# Patient Record
Sex: FEMALE | Race: WHITE | NOT HISPANIC OR LATINO | Employment: UNEMPLOYED | URBAN - NONMETROPOLITAN AREA
[De-identification: names, ages, dates, MRNs, and addresses within clinical notes are randomized per-mention and may not be internally consistent; named-entity substitution may affect disease eponyms.]

---

## 2017-05-04 ENCOUNTER — OFFICE VISIT (OUTPATIENT)
Dept: OBGYN | Facility: OTHER | Age: 37
End: 2017-05-04
Attending: NURSE PRACTITIONER
Payer: COMMERCIAL

## 2017-05-04 VITALS
HEIGHT: 64 IN | DIASTOLIC BLOOD PRESSURE: 72 MMHG | SYSTOLIC BLOOD PRESSURE: 112 MMHG | RESPIRATION RATE: 16 BRPM | OXYGEN SATURATION: 97 % | BODY MASS INDEX: 23.22 KG/M2 | WEIGHT: 136 LBS | HEART RATE: 92 BPM

## 2017-05-04 DIAGNOSIS — Z11.3 SCREEN FOR STD (SEXUALLY TRANSMITTED DISEASE): Primary | ICD-10-CM

## 2017-05-04 DIAGNOSIS — Z12.4 SCREENING FOR MALIGNANT NEOPLASM OF CERVIX: ICD-10-CM

## 2017-05-04 DIAGNOSIS — Z98.890 H/O NONCHEMICAL TUBAL OCCLUSION: ICD-10-CM

## 2017-05-04 PROCEDURE — 99212 OFFICE O/P EST SF 10 MIN: CPT

## 2017-05-04 PROCEDURE — G0123 SCREEN CERV/VAG THIN LAYER: HCPCS | Mod: ZL | Performed by: NURSE PRACTITIONER

## 2017-05-04 PROCEDURE — 99213 OFFICE O/P EST LOW 20 MIN: CPT | Performed by: NURSE PRACTITIONER

## 2017-05-04 PROCEDURE — 87491 CHLMYD TRACH DNA AMP PROBE: CPT | Mod: ZL | Performed by: NURSE PRACTITIONER

## 2017-05-04 PROCEDURE — 99000 SPECIMEN HANDLING OFFICE-LAB: CPT | Mod: ZL | Performed by: NURSE PRACTITIONER

## 2017-05-04 PROCEDURE — 88142 CYTOPATH C/V THIN LAYER: CPT | Performed by: NURSE PRACTITIONER

## 2017-05-04 PROCEDURE — 87624 HPV HI-RISK TYP POOLED RSLT: CPT | Mod: ZL | Performed by: NURSE PRACTITIONER

## 2017-05-04 PROCEDURE — G0476 HPV COMBO ASSAY CA SCREEN: HCPCS | Mod: ZL | Performed by: NURSE PRACTITIONER

## 2017-05-04 PROCEDURE — 87591 N.GONORRHOEAE DNA AMP PROB: CPT | Mod: ZL | Performed by: NURSE PRACTITIONER

## 2017-05-04 ASSESSMENT — PAIN SCALES - GENERAL: PAINLEVEL: NO PAIN (0)

## 2017-05-04 NOTE — PROGRESS NOTES
CC:  Annual gyn exam and referral request  HPI:  Brissa Santana is a 36 year old female [id number]'s last menstrual period was 2017 (approximate).  Periods are regular. H/O tubal occlusion 2008.  She is requesting a referral for possible reversal of her tubal occlusion.  No other c/o.      Past GYN history:  No STD history  STI testing offered?  Accepted       Last PAP smear:  Normal  Mammograms up to date: not applicable      Past Medical History:   Diagnosis Date     Backache, unspecified 2007     Chemical dependency (H) 2016     FRANCIA (generalized anxiety disorder)      Heroin use 2013     Lumbago 2003     MDD (major depressive disorder), recurrent, severe, with psychosis (H)      Polypharmacy -h/o tramadol,ambien,narcotic depend. 2013     Tramadol dependency and abuse 2011       Past Surgical History:   Procedure Laterality Date     APPENDECTOMY       BACK SURGERY      lumbar diskectomy with fusion      SECTION      x2     D & C       OTHER SURGICAL HISTORY      Breast Reduction     TONSILLECTOMY       TUBAL LIGATION         Family History   Problem Relation Age of Onset     Breast Cancer Maternal Grandmother      CANCER Paternal Grandmother      Hepatitis Paternal Uncle      Hep C     Hepatitis Father      Hep C       Current Outpatient Prescriptions   Medication Sig Dispense Refill     eszopiclone (LUNESTA) 2 MG tablet Take 1 tablet (2 mg) by mouth nightly as needed for sleep 30 tablet 1     venlafaxine (EFFEXOR-XR) 150 MG 24 hr capsule Take 1 capsule (150 mg) by mouth daily 30 capsule 3     clindamycin (CLEOCIN) 100 MG vaginal suppository Place 1 suppository (100 mg) vaginally At Bedtime 3 suppository 0       Allergies: Latex    ROS:  C: NEGATIVE for fever, chills, change in weight  I: NEGATIVE for worrisome rashes, moles or lesions  : NEGATIVE for frequency, dysuria, hematuria, vaginal discharge, or irregular bleeding  E: NEGATIVE for temperature  "intolerance, skin/hair changes  P: NEGATIVE for changes in mood or affect    EXAM:  Blood pressure 112/72, pulse 92, resp. rate 16, height 5' 3.75\" (1.619 m), weight 136 lb (61.7 kg), last menstrual period 04/01/2017, SpO2 97 %, not currently breastfeeding.   BMI= Body mass index is 23.53 kg/(m^2).  General - pleasant female in no acute distress.  Breast - no nodularity, asymmetry or nipple discharge bilaterally.  Abdomen - soft, nontender, nondistended, no hepatosplenomegaly.  Pelvic - EG: normal adult female, BUS: within normal limits, Vagina: well rugated, no discharge, Cervix: no lesions or CMT, Uterus: firm, normal sized and nontender, Adnexae: no masses or tenderness.  Rectovaginal - deferred.  Musculoskeletal - no gross deformities.  Neurological - normal strength, sensation, and mental status.      ASSESSMENT/PLAN:  (Z11.3) Screen for STD (sexually transmitted disease)  (primary encounter diagnosis)  Comment:   Plan: Chlamydia trachomatis PCR, Neisseria         gonorrhoeae PCR            (Z12.4) Screening for malignant neoplasm of cervix  Comment:   Plan: A pap thin layer screen with  HPV - recommended        age 30 - 65 years (select HPV order below), HPV        High Risk Types DNA Cervical            (N97.1) H/O nonchemical tubal occlusion  Comment:   Plan: OB/GYN REFERRAL        Release of records completed.       Discussed exercise and healthy eating, including calcium intake.  She should return to the clinic in one year, or sooner if problems arise.    "

## 2017-05-04 NOTE — PATIENT INSTRUCTIONS
The Range of Pap Test Results  When your Pap test is sent to the lab, the lab studies your cell samples and reports any abnormal cell changes. Your health care provider can discuss these changes with you. In some cases, an abnormal Pap test is due to an infection. More serious cell changes range from dysplasia to cancer. Talk to your health care provider about your Pap test.    Normal results  Cervical cells, even normal ones, are always changing. As they mature, normal squamous cells move from deeper layers within the cervix. Over time, these cells flatten and cover the surface of the cervix. Within the cervical canal, the cells are different. These glandular cells are taller and not as flat as the cells on the surface of the cervix. When a Pap test sample shows healthy cells of both types, the results are negative. Keep having Pap tests as often as directed.  Abnormal results  A positive Pap test result means some cells in the sample showed abnormal changes. These results are grouped by the type of cell change and the location, or extent, of the changes. Depending on the results, you may need further testing.    Inflammation: Noncancerous changes are present. They may be due to normal cell repair. Or, they may be caused by an infection, such as HPV or yeast. Further testing may be needed. (Also called reactive cellular changes.)    Atypical squamous cells: Test results are unclear. Cells on the surface of the cervix show changes, but their significance is not yet known. Testing for HPV and other sexually transmitted infections (STIs) may be needed. Treatment may be required. (Reported as ASC-US or ASC-H.)    Atypical glandular cells: Cells lining the cervical canal show abnormal changes. Further testing is likely. You may also have treatment to destroy or remove problem cells. (Reported as AGC.)    Mild dysplasia: Cells show distinct changes. More testing or HPV typing may be done. You may also have treatment to  destroy or remove problem cells. (Reported as low-grade BRANDY or DONNY 1.)    Moderate to severe dysplasia: Cells show precancerous changes. Or, noninvasive cancer (carcinoma in situ) may be present. Treatment to destroy or remove problem cells is likely. (Reported as high-grade BRANDY or DONNY 2 or DONNY 3.)    Cancer: Different types of cancer may be detected by your Pap test. More tests to assess the cancer's extent are likely. The type of treatment will depend on the test results and other factors, such as age and health history. (Reported as squamous cell carcinoma, endocervical adenocarcinoma in situ, or adenocarcinoma.)    1415-4818 [employer]. [address] [place] [address], [place] [address]. All rights reserved. This information is not intended as a substitute for professional medical care. Always follow your healthcare professional's instructions.

## 2017-05-04 NOTE — MR AVS SNAPSHOT
After Visit Summary   5/4/2017    Brissa Santana    MRN: [id number]           Patient Information     Date Of Birth          1980        Visit Information        Provider Department      5/4/2017 9:30 AM Starr Ochoa NP [facility]        Today's Diagnoses     Screen for STD (sexually transmitted disease)    -  1    Screening for malignant neoplasm of cervix        H/O nonchemical tubal occlusion          Care Instructions      The Range of Pap Test Results  When your Pap test is sent to the lab, the lab studies your cell samples and reports any abnormal cell changes. Your health care provider can discuss these changes with you. In some cases, an abnormal Pap test is due to an infection. More serious cell changes range from dysplasia to cancer. Talk to your health care provider about your Pap test.    Normal results  Cervical cells, even normal ones, are always changing. As they mature, normal squamous cells move from deeper layers within the cervix. Over time, these cells flatten and cover the surface of the cervix. Within the cervical canal, the cells are different. These glandular cells are taller and not as flat as the cells on the surface of the cervix. When a Pap test sample shows healthy cells of both types, the results are negative. Keep having Pap tests as often as directed.  Abnormal results  A positive Pap test result means some cells in the sample showed abnormal changes. These results are grouped by the type of cell change and the location, or extent, of the changes. Depending on the results, you may need further testing.    Inflammation: Noncancerous changes are present. They may be due to normal cell repair. Or, they may be caused by an infection, such as HPV or yeast. Further testing may be needed. (Also called reactive cellular changes.)    Atypical squamous cells: Test results are unclear. Cells on the surface of the cervix show changes, but their significance is  not yet known. Testing for HPV and other sexually transmitted infections (STIs) may be needed. Treatment may be required. (Reported as ASC-US or ASC-H.)    Atypical glandular cells: Cells lining the cervical canal show abnormal changes. Further testing is likely. You may also have treatment to destroy or remove problem cells. (Reported as AGC.)    Mild dysplasia: Cells show distinct changes. More testing or HPV typing may be done. You may also have treatment to destroy or remove problem cells. (Reported as low-grade BRANDY or DONNY 1.)    Moderate to severe dysplasia: Cells show precancerous changes. Or, noninvasive cancer (carcinoma in situ) may be present. Treatment to destroy or remove problem cells is likely. (Reported as high-grade BRANDY or DONNY 2 or DONNY 3.)    Cancer: Different types of cancer may be detected by your Pap test. More tests to assess the cancer's extent are likely. The type of treatment will depend on the test results and other factors, such as age and health history. (Reported as squamous cell carcinoma, endocervical adenocarcinoma in situ, or adenocarcinoma.)    9149-5589 [employer]. [address] [place] [address]. All rights reserved. This information is not intended as a substitute for professional medical care. Always follow your healthcare professional's instructions.              Follow-ups after your visit        Additional Services     OB/GYN REFERRAL       Your provider has referred you to:  [facility] reproductive medicine     Please be aware that coverage of these services is subject to the terms and limitations of your health insurance plan.  Call member services at your health plan with any benefit or coverage questions.      Please bring the following to your appointment:  >>   Any x-rays, CTs or MRIs which have been performed.  Contact the facility where they were done to arrange for  prior to your scheduled appointment.  Any new CT, MRI or other  "procedures ordered by your specialist must be performed at a [facility] facility or coordinated by your clinic's referral office.    >>   List of current medications   >>   This referral request   >>   Any documents/labs given to you for this referral                  Follow-up notes from your care team     Return in about 1 year (around 5/4/2018).      Who to contact     If you have questions or need follow up information about today's clinic visit or your schedule please contact [facility] directly at [phone].  Normal or non-critical lab and imaging results will be communicated to you by [employer], letter or phone within 4 business days after the clinic has received the results. If you do not hear from us within 7 days, please contact the clinic through [employer] or phone. If you have a critical or abnormal lab result, we will notify you by phone as soon as possible.  Submit refill requests through [employer] or call your pharmacy and they will forward the refill request to us. Please allow 3 business days for your refill to be completed.          Additional Information About Your Visit        [employer] Information     [employer] gives you secure access to your electronic health record. If you see a primary care provider, you can also send messages to your care team and make appointments. If you have questions, please call your primary care clinic.  If you do not have a primary care provider, please call [phone] and they will assist you.        Care EveryWhere ID     This is your Care EveryWhere ID. This could be used by other organizations to access your [facility] medical records  [id number]        Your Vitals Were     Pulse Respirations Height Last Period Pulse Oximetry Breastfeeding?    92 16 5' 3.75\" (1.619 m) 04/01/2017 (Approximate) 97% No    BMI (Body Mass Index)                   23.53 kg/m2            Blood Pressure from Last 3 Encounters:   05/04/17 112/72   12/13/16 118/70   08/09/16 118/70 "    Weight from Last 3 Encounters:   05/04/17 136 lb (61.7 kg)   12/13/16 146 lb (66.2 kg)   08/09/16 146 lb (66.2 kg)              We Performed the Following     A pap thin layer screen with  HPV - recommended age 30 - 65 years (select HPV order below)     Chlamydia trachomatis PCR     HPV High Risk Types DNA Cervical     Neisseria gonorrhoeae PCR     OB/GYN REFERRAL          Today's Medication Changes          These changes are accurate as of: 5/4/17  1:49 PM.  If you have any questions, ask your nurse or doctor.               These medicines have changed or have updated prescriptions.        Dose/Directions    venlafaxine 150 MG 24 hr capsule   Commonly known as:  EFFEXOR-XR   This may have changed:  Another medication with the same name was removed. Continue taking this medication, and follow the directions you see here.   Used for:  MDD (major depressive disorder), recurrent, severe, with psychosis (H), FRANCIA (generalized anxiety disorder)   Changed by:  Abhilash Philip MD        Dose:  150 mg   Take 1 capsule (150 mg) by mouth daily   Quantity:  30 capsule   Refills:  3                Primary Care Provider Office Phone # Fax #    Abhilash Philip [phone]       [address] [address]        Thank you!     Thank you for choosing [facility]  for your care. Our goal is always to provide you with excellent care. Hearing back from our patients is one way we can continue to improve our services. Please take a few minutes to complete the written survey that you may receive in the mail after your visit with us. Thank you!             Your Updated Medication List - Protect others around you: Learn how to safely use, store and throw away your medicines at www.disposemymeds.org.          This list is accurate as of: 5/4/17  1:49 PM.  Always use your most recent med list.                   Brand Name Dispense Instructions for use    clindamycin 100 MG  vaginal suppository    CLEOCIN    3 suppository    Place 1 suppository (100 mg) vaginally At Bedtime       eszopiclone 2 MG tablet    LUNESTA    30 tablet    Take 1 tablet (2 mg) by mouth nightly as needed for sleep       venlafaxine 150 MG 24 hr capsule    EFFEXOR-XR    30 capsule    Take 1 capsule (150 mg) by mouth daily

## 2017-05-04 NOTE — NURSING NOTE
"Chief Complaint   Patient presents with     Referral     Would like a referral for a tubal reversal. Would like to discuss risks to have this done and what type of tubal she had in the past. Patient unsure where she could go for this.        Initial /72  Pulse 92  Resp 16  Ht 5' 3.75\" (1.619 m)  Wt 136 lb (61.7 kg)  LMP 04/01/2017 (Approximate)  SpO2 97%  Breastfeeding? No  BMI 23.53 kg/m2 Estimated body mass index is 23.53 kg/(m^2) as calculated from the following:    Height as of this encounter: 5' 3.75\" (1.619 m).    Weight as of this encounter: 136 lb (61.7 kg).  Medication Reconciliation: complete   Miracle Caruso      "

## 2017-05-05 LAB
C TRACH DNA SPEC QL NAA+PROBE: NORMAL
N GONORRHOEA DNA SPEC QL NAA+PROBE: NORMAL
SPECIMEN SOURCE: NORMAL
SPECIMEN SOURCE: NORMAL

## 2017-05-11 LAB
COPATH REPORT: ABNORMAL
PAP: ABNORMAL

## 2017-05-12 LAB
FINAL DIAGNOSIS: ABNORMAL
HPV HR 12 DNA CVX QL NAA+PROBE: POSITIVE
HPV16 DNA SPEC QL NAA+PROBE: NEGATIVE
HPV18 DNA SPEC QL NAA+PROBE: NEGATIVE
SPECIMEN DESCRIPTION: ABNORMAL

## 2017-06-22 ENCOUNTER — OFFICE VISIT (OUTPATIENT)
Dept: OBGYN | Facility: OTHER | Age: 37
End: 2017-06-22
Attending: OBSTETRICS & GYNECOLOGY
Payer: COMMERCIAL

## 2017-06-22 VITALS
HEART RATE: 72 BPM | BODY MASS INDEX: 23.92 KG/M2 | DIASTOLIC BLOOD PRESSURE: 60 MMHG | HEIGHT: 63 IN | WEIGHT: 135 LBS | SYSTOLIC BLOOD PRESSURE: 112 MMHG

## 2017-06-22 DIAGNOSIS — N76.0 BV (BACTERIAL VAGINOSIS): Primary | ICD-10-CM

## 2017-06-22 DIAGNOSIS — R87.612 PAPANICOLAOU SMEAR OF CERVIX WITH LOW GRADE SQUAMOUS INTRAEPITHELIAL LESION (LGSIL): ICD-10-CM

## 2017-06-22 DIAGNOSIS — B96.89 BV (BACTERIAL VAGINOSIS): Primary | ICD-10-CM

## 2017-06-22 PROCEDURE — 99213 OFFICE O/P EST LOW 20 MIN: CPT | Performed by: OBSTETRICS & GYNECOLOGY

## 2017-06-22 PROCEDURE — 99213 OFFICE O/P EST LOW 20 MIN: CPT

## 2017-06-22 RX ORDER — METRONIDAZOLE 500 MG/1
500 TABLET ORAL 2 TIMES DAILY
Qty: 14 TABLET | Refills: 10 | Status: SHIPPED | OUTPATIENT
Start: 2017-06-22 | End: 2018-01-03

## 2017-06-22 NOTE — NURSING NOTE
"Chief Complaint   Patient presents with     Colposcopy       Initial /60  Pulse 72  Ht 5' 3\" (1.6 m)  Wt 135 lb (61.2 kg)  BMI 23.91 kg/m2 Estimated body mass index is 23.91 kg/(m^2) as calculated from the following:    Height as of this encounter: 5' 3\" (1.6 m).    Weight as of this encounter: 135 lb (61.2 kg).  Medication Reconciliation: complete     TALON COON      "

## 2017-06-22 NOTE — MR AVS SNAPSHOT
After Visit Summary   6/22/2017    Brissa Santana    MRN: [id number]           Patient Information     Date Of Birth          1980        Visit Information        Provider Department      6/22/2017 3:00 PM Antelmo Jeong MD [facility]        Today's Diagnoses     BV (bacterial vaginosis)    -  1    Papanicolaou smear of cervix with low grade squamous intraepithelial lesion (LGSIL)          Care Instructions    F/u appt  scheduled          Follow-ups after your visit        Your next 10 appointments already scheduled     Jul 20, 2017  2:00 PM CDT   (Arrive by 1:45 PM)   Colposcopy with Antelmo Jeong MD   [facility] )    [address]  [facility] [address]   [phone]              Who to contact     If you have questions or need follow up information about today's clinic visit or your schedule please contact [facility] directly at [phone].  Normal or non-critical lab and imaging results will be communicated to you by [employer]hart, letter or phone within 4 business days after the clinic has received the results. If you do not hear from us within 7 days, please contact the clinic through [employer]hart or phone. If you have a critical or abnormal lab result, we will notify you by phone as soon as possible.  Submit refill requests through [employer] or call your pharmacy and they will forward the refill request to us. Please allow 3 business days for your refill to be completed.          Additional Information About Your Visit        MyChart Information     [employer] gives you secure access to your electronic health record. If you see a primary care provider, you can also send messages to your care team and make appointments. If you have questions, please call your primary care clinic.  If you do not have a primary care provider, please call [phone] and they will assist you.        Care EveryWhere ID     This is your Care  "EveryWhere ID. This could be used by other organizations to access your [facility] medical records  [id number]        Your Vitals Were     Pulse Height BMI (Body Mass Index)             72 5' 3\" (1.6 m) 23.91 kg/m2          Blood Pressure from Last 3 Encounters:   06/22/17 112/60   05/04/17 112/72   12/13/16 118/70    Weight from Last 3 Encounters:   06/22/17 135 lb (61.2 kg)   05/04/17 136 lb (61.7 kg)   12/13/16 146 lb (66.2 kg)              Today, you had the following     No orders found for display         Today's Medication Changes          These changes are accurate as of: 6/22/17  5:24 PM.  If you have any questions, ask your nurse or doctor.               Start taking these medicines.        Dose/Directions    metroNIDAZOLE 500 MG tablet   Commonly known as:  FLAGYL   Used for:  BV (bacterial vaginosis)   Started by:  Antelmo Jeong MD        Dose:  500 mg   Take 1 tablet (500 mg) by mouth 2 times daily for 7 days   Quantity:  14 tablet   Refills:  10            Where to get your medicines      [facility] - [place], [place] - [address] [facility] [address]  [address], [facility] [address]     Phone:  [phone]     metroNIDAZOLE 500 MG tablet                Primary Care Provider Office Phone # Fax #    Abhilash STEPHENIE Philip [phone]       [facility] [address]  [place] [address]        Equal Access to Services     [facility] AH: Hadii aad ku hadasho Soomaali, waaxda luqadaha, qaybta kaalmada adeegyada, waxay melaniin hayrdn helga briceño'jailyn castaneda. So [facility] [phone].    ATENCIÓN: Si habla español, tiene a anderson disposición servicios gratuitos de asistencia lingüística. Llame al [phone].    We comply with applicable federal civil rights laws and [place] laws. We do not discriminate on the basis of race, color, national origin, age, disability sex, sexual orientation or gender identity.            Thank you!     " Thank you for choosing [facility]  for your care. Our goal is always to provide you with excellent care. Hearing back from our patients is one way we can continue to improve our services. Please take a few minutes to complete the written survey that you may receive in the mail after your visit with us. Thank you!             Your Updated Medication List - Protect others around you: Learn how to safely use, store and throw away your medicines at www.disposemymeds.org.          This list is accurate as of: 6/22/17  5:24 PM.  Always use your most recent med list.                   Brand Name Dispense Instructions for use Diagnosis    metroNIDAZOLE 500 MG tablet    FLAGYL    14 tablet    Take 1 tablet (500 mg) by mouth 2 times daily for 7 days    BV (bacterial vaginosis)       venlafaxine 150 MG 24 hr capsule    EFFEXOR-XR    30 capsule    Take 1 capsule (150 mg) by mouth daily    MDD (major depressive disorder), recurrent, severe, with psychosis (H), FRANCIA (generalized anxiety disorder)

## 2017-06-22 NOTE — PROGRESS NOTES
S:  Recurrent BV and abnormal pap.  35 yo f with h/o recurrent BV with coitus documented on wet preps.   Happens with ejaculation only.   She currently has recurrent sx.  She also recently had anuual with pap which returned LSIL with + HR HPV(other)          Patient Active Problem List   Diagnosis     Tramadol dependency and abuse     Drug overdose     ACP (advance care planning)     MDD (major depressive disorder), recurrent, severe, with psychosis (H)     FRANCIA (generalized anxiety disorder)     Chemical dependency (H)     Neck pain     History of reduction mammoplasty     History of spinal surgery     Insomnia     Inflammation of sacroiliac joint (H)            Past Medical History:   Diagnosis Date     Backache, unspecified 2007     Chemical dependency (H) 2016     FRANCIA (generalized anxiety disorder)      Heroin use 2013     Lumbago 2003     MDD (major depressive disorder), recurrent, severe, with psychosis (H)      Polypharmacy -h/o tramadol,ambien,narcotic depend. 2013     Tramadol dependency and abuse 2011            Past Surgical History:   Procedure Laterality Date     APPENDECTOMY       BACK SURGERY      lumbar diskectomy with fusion      SECTION      x2     D & C       OTHER SURGICAL HISTORY      Breast Reduction     TONSILLECTOMY       TUBAL LIGATION              Social History   Substance Use Topics     Smoking status: Former Smoker     Packs/day: 1.00     Years: 15.00     Types: Cigarettes     Quit date: 3/11/2017     Smokeless tobacco: Never Used      Comment: Tried to quit (yes); longest period tobacco-free- 9 months     Alcohol use No            Family History   Problem Relation Age of Onset     Breast Cancer Maternal Grandmother      CANCER Paternal Grandmother      Hepatitis Paternal Uncle      Hep C     Hepatitis Father      Hep C               Allergies   Allergen Reactions     Latex      Irritation; see comments            Current Outpatient  "Prescriptions   Medication Sig Dispense Refill     metroNIDAZOLE (FLAGYL) 500 MG tablet Take 1 tablet (500 mg) by mouth 2 times daily for 7 days 14 tablet 10     venlafaxine (EFFEXOR-XR) 150 MG 24 hr capsule Take 1 capsule (150 mg) by mouth daily 30 capsule 3          Review Of Systems  Constitutional:  Denies fever  GI/ negative except as noted per hpi    O:   /60  Pulse 72  Ht 5' 3\" (1.6 m)  Wt 135 lb (61.2 kg)  BMI 23.91 kg/m2  Gen:  NAD, A and O         A: Recurrent BV, LSIL pap with + High risk HPV.    P:  Flagyl 500mg po BID for 7 days for current infection. Will defer colposcopy until after treatment.  For recurrence prevention discussed probiotics, withdrawal, condoms and prophylactic flagyl 1 pill with coitus.  Metronidazole refills authorized and I will see her back for f/u appt for Colpo.   Handouts on Abnormal pap smears, colposcopy reviewed with pt.    20  minutes were spent with the patient with greater than 50% of the visit spent in face-to-face counseling and coordination of care.    "

## 2017-07-02 DIAGNOSIS — F33.3 MDD (MAJOR DEPRESSIVE DISORDER), RECURRENT, SEVERE, WITH PSYCHOSIS (H): ICD-10-CM

## 2017-07-02 DIAGNOSIS — F41.1 GAD (GENERALIZED ANXIETY DISORDER): ICD-10-CM

## 2017-07-03 NOTE — TELEPHONE ENCOUNTER
Effexor    Last Written Prescription Date: 12/13/16  Last Fill Quantity: 30, # refills: 3  Last Office Visit with [facility], [facility] or [facility] prescribing provider: 12/13/16   Next 5 appointments (look out 90 days)     Jul 20, 2017  2:00 PM CDT   (Arrive by 1:45 PM)   Colposcopy with Antelmo Jeong MD   [facility] ([facility] )    [address]  [place] [address]   [phone]                   BP Readings from Last 3 Encounters:   06/22/17 112/60   05/04/17 112/72   12/13/16 118/70     Pulse: (for Fetzima)  Creatinine   Date Value Ref Range Status   02/20/2016 0.69 0.52 - 1.04 mg/dL Final   ]    Last PHQ-9 score on record=   PHQ-9 SCORE 12/13/2016   Total Score -   Total Score 5

## 2017-07-05 RX ORDER — VENLAFAXINE HYDROCHLORIDE 150 MG/1
CAPSULE, EXTENDED RELEASE ORAL
Qty: 30 CAPSULE | Refills: 0 | Status: SHIPPED | OUTPATIENT
Start: 2017-07-05 | End: 2017-08-10

## 2017-07-20 PROBLEM — Z53.9 NO SHOW: Status: ACTIVE | Noted: 2017-07-20

## 2017-08-10 DIAGNOSIS — F33.3 MDD (MAJOR DEPRESSIVE DISORDER), RECURRENT, SEVERE, WITH PSYCHOSIS (H): ICD-10-CM

## 2017-08-10 DIAGNOSIS — F41.1 GAD (GENERALIZED ANXIETY DISORDER): ICD-10-CM

## 2017-08-11 RX ORDER — VENLAFAXINE HYDROCHLORIDE 150 MG/1
CAPSULE, EXTENDED RELEASE ORAL
Qty: 30 CAPSULE | Refills: 3 | Status: SHIPPED | OUTPATIENT
Start: 2017-08-11 | End: 2018-02-27

## 2017-08-11 NOTE — TELEPHONE ENCOUNTER
Last PHQ-9 score on record=   PHQ-9 SCORE 12/13/2016   Total Score -   Total Score 5       Lab Results   Component Value Date    AST 6 02/20/2016     Lab Results   Component Value Date    ALT 11 02/20/2016

## 2017-10-03 ENCOUNTER — OFFICE VISIT (OUTPATIENT)
Dept: OBGYN | Facility: OTHER | Age: 37
End: 2017-10-03
Attending: OBSTETRICS & GYNECOLOGY
Payer: COMMERCIAL

## 2017-10-03 VITALS
WEIGHT: 140 LBS | HEART RATE: 84 BPM | DIASTOLIC BLOOD PRESSURE: 63 MMHG | HEIGHT: 63 IN | OXYGEN SATURATION: 96 % | BODY MASS INDEX: 24.8 KG/M2 | SYSTOLIC BLOOD PRESSURE: 112 MMHG

## 2017-10-03 DIAGNOSIS — R10.2 PELVIC PAIN IN FEMALE: ICD-10-CM

## 2017-10-03 DIAGNOSIS — R87.612 PAPANICOLAOU SMEAR OF CERVIX WITH LOW GRADE SQUAMOUS INTRAEPITHELIAL LESION (LGSIL): Primary | ICD-10-CM

## 2017-10-03 DIAGNOSIS — N92.1 METRORRHAGIA: ICD-10-CM

## 2017-10-03 PROCEDURE — 57454 BX/CURETT OF CERVIX W/SCOPE: CPT | Performed by: OBSTETRICS & GYNECOLOGY

## 2017-10-03 PROCEDURE — 88305 TISSUE EXAM BY PATHOLOGIST: CPT | Mod: TC | Performed by: OBSTETRICS & GYNECOLOGY

## 2017-10-03 PROCEDURE — 99213 OFFICE O/P EST LOW 20 MIN: CPT | Mod: 25 | Performed by: OBSTETRICS & GYNECOLOGY

## 2017-10-03 PROCEDURE — 99213 OFFICE O/P EST LOW 20 MIN: CPT

## 2017-10-03 ASSESSMENT — PAIN SCALES - GENERAL: PAINLEVEL: NO PAIN (0)

## 2017-10-03 NOTE — PROGRESS NOTES
Brissa Santana is a 37 year old female P2(CS)   who presents for initial colposcopy.  Pap smear 4 months ago showed: LGSIL and with high risk HPV present(other). The prior pap showed normal.   She complans of occ IMB with dyspareunia/pelvic pains.  Recent BV.   H/o recurrent BV.  No vaginitis sx or DC currently.  See my prior evals.     No LMP recorded.   UPT today is not done    PMHx/Fam hx/PSHX reviewed and unchanged from EMR.  Former smoker    ROS:  Neg GI/      Type of contraception: tubal ligation  Past GYN history:  No STD history, Bacterial vaginosis and HPV  Prior cervical/vaginal disease: Normal exam without visible pathology.  Prior cervical treatment: no treatment.  Tobacco no      PROCEDURE:  Before the procedure, it was ensured that the patient was educated regarding the nature of her findings to date, the implications, and what was to be done. She has been made aware of the role of HPV, the natural history of infection, ways to minimize her future risk, the effect of HPV on the cervix, and treatment options available should they be indicated.  The details of the colposcopic procedure were reviewed.  All questions were answered before proceeding, and informed consent was therefore obtained.    Speculum placed in vagina and excellent visualization of cervix   acheived, cervix swabbed x 3 with acetic acid and Lugol's solution. Colposcopy was satisfactory and the entire transformation zone seen.    FINDINGS:  Cervix: HPV effect at 6:00    Pap repeated?:  No  SCJ seen?:  yes    ECC done?:  Yes   Satisfactory examination?:  yes      ASSESSMENT: Normal exam without visible pathology and HPV related changes.    PLAN: specimens labelled and sent to Pathology, will base further treatment on Pathology findings, treatment options discussed with patient and call to discuss Pathology results.  If nml ECC repeat Pap and HPV  next year.    Pelvic US ordered to eval for pelvic or uterine pathology given  intermittent pelvicpain/IMB.

## 2017-10-03 NOTE — MR AVS SNAPSHOT
After Visit Summary   10/3/2017    Brissa Santana    MRN: [id number]           Patient Information     Date Of Birth          1980        Visit Information        Provider Department      10/3/2017 11:00 AM Antelmo Jeong MD [facility]        Today's Diagnoses     Papanicolaou smear of cervix with low grade squamous intraepithelial lesion (LGSIL)    -  1    Metrorrhagia        Pelvic pain in female          Care Instructions     Pt has my card and phone number to call as needed if problems in the interim or she does not here her results.           Follow-ups after your visit        Who to contact     If you have questions or need follow up information about today's clinic visit or your schedule please contact [facility] directly at [phone].  Normal or non-critical lab and imaging results will be communicated to you by MyChart, letter or phone within 4 business days after the clinic has received the results. If you do not hear from us within 7 days, please contact the clinic through [employer]am[employer]art or phone. If you have a critical or abnormal lab result, we will notify you by phone as soon as possible.  Submit refill requests through [employer] or call your pharmacy and they will forward the refill request to us. Please allow 3 business days for your refill to be completed.          Additional Information About Your Visit        Famigohart Information     [employer] gives you secure access to your electronic health record. If you see a primary care provider, you can also send messages to your care team and make appointments. If you have questions, please call your primary care clinic.  If you do not have a primary care provider, please call [phone] and they will assist you.        Care EveryWhere ID     This is your Care EveryWhere ID. This could be used by other organizations to access your [facility] medical records  [id number]        Your Vitals Were     Pulse Height Pulse  "Oximetry BMI (Body Mass Index)          84 5' 3\" (1.6 m) 96% 24.8 kg/m2         Blood Pressure from Last 3 Encounters:   10/03/17 112/63   06/22/17 112/60   05/04/17 112/72    Weight from Last 3 Encounters:   10/03/17 140 lb (63.5 kg)   06/22/17 135 lb (61.2 kg)   05/04/17 136 lb (61.7 kg)              We Performed the Following     Colposcopy cervix with cervix biopsy and ECC     Surgical pathology exam        Primary Care Provider Office Phone # Fax #    Abhilash Philip [phone]       [facility] [address]  [place] [address]        Equal Access to Services     LESVIA ESCALERA : Hadii gracie sahni hadasho Soomaali, waaxda luqadaha, qaybta kaalmada adeegyada, waxwilliam poloin wilfrid ramirez . So [facility] [phone].    ATENCIÓN: Si habla español, tiene a anderson disposición servicios gratuitos de asistencia lingüística. Evetteame al [phone].    We comply with applicable federal civil rights laws and [place] laws. We do not discriminate on the basis of race, color, national origin, age, disability, sex, sexual orientation, or gender identity.            Thank you!     Thank you for choosing [facility]  for your care. Our goal is always to provide you with excellent care. Hearing back from our patients is one way we can continue to improve our services. Please take a few minutes to complete the written survey that you may receive in the mail after your visit with us. Thank you!             Your Updated Medication List - Protect others around you: Learn how to safely use, store and throw away your medicines at www.disposemymeds.org.          This list is accurate as of: 10/3/17 12:20 PM.  Always use your most recent med list.                   Brand Name Dispense Instructions for use Diagnosis    venlafaxine 150 MG 24 hr capsule    EFFEXOR-XR    30 capsule    TAKE 1 CAPSULE(150 MG) BY MOUTH DAILY    MDD (major depressive disorder), recurrent, severe, with psychosis (H), " FRANCIA (generalized anxiety disorder)

## 2017-10-03 NOTE — NURSING NOTE
"Chief Complaint   Patient presents with     Colposcopy       Initial /63 (BP Location: Right arm, Cuff Size: Adult Regular)  Pulse 84  Ht 5' 3\" (1.6 m)  Wt 140 lb (63.5 kg)  SpO2 96%  BMI 24.8 kg/m2 Estimated body mass index is 24.8 kg/(m^2) as calculated from the following:    Height as of this encounter: 5' 3\" (1.6 m).    Weight as of this encounter: 140 lb (63.5 kg).  Medication Reconciliation: complete     Loida Echols      "

## 2017-10-05 LAB — COPATH REPORT: NORMAL

## 2018-01-03 DIAGNOSIS — B96.89 BV (BACTERIAL VAGINOSIS): ICD-10-CM

## 2018-01-03 DIAGNOSIS — N76.0 BV (BACTERIAL VAGINOSIS): ICD-10-CM

## 2018-01-04 RX ORDER — METRONIDAZOLE 500 MG/1
TABLET ORAL
Qty: 14 TABLET | Refills: 0 | Status: SHIPPED | OUTPATIENT
Start: 2018-01-04 | End: 2018-01-23

## 2018-02-27 ENCOUNTER — TELEPHONE (OUTPATIENT)
Dept: OBGYN | Facility: OTHER | Age: 38
End: 2018-02-27

## 2018-02-27 DIAGNOSIS — F41.1 GAD (GENERALIZED ANXIETY DISORDER): ICD-10-CM

## 2018-02-27 DIAGNOSIS — F33.3 MDD (MAJOR DEPRESSIVE DISORDER), RECURRENT, SEVERE, WITH PSYCHOSIS (H): ICD-10-CM

## 2018-02-27 NOTE — TELEPHONE ENCOUNTER
"Patient called and needs a pap appt with Dr Jeong. Says she \"needs some things checked out\". If you let me know where I can schedule her, I can call the patient back. If you need to reach the patient her number she gave was [phone].      "

## 2018-02-27 NOTE — LETTER
[facility] [place]  [address] [place] [address]                Brissa Santana  [address]  [facility] [address]      February 27, 2018       Dear Brissa Santana,    MISA REMINDER:       Our record indicates that it is time for you to be seen for an office visit with .  You may call our office at [phone] to schedule an appointment for an annual exam.Please disregard this notice if you have already made an appointment.      Sincerely,    MD Jennifer

## 2018-02-27 NOTE — TELEPHONE ENCOUNTER
Effexor last filled on 8.11.17 #30 with 3 refills.Last office visit and PHQ-9 on 12.13.16. Medication pended.Recall appointment letter sent.Thank you.

## 2018-02-28 RX ORDER — VENLAFAXINE HYDROCHLORIDE 150 MG/1
CAPSULE, EXTENDED RELEASE ORAL
Qty: 30 CAPSULE | Refills: 1 | Status: SHIPPED | OUTPATIENT
Start: 2018-02-28 | End: 2018-05-02

## 2018-03-05 ENCOUNTER — OFFICE VISIT (OUTPATIENT)
Dept: FAMILY MEDICINE | Facility: OTHER | Age: 38
End: 2018-03-05
Attending: FAMILY MEDICINE
Payer: COMMERCIAL

## 2018-03-05 VITALS
SYSTOLIC BLOOD PRESSURE: 122 MMHG | HEART RATE: 64 BPM | DIASTOLIC BLOOD PRESSURE: 68 MMHG | TEMPERATURE: 98.3 F | BODY MASS INDEX: 27.29 KG/M2 | HEIGHT: 63 IN | WEIGHT: 154 LBS

## 2018-03-05 DIAGNOSIS — D17.30 LIPOMA OF SKIN AND SUBCUTANEOUS TISSUE: ICD-10-CM

## 2018-03-05 DIAGNOSIS — F51.04 PSYCHOPHYSIOLOGICAL INSOMNIA: ICD-10-CM

## 2018-03-05 DIAGNOSIS — K64.9 HEMORRHOIDS, UNSPECIFIED HEMORRHOID TYPE: ICD-10-CM

## 2018-03-05 DIAGNOSIS — F33.3 MDD (MAJOR DEPRESSIVE DISORDER), RECURRENT, SEVERE, WITH PSYCHOSIS (H): Primary | ICD-10-CM

## 2018-03-05 DIAGNOSIS — F41.1 GAD (GENERALIZED ANXIETY DISORDER): ICD-10-CM

## 2018-03-05 PROCEDURE — 99214 OFFICE O/P EST MOD 30 MIN: CPT | Performed by: FAMILY MEDICINE

## 2018-03-05 PROCEDURE — G0463 HOSPITAL OUTPT CLINIC VISIT: HCPCS

## 2018-03-05 RX ORDER — GABAPENTIN 300 MG/1
CAPSULE ORAL
Qty: 90 CAPSULE | Refills: 1 | Status: SHIPPED | OUTPATIENT
Start: 2018-03-05 | End: 2018-04-04

## 2018-03-05 ASSESSMENT — ANXIETY QUESTIONNAIRES
3. WORRYING TOO MUCH ABOUT DIFFERENT THINGS: SEVERAL DAYS
1. FEELING NERVOUS, ANXIOUS, OR ON EDGE: SEVERAL DAYS
5. BEING SO RESTLESS THAT IT IS HARD TO SIT STILL: NOT AT ALL
6. BECOMING EASILY ANNOYED OR IRRITABLE: SEVERAL DAYS
7. FEELING AFRAID AS IF SOMETHING AWFUL MIGHT HAPPEN: SEVERAL DAYS
2. NOT BEING ABLE TO STOP OR CONTROL WORRYING: SEVERAL DAYS
GAD7 TOTAL SCORE: 6
4. TROUBLE RELAXING: SEVERAL DAYS
IF YOU CHECKED OFF ANY PROBLEMS ON THIS QUESTIONNAIRE, HOW DIFFICULT HAVE THESE PROBLEMS MADE IT FOR YOU TO DO YOUR WORK, TAKE CARE OF THINGS AT HOME, OR GET ALONG WITH OTHER PEOPLE: VERY DIFFICULT

## 2018-03-05 ASSESSMENT — PAIN SCALES - GENERAL: PAINLEVEL: NO PAIN (0)

## 2018-03-05 NOTE — PATIENT INSTRUCTIONS
Depression Affects Your Mind and Body  Everyone feels sad or  blue  from time to time for a few days or weeks. Depression is when these feelings don't go away and they interfere with daily life.  Depression is a real illness that can develop at any age. It is one of the most common mental health problems in the [place] Depression makes you feel sad, helpless, and hopeless. It gets in the way of your life and relationships. It inhibits your ability to think and act. But, with help, you can feel better again.      When I was depressed, I felt awful. I was so tired all the time I could hardly think, but at night I couldn t fall asleep. My head hurt. My stomach hurt. I didn t know what was wrong with me.    Depression affects your whole body  Brain chemicals affect your body as well as your mood. So depression may do more than just make you feel low. You may also feel bad physically. Depression can:    Cause trouble with mental tasks such as remembering, concentrating, or making decisions    Make you feel nervous and jumpy    Cause trouble sleeping. Or you may sleep too much    Change your appetite    Cause headaches, stomachaches, or other aches and pains    Drain your body of energy  Depression and other illness  It is common for people who have chronic health problems to also have depression. It can often be hard to tell which one caused the other. A person might become depressed after finding out they have a health problem. But some studies suggest being depressed may make certain health problems more likely. And some depressed people stop taking care of themselves. This may make them more likely to get sick.  Date Last Reviewed: 1/1/2017 2000-2017 [employer]. [address] [place] [address], [place] [address]. All rights reserved. This information is not intended as a substitute for professional medical care. Always follow your healthcare professional's instructions.        Understanding Anxiety Disorders   Almost everyone gets nervous now and then. It s normal to have knots in your stomach before a test, or for your heart to race on a first date. But an anxiety disorder is much more than a case of nerves. In fact, its symptoms may be overwhelming. But treatment can relieve many of these symptoms. Talking to your healthcare provider is the first step.    What are anxiety disorders?  An anxiety disorder causes intense feelings of panic and fear. These feelings may arise for no apparent reason. And they tend to recur again and again. They may prevent you from coping with life and cause you great distress. As a result, you may avoid anything that triggers your fear. In extreme cases, you may never leave the house. Anxiety disorders may cause other symptoms, such as:    Obsessive thoughts you can t control    Constant nightmares or painful thoughts of the past    Nausea, sweating, and muscle tension    Trouble sleeping or concentrating  What causes anxiety disorders?  Anxiety disorders tend to run in families. For some people, childhood abuse or neglect may play a role. For others, stressful life events or trauma may trigger anxiety disorders. Anxiety can trigger low self-esteem and poor coping skills.  Common anxiety disorders    Panic disorder. This causes an intense fear of being in danger.    Phobias. These are extreme fears of certain objects, places, or events.    Obsessive-compulsive disorder. This causes you to have unwanted thoughts and urges. You also may perform certain actions over and over.    Posttraumatic stress disorder. This occurs in people who have survived a terrible ordeal. It can cause nightmares and flashbacks about the event.    Generalized anxiety disorder. This causes constant worry that can greatly disrupt your life.   Getting better  You may believe that nothing can help you. Or, you might fear what others may think. But most anxiety symptoms can be eased. Having an anxiety disorder is nothing  to be ashamed of. Most people do best with treatment that combines medicine and therapy. These aren t cures. But they can help you live a healthier life.  Date Last Reviewed: 2/1/2017 2000-2017 [employer]. [address] [place] [address], [place] [address]. All rights reserved. This information is not intended as a substitute for professional medical care. Always follow your healthcare professional's instructions.        Treating Hemorrhoids: Removal  If your symptoms persist, your healthcare provider may recommend removing the hemorrhoid. This can be done in your healthcare provider's office or at a surgical center. In most cases, no special preparation is needed. Keep in mind that your treatment may differ depending on your symptoms and the location of the hemorrhoid.           Internal hemorrhoids  You ll be asked to lie or kneel on a table. Your healthcare provider then inserts an anoscope to view the anal canal. To treat the hemorrhoid, your healthcare provider will use one of the methods listed below. Because internal hemorrhoids do not have nerves that sense pain, you won t have too much discomfort. You can often return to your normal routine the same day. If you have many hemorrhoids, you may need repeated treatments.  Banding  The banding method is done by placing tight elastic bands around the base of the hemorrhoid. This cuts off blood supply to the hemorrhoid, causing it to fall off. This usually takes about a week. The area then heals within a few days.  Infrared coagulation  This procedure is done using a small probe that exposes the hemorrhoid to short bursts of infrared light. This seals off the blood vessel, causing it to shrink. Slight bleeding may happen for a few days. The area usually heals within a week or two.  Sclerotherapy  Sclerotherapy is done by injecting a chemical into the tissue around the hemorrhoid. The chemical causes the hemorrhoid to shrink within a few days. Bleeding  usually stops in about 24 hours.  Thrombosed external hemorrhoids  Thrombosed external hemorrhoids are often very painful. That s because the swollen hemorrhoid stretches the sensitive skin around it. To relieve the pain, your healthcare provider may remove the blood clot. This takes just a few minutes. You may need to rest for a few days before returning to work.    Numbing the hemorrhoid. You ll be asked to lie or kneel on a table. The hemorrhoid is then injected with a local anesthetic. This may cause some discomfort for a moment. But within a short time your healthcare provider will be able to remove the hemorrhoid without causing pain.    Removing the hemorrhoid. A small incision is made to remove the blood clot. The hemorrhoid may also be removed. The skin is then either closed with sutures or left open to heal on its own. The area around the incision will likely be sore for a few days. But your pain should improve soon after the procedure.     Risks and complications  The possible risks and complications include:    Infection    Bleeding    Trouble urinating (Doesn't happen with thrombosed external hemorrhoids)    Narrowing of the anal canal (very rare, doesn't happen with thrombosed external hemorrhoids)  When to call your healthcare provider  After your procedure, call your healthcare provider if you have:    Increasing pain    Fever or chills    Persistent bleeding    Trouble urinating   Date Last Reviewed: 7/1/2016 2000-2017 [employer]. [address] [place], [place] [id number]. All rights reserved. This information is not intended as a substitute for professional medical care. Always follow your healthcare professional's instructions.        Treating Hemorrhoids: Self-Care    Follow your healthcare provider s advice about caring for your hemorrhoids at home. Some treatments help relieve symptoms right away. Others involve making changes in your diet and exercise habits. These can help  ease constipation and prevent hemorrhoid symptoms from coming back.  Relieving symptoms  Your healthcare provider may prescribe anti-inflammatory medicine to help ease your symptoms. The following tips will also help relieve pain and swelling.    Take sitz baths. Taking a sitz bath means sitting in a few inches of warm bath water. Soaking for 10 minutes twice a day can provide welcome relief from painful hemorrhoids. It can also help the area stay clean.    Develop good bowel habits. Use the bathroom when you need to. Don t ignore the urge to move your bowels. This can lead to constipation, hard stools, and straining. Also, don t read while on the toilet. Sit only as long as needed. Wipe gently with soft, unscented toilet tissue or baby wipes.    Use ice packs. Placing an ice pack on a thrombosed external hemorrhoid can help relieve pain right away. It will also help reduce the blood clot. Use the ice for 15 to 20 minutes at a time. Keep a cloth between the ice and your skin to prevent skin damage.    Use other measures. Laxatives and enemas can help ease constipation. But use them only on your healthcare provider s advice. For symptom relief, try using cotton pads soaked in witch hazel. These are available at most drugstores. Over-the-counter hemorrhoid ointments and petroleum jelly can also provide relief.  Add fiber to your diet  Adding fiber to your diet can help relieve constipation by making stools softer and easier to pass. To increase your fiber intake, your healthcare provider may recommend a bulking agent, such as psyllium. This is a high-fiber supplement available at most grocery stores and drugstores. Eating more fiber-rich foods will also help. There are two types of fiber:    Insoluble fiber is the main ingredient in bulking agents. It s also found in foods such as wheat bran, whole-grain breads, fresh fruits, and vegetables.    Soluble fiber is found in foods such as oat bran. Although soluble fiber  is good for you, it may not ease constipation as much as foods high in insoluble fiber.  Drink more water  Along with a high-fiber diet, drinking more water can help ease constipation. This is because insoluble fiber absorbs water, making stools soft and bulky. Be sure to drink plenty of water throughout the day. Drinking fruit juices, such as prune juice or apple juice, can also help prevent constipation.  Get more exercise  Regular exercise aids digestion and helps prevent constipation. It s also great for your health. So talk with your healthcare provider about starting an exercise program. Low-impact activities, such as swimming or walking, are good places to start. Take it easy at first. And remember to drink plenty of water when you exercise.  High-fiber foods  High-fiber foods offer many benefits. By making your stools softer, they help heal and prevent swollen hemorrhoids. They may also help reduce the risk of colon and rectal cancer. Best of all, they re usually low in calories and taste great. Here are some examples of fiber-rich foods.    Whole grains, such as wheat bran, corn bran, and brown rice.    Vegetables, especially carrots, broccoli, cabbage, and peas.    Fruits, such as apples, bananas, raisins, peaches, and pears.    Nuts and legumes, especially peanuts, lentils, and kidney beans.  Easy ways to add fiber  The tips below offer some simple ways to add more high-fiber foods to your meals.    Start your day with a high-fiber breakfast. Eat a wheat bran cereal along with a sliced banana. Or, try peanut butter on whole-wheat toast.    Eat carrot sticks for snacks. They re easy to prepare, taste great, and are low in calories.    Use whole-grain breads instead of white bread for sandwiches.    Eat fruits for treats. Try an apple and some raisins instead of a candy bar.   Date Last Reviewed: 7/1/2016 2000-2017 [employer]. [address] [place] [address], [place] [address]. All rights reserved.  This information is not intended as a substitute for professional medical care. Always follow your healthcare professional's instructions.        Treating Hemorrhoids: Surgery    Your healthcare provider may recommend surgery to remove hemorrhoids that cause severe symptoms. Your healthcare provider can explain the procedure that will be used. You ll also be told how to get ready for surgery, and what to expect while you recover.  Getting ready for surgery  Your surgery will be done at a hospital or outpatient surgical center. Be sure to follow all your healthcare provider s guidelines to prepare for surgery.    Tell your healthcare provider what medicines you take. This includes blood thinners, aspirin, and ibuprofen. Also mention if you take any herbal remedies or supplements. In some cases, you may need to stop taking them a week before surgery.    Stop smoking.    Arrange for an adult family member or friend to give you a ride home after the procedure.    Stop eating and drinking before midnight, the night before your surgery.        Risks and complications  The possible risks and complications of the treatments described on these pages include:    Infection    Bleeding    Trouble urinating    Narrowing of the anal canal (very rare)  When to call your healthcare provider  After surgery, call your healthcare provider immediately if you have any of the following:    Increasing pain    Persistent bleeding    Fever or chills    Inability to move your bowels    Trouble urinating   The day of surgery  Arrive at the hospital or surgery center on time. You will be asked to sign some forms and change into a patient gown. You ll then be given an IV (intravenous line), which supplies fluids and medicine. You may also be given a laxative or enema to clean stool from your rectum. Just before surgery, you ll talk with an anesthesiologist. He or she can explain the type of medicine used to prevent pain during surgery.    Local  anesthesia numbs just the surgical area.    Monitored sedation makes you relaxed and drowsy.    Regional anesthesia numbs certain areas of your body.    General anesthesia lets you sleep during the procedure.  During surgery  Your healthcare provider will insert an anoscope to view the anal canal. Using surgical tools, the swollen hemorrhoids are then removed. In some cases, the incision is closed with sutures. In other cases, you may have a procedure that closes the incision with staples.  Hemorrhoidectomy with sutures  The hemorrhoids are removed using surgical tools, such as a scalpel or cautery (sealing) device. The incision is then closed with sutures. In some cases, the incision may be left partially open. This allows fluid to drain and helps the healing process.  Stapled hemorrhoidopexy  This procedure uses a special device to remove a ring of tissue from the anal canal. Removing the tissue cuts off blood supply to the hemorrhoids, causing them to shrink. The tissue ring is then secured with staples. This helps hold the tissue in place.  After surgery  You ll be taken to a recovery area to rest for a while. You can usually go home the same day. But in some cases you may need to remain in the hospital overnight. For a short time after surgery you may have nausea, minor bleeding, and discharge. You ll also likely have some pain. To help you feel better, your healthcare provider will prescribe pain medicine. You may also be prescribed medicines to help make bowel movements easier.  Date Last Reviewed: 7/1/2016 2000-2017 [employer]. [address] [place] [address], [place] [address]. All rights reserved. This information is not intended as a substitute for professional medical care. Always follow your healthcare professional's instructions.

## 2018-03-05 NOTE — MR AVS SNAPSHOT
After Visit Summary   3/5/2018    Brissa Santana    MRN: [id number]           Patient Information     Date Of Birth          1980        Visit Information        Provider Department      3/5/2018 3:30 PM Abhilash Philip MD [place] [facility]        Today's Diagnoses     MDD (major depressive disorder), recurrent, severe, with psychosis (H)    -  1    FRANCIA (generalized anxiety disorder)        Psychophysiological insomnia        Lipoma of skin and subcutaneous tissue        Hemorrhoids, unspecified hemorrhoid type           Follow-ups after your visit        Additional Services     GENERAL SURG ADULT REFERRAL       Your provider has referred you to: [facility]   Please be aware that coverage of these services is subject to the terms and limitations of your health insurance plan.  Call member services at your health plan with any benefit or coverage questions.      Please bring the following with you to your appointment:    (1) Any X-Rays, CTs or MRIs which have been performed.  Contact the facility where they were done to arrange for  prior to your scheduled appointment.   (2) List of current medications   (3) This referral request   (4) Any documents/labs given to you for this referral                  Your next 10 appointments already scheduled     Mar 09, 2018  1:45 PM CST   (Arrive by 1:30 PM)   SHORT with Antelmo Jeong MD   [facility] [place] ([facility] [place] )    [address]  [place] [address] [phone]              Who to contact     If you have questions or need follow up information about today's clinic visit or your schedule please contact [facility] directly at [phone].  Normal or non-critical lab and imaging results will be communicated to you by MyChart, letter or phone within 4 business days after the clinic has received the results. If you do not hear from us within 7 days, please contact the clinic through [employer] or  "phone. If you have a critical or abnormal lab result, we will notify you by phone as soon as possible.  Submit refill requests through Moviestorm or call your pharmacy and they will forward the refill request to us. Please allow 3 business days for your refill to be completed.          Additional Information About Your Visit        Sefairahart Information     Moviestorm gives you secure access to your electronic health record. If you see a primary care provider, you can also send messages to your care team and make appointments. If you have questions, please call your primary care clinic.  If you do not have a primary care provider, please call [phone] and they will assist you.        Care EveryWhere ID     This is your Care EveryWhere ID. This could be used by other organizations to access your [place] medical records  [id number]        Your Vitals Were     Pulse Temperature Height BMI (Body Mass Index)          64 98.3  F (36.8  C) 5' 3\" (1.6 m) 27.28 kg/m2         Blood Pressure from Last 3 Encounters:   03/05/18 122/68   10/03/17 112/63   06/22/17 112/60    Weight from Last 3 Encounters:   03/05/18 154 lb (69.9 kg)   10/03/17 140 lb (63.5 kg)   06/22/17 135 lb (61.2 kg)              We Performed the Following     GENERAL SURG ADULT REFERRAL          Today's Medication Changes          These changes are accurate as of 3/5/18  4:30 PM.  If you have any questions, ask your nurse or doctor.               Start taking these medicines.        Dose/Directions    gabapentin 300 MG capsule   Commonly known as:  NEURONTIN   Used for:  Psychophysiological insomnia, FRANCIA (generalized anxiety disorder), MDD (major depressive disorder), recurrent, severe, with psychosis (H)   Started by:  Abhilash Philip MD        Take 1 tablet (300 mg) every night for 4-7 days, then 1 tablet twice daily for 4-7 days, then 1 tablet three times daily   Quantity:  90 capsule   Refills:  1         Stop taking these medicines if you haven't " already. Please contact your care team if you have questions.     metroNIDAZOLE 500 MG tablet   Commonly known as:  FLAGYL   Stopped by:  Abhilash Philip MD                Where to get your medicines      These medications were sent to [id number] - [place], [place] - [address] AT [facility] [address], [facility] [address]     Phone:  [phone]     gabapentin 300 MG capsule                Primary Care Provider Office Phone # Fax #    Abhilash Philip -[phone] [address] [facility] [address]        Equal Access to Services     [facility]: Hadii aad ku hadasho Soomaali, waaxda luqadaha, qaybta kaalmada adeegyada, waxay melaniin hayrdn adeiraida ramirez . So [facility] [phone].    ATENCIÓN: Si habla español, tiene a anderson disposición servicios gratuitos de asistencia lingüística. [facility] [phone].    We comply with applicable federal civil rights laws and [place] laws. We do not discriminate on the basis of race, color, national origin, age, disability, sex, sexual orientation, or gender identity.            Thank you!     Thank you for choosing [facility]  for your care. Our goal is always to provide you with excellent care. Hearing back from our patients is one way we can continue to improve our services. Please take a few minutes to complete the written survey that you may receive in the mail after your visit with us. Thank you!             Your Updated Medication List - Protect others around you: Learn how to safely use, store and throw away your medicines at www.disposemymeds.org.          This list is accurate as of 3/5/18  4:30 PM.  Always use your most recent med list.                   Brand Name Dispense Instructions for use Diagnosis    gabapentin 300 MG capsule    NEURONTIN    90 capsule    Take 1 tablet (300 mg) every night for 4-7 days, then 1 tablet twice daily for 4-7 days, then 1 tablet three times daily     Psychophysiological insomnia, FRANCIA (generalized anxiety disorder), MDD (major depressive disorder), recurrent, severe, with psychosis (H)       venlafaxine 150 MG 24 hr capsule    EFFEXOR-XR    30 capsule    TAKE 1 CAPSULE(150 MG) BY MOUTH DAILY    MDD (major depressive disorder), recurrent, severe, with psychosis (H), FRANCIA (generalized anxiety disorder)

## 2018-03-05 NOTE — NURSING NOTE
"Chief Complaint   Patient presents with     Mass       Initial /68  Pulse 64  Temp 98.3  F (36.8  C)  Ht 5' 3\" (1.6 m)  Wt 154 lb (69.9 kg)  BMI 27.28 kg/m2 Estimated body mass index is 27.28 kg/(m^2) as calculated from the following:    Height as of this encounter: 5' 3\" (1.6 m).    Weight as of this encounter: 154 lb (69.9 kg).  Medication Reconciliation: complete     Corbin Valencia      "
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
## 2018-03-06 ASSESSMENT — PATIENT HEALTH QUESTIONNAIRE - PHQ9: SUM OF ALL RESPONSES TO PHQ QUESTIONS 1-9: 7

## 2018-03-06 ASSESSMENT — ANXIETY QUESTIONNAIRES: GAD7 TOTAL SCORE: 6

## 2018-03-15 ENCOUNTER — MEDICAL CORRESPONDENCE (OUTPATIENT)
Dept: HEALTH INFORMATION MANAGEMENT | Facility: CLINIC | Age: 38
End: 2018-03-15

## 2018-03-15 ENCOUNTER — OFFICE VISIT (OUTPATIENT)
Dept: OBGYN | Facility: OTHER | Age: 38
End: 2018-03-15
Attending: OBSTETRICS & GYNECOLOGY
Payer: COMMERCIAL

## 2018-03-15 VITALS
DIASTOLIC BLOOD PRESSURE: 78 MMHG | HEART RATE: 68 BPM | WEIGHT: 154 LBS | HEIGHT: 63 IN | SYSTOLIC BLOOD PRESSURE: 130 MMHG | BODY MASS INDEX: 27.29 KG/M2

## 2018-03-15 DIAGNOSIS — Z20.2 EXPOSURE TO STD: ICD-10-CM

## 2018-03-15 DIAGNOSIS — B37.31 CANDIDIASIS OF VULVA AND VAGINA: Primary | ICD-10-CM

## 2018-03-15 LAB
SPECIMEN SOURCE: NORMAL
WET PREP SPEC: NORMAL

## 2018-03-15 PROCEDURE — G0463 HOSPITAL OUTPT CLINIC VISIT: HCPCS

## 2018-03-15 PROCEDURE — 87491 CHLMYD TRACH DNA AMP PROBE: CPT | Mod: ZL | Performed by: OBSTETRICS & GYNECOLOGY

## 2018-03-15 PROCEDURE — G0463 HOSPITAL OUTPT CLINIC VISIT: HCPCS | Mod: 25

## 2018-03-15 PROCEDURE — 99213 OFFICE O/P EST LOW 20 MIN: CPT | Performed by: OBSTETRICS & GYNECOLOGY

## 2018-03-15 PROCEDURE — 87591 N.GONORRHOEAE DNA AMP PROB: CPT | Mod: ZL | Performed by: OBSTETRICS & GYNECOLOGY

## 2018-03-15 PROCEDURE — 87210 SMEAR WET MOUNT SALINE/INK: CPT | Mod: ZL | Performed by: OBSTETRICS & GYNECOLOGY

## 2018-03-15 RX ORDER — NYSTATIN AND TRIAMCINOLONE ACETONIDE 100000; 1 [USP'U]/G; MG/G
CREAM TOPICAL 2 TIMES DAILY
Qty: 15 G | Refills: 1 | Status: SHIPPED | OUTPATIENT
Start: 2018-03-15 | End: 2018-10-04

## 2018-03-15 RX ORDER — FLUCONAZOLE 150 MG/1
150 TABLET ORAL
Qty: 4 TABLET | Refills: 0 | Status: SHIPPED | OUTPATIENT
Start: 2018-03-15 | End: 2018-03-23

## 2018-03-15 NOTE — NURSING NOTE
"Chief Complaint   Patient presents with     Vaginal Problem     vaginal burning and itching/pain w/intercourse       Initial /78  Pulse 68  Ht 5' 3\" (1.6 m)  Wt 154 lb (69.9 kg)  BMI 27.28 kg/m2 Estimated body mass index is 27.28 kg/(m^2) as calculated from the following:    Height as of this encounter: 5' 3\" (1.6 m).    Weight as of this encounter: 154 lb (69.9 kg).  Medication Reconciliation: cheli Goodson      "

## 2018-03-15 NOTE — MR AVS SNAPSHOT
After Visit Summary   3/15/2018    Brissa Santana    MRN: [id number]           Patient Information     Date Of Birth          1980        Visit Information        Provider Department      3/15/2018 2:00 PM Antelmo Jeong MD [facility] [place]        Today's Diagnoses     Candidiasis of vulva and vagina    -  1    Exposure to STD          Care Instructions    F/u 4 weeks          Follow-ups after your visit        Your next 10 appointments already scheduled     Mar 23, 2018  1:30 PM CDT   (Arrive by 1:15 PM)   New Visit with Sharif Yeboah MD   [facility] [place] ([facility] [place] [facility]    [address]  [place] [address]   [phone]            Apr 12, 2018  2:30 PM CDT   (Arrive by 2:15 PM)   SHORT with Antelmo Jeong MD   [facility] [place] ([facility] [place] )    [address]  [place] [address]   [phone]              Who to contact     If you have questions or need follow up information about today's clinic visit or your schedule please contact [facility] directly at [phone].  Normal or non-critical lab and imaging results will be communicated to you by MyChart, letter or phone within 4 business days after the clinic has received the results. If you do not hear from us within 7 days, please contact the clinic through [employer] or phone. If you have a critical or abnormal lab result, we will notify you by phone as soon as possible.  Submit refill requests through AppSheet or call your pharmacy and they will forward the refill request to us. Please allow 3 business days for your refill to be completed.          Additional Information About Your Visit        MyChart Information     AppSheet gives you secure access to your electronic health record. If you see a primary care provider, you can also send messages to your care team and make appointments. If you have questions, please call your primary care clinic.  If you do  "not have a primary care provider, please call [phone] and they will assist you.        Care EveryWhere ID     This is your Care EveryWhere ID. This could be used by other organizations to access your [place] medical records  [id number]        Your Vitals Were     Pulse Height BMI (Body Mass Index)             68 5' 3\" (1.6 m) 27.28 kg/m2          Blood Pressure from Last 3 Encounters:   03/15/18 130/78   03/05/18 122/68   10/03/17 112/63    Weight from Last 3 Encounters:   03/15/18 154 lb (69.9 kg)   03/05/18 154 lb (69.9 kg)   10/03/17 140 lb (63.5 kg)              We Performed the Following     GC/Chlamydia by PCR - HI,MICHAEL     Wet prep          Today's Medication Changes          These changes are accurate as of 3/15/18  7:25 PM.  If you have any questions, ask your nurse or doctor.               Start taking these medicines.        Dose/Directions    fluconazole 150 MG tablet   Commonly known as:  DIFLUCAN   Used for:  Candidiasis of vulva and vagina   Started by:  Antelmo Jeong MD        Dose:  150 mg   Take 1 tablet (150 mg) by mouth every 3 days   Quantity:  4 tablet   Refills:  0       nystatin-triamcinolone cream   Commonly known as:  MYCOLOG II   Used for:  Candidiasis of vulva and vagina   Started by:  Antelmo Jeong MD        Apply topically 2 times daily   Quantity:  15 g   Refills:  1            Where to get your medicines      These medications were sent to [facility] [address] [place] [address] [facility] [address] AT [facility] [address], [place] [address]     Phone:  [phone]     fluconazole 150 MG tablet    nystatin-triamcinolone cream                Primary Care Provider Office Phone # Fax #    Abhilash Philip -[phone]       [address] [place] [address]  [place] [address]        Equal Access to Services     [facility]: Traci ambrocio Sominor, waaxda luqadaha, qaybta kaalmada ketihegjohn, ronnell castaneda. So [facility] " [phone].    ATENCIÓN: Si bethany estrada, tiene a anderson disposición servicios gratuitos de asistencia lingüística. Ashley gomez [phone].    We comply with applicable federal civil rights laws and [place] laws. We do not discriminate on the basis of race, color, national origin, age, disability, sex, sexual orientation, or gender identity.            Thank you!     Thank you for choosing [facility]  for your care. Our goal is always to provide you with excellent care. Hearing back from our patients is one way we can continue to improve our services. Please take a few minutes to complete the written survey that you may receive in the mail after your visit with us. Thank you!             Your Updated Medication List - Protect others around you: Learn how to safely use, store and throw away your medicines at www.disposemymeds.org.          This list is accurate as of 3/15/18  7:25 PM.  Always use your most recent med list.                   Brand Name Dispense Instructions for use Diagnosis    fluconazole 150 MG tablet    DIFLUCAN    4 tablet    Take 1 tablet (150 mg) by mouth every 3 days    Candidiasis of vulva and vagina       gabapentin 300 MG capsule    NEURONTIN    90 capsule    Take 1 tablet (300 mg) every night for 4-7 days, then 1 tablet twice daily for 4-7 days, then 1 tablet three times daily    Psychophysiological insomnia, FRANCIA (generalized anxiety disorder), MDD (major depressive disorder), recurrent, severe, with psychosis (H)       nystatin-triamcinolone cream    MYCOLOG II    15 g    Apply topically 2 times daily    Candidiasis of vulva and vagina       venlafaxine 150 MG 24 hr capsule    EFFEXOR-XR    30 capsule    TAKE 1 CAPSULE(150 MG) BY MOUTH DAILY    MDD (major depressive disorder), recurrent, severe, with psychosis (H), FRANCIA (generalized anxiety disorder)

## 2018-03-16 LAB
C TRACH DNA SPEC QL PROBE+SIG AMP: NOT DETECTED
N GONORRHOEA DNA SPEC QL PROBE+SIG AMP: NOT DETECTED
SPECIMEN SOURCE: NORMAL

## 2018-03-16 NOTE — PROGRESS NOTES
S:         Patient Active Problem List   Diagnosis     Tramadol dependency and abuse     Drug overdose     ACP (advance care planning)     MDD (major depressive disorder), recurrent, severe, with psychosis (H)     FRANCIA (generalized anxiety disorder)     Chemical dependency (H)     Neck pain     History of reduction mammoplasty     History of spinal surgery     Insomnia     Inflammation of sacroiliac joint (H)     NO SHOW     CC:  Vaginal burning/irritation/dyspareunia.  36 yo P2 (CS)Pt c/o 2 months of above.  H/o recurrent BV.  Takes flagyl with intercourse.  + pain/burning/dyspareunia on penetration.  Also she has noticed whitish patches and erythema inner labia. Whitish DC.  Denies STD concerns.  Pap UTD.   BTO for BC.        Past Medical History:   Diagnosis Date     Backache, unspecified 2007     Chemical dependency (H) 2016     FRANCIA (generalized anxiety disorder)      Heroin use 2013     Lumbago 2003     MDD (major depressive disorder), recurrent, severe, with psychosis (H)      Polypharmacy -h/o tramadol,ambien,narcotic depend. 2013     Tramadol dependency and abuse 2011            Past Surgical History:   Procedure Laterality Date     APPENDECTOMY       BACK SURGERY      lumbar diskectomy with fusion      SECTION      x2     D & C       OTHER SURGICAL HISTORY      Breast Reduction     TONSILLECTOMY       TUBAL LIGATION              Social History   Substance Use Topics     Smoking status: Former Smoker     Packs/day: 1.00     Years: 15.00     Types: Cigarettes     Quit date: 3/11/2017     Smokeless tobacco: Never Used      Comment: Tried to quit (yes); longest period tobacco-free- 9 months     Alcohol use No            Family History   Problem Relation Age of Onset     Breast Cancer Maternal Grandmother      CANCER Paternal Grandmother      Hepatitis Paternal Uncle      Hep C     Hepatitis Father      Hep C               Allergies   Allergen Reactions     Latex      " Irritation; see comments            Current Outpatient Prescriptions   Medication Sig Dispense Refill     fluconazole (DIFLUCAN) 150 MG tablet Take 1 tablet (150 mg) by mouth every 3 days 4 tablet 0     nystatin-triamcinolone (MYCOLOG II) cream Apply topically 2 times daily 15 g 1     gabapentin (NEURONTIN) 300 MG capsule Take 1 tablet (300 mg) every night for 4-7 days, then 1 tablet twice daily for 4-7 days, then 1 tablet three times daily 90 capsule 1     venlafaxine (EFFEXOR-XR) 150 MG 24 hr capsule TAKE 1 CAPSULE(150 MG) BY MOUTH DAILY 30 capsule 1          Review Of Systems  Constitutional:  Denies fever  GI/ negative except as noted per hpi    O:   /78  Pulse 68  Ht 5' 3\" (1.6 m)  Wt 154 lb (69.9 kg)  BMI 27.28 kg/m2  Gen:  NAD, A and O  Vitals: /78  Pulse 68  Ht 5' 3\" (1.6 m)  Wt 154 lb (69.9 kg)  BMI 27.28 kg/m2  BMI= Body mass index is 27.28 kg/(m^2).  Abdomin soft, non tender, non distended  Lymphadenopathy:  negative  Vulva: No lesions, erythema, BUS wnl.  Inner labia with mild leukoplakia/whitish DCnd erythema  Vagina: Pink, moist mucosa with rugae,no lesions  Cervix: No lesions, no CMT  Uterus: Mid position, normal size and count our, mobile, non tender  Adnexa: Non-palpable, non tender, no masses  Anus without lesions  Extremities.  negative           A:  Suspected chronic yeast vaginitis.      P:  Wet prep and cervical cx done.  Diflucan rx.  150mg q 3 days x 4.  Topical mycology cream.   F/u 4 weeks.  If no improvement consider vulvar bx.  Pt agrees with POC.    "

## 2018-03-23 ENCOUNTER — OFFICE VISIT (OUTPATIENT)
Dept: SURGERY | Facility: OTHER | Age: 38
End: 2018-03-23
Attending: FAMILY MEDICINE
Payer: COMMERCIAL

## 2018-03-23 VITALS
SYSTOLIC BLOOD PRESSURE: 144 MMHG | HEIGHT: 63 IN | BODY MASS INDEX: 27.29 KG/M2 | WEIGHT: 154 LBS | HEART RATE: 122 BPM | TEMPERATURE: 98.4 F | OXYGEN SATURATION: 98 % | DIASTOLIC BLOOD PRESSURE: 94 MMHG

## 2018-03-23 DIAGNOSIS — D17.30 LIPOMA OF SKIN AND SUBCUTANEOUS TISSUE: ICD-10-CM

## 2018-03-23 DIAGNOSIS — K64.9 HEMORRHOIDS, UNSPECIFIED HEMORRHOID TYPE: ICD-10-CM

## 2018-03-23 PROCEDURE — G0463 HOSPITAL OUTPT CLINIC VISIT: HCPCS

## 2018-03-23 PROCEDURE — 99204 OFFICE O/P NEW MOD 45 MIN: CPT | Performed by: SURGERY

## 2018-03-23 RX ORDER — POLYETHYLENE GLYCOL 3350 17 G/17G
POWDER, FOR SOLUTION ORAL
Qty: 1 BOTTLE | Refills: 0 | Status: SHIPPED | OUTPATIENT
Start: 2018-03-23 | End: 2018-10-04

## 2018-03-23 RX ORDER — BISACODYL 5 MG/1
5 TABLET, DELAYED RELEASE ORAL SEE ADMIN INSTRUCTIONS
Qty: 8 TABLET | Refills: 0 | Status: SHIPPED | OUTPATIENT
Start: 2018-03-23 | End: 2018-10-04

## 2018-03-23 ASSESSMENT — PAIN SCALES - GENERAL: PAINLEVEL: NO PAIN (0)

## 2018-03-23 NOTE — PATIENT INSTRUCTIONS
Thank you for allowing Dr. Yeboah and our surgical team to participate in your care.  If you have a scheduling or an appointment question please contact Yesi, our Health Unit Coordinator, at her direct line [phone].   ALL nursing questions or concerns can be directed to your surgical nurse at: [phone]-Maile    You are scheduled for a: Colonoscopy with possible biopsy   Your procedure date is: 04/19/2018    You have been ordered Gatorade Prep as your mechanical bowel prep. Please pick this up from your preferred pharmacy. They are all over the counter    Eight 5 mg Dulcolax tablets  One 8.3 ounce bottle of miralax  One 64 ounce bottle of gatorade-No red or purple    Clear Liquid diet includes:    Tea, coffee (no cream), water, vitamin water, smart water, coconut water, powerade, propel, soda pop (sprite, 7 up, Ginger Ale, Gatorade (not red or purple), clear nutrition drinks (resource breeze, ensure active protein drink (peach flavor), jello-o, popsicles (no milk or fruit pieces) or Italian Ice (not red or purple), fat free soup broth or bouillon, plain hard candy such as clear life savers, powdered lemonade such as crystal light or country time, clear juices and fruit-flavored drinks such as apple juice, white grape juice, Hi-C and Pavan-Aid, Honey and sugar      The day before your colonoscopy:    ~Clear liquids all day.  No red or purple colors.  Do not eat any other solid food the rest of the day.  ~Begin drinking clear liquids.  Drink at least 8-10 full glasses of clear liquid during the day.      The day before your colonoscopy: 04/18/2018    At 3PM:   ~Take the 4 bisacodyl (Dulcolax) tablets with 8 ounces of clear liquids    At 6 PM:   ~ Mix one bottle of Miralax (8.3 ounces) with 64 ounces of Gatorade (not red or purple colored) in a large pitcher.  ~Drink one glass of the Miralax/Gatorade solution  ~Continue drinking one 8 ounce glass every 15 minutes thereafter until the mixture is gone.    At 9 PM:    ~Take the 4 bisacodyl (Dulcolax) tablets with 8 ounces of clear liquids.    Continue the clear liquid diet up until 3 hours prior to your admission time, then stop.        ~Please arrive with an adult who can take you home after the test and stay with your for the next four hours.  The medicine used in this test will make you sleepy.  ~If you have asthma, bring your inhalers with you.    If you do not have someone to DRIVE you home and stay with you for four hours, please reschedule or we will cancel your test.          HOW TO PREPARE-        You need a friend or family member available to drive you home AND stay with you for 4 hours after you leave the hospital. You will not be allowed to drive yourself. IF you need to take a taxi or the bus you MUST have a responsible person to ride with you. YOUR PROCEDURE WILL BE CANCELLED IF YOU DO NOT HAVE A RESPONSIBLE ADULT TO DRIVE YOU HOME.       You need to call our Surgery Education Nurses 1-2 weeks prior to your surgery date at [phone] or toll free [phone]. Please have you medication and allergy lists ready.       Stop your aspirin or other NSAIDs(Ibuprofen, Motrin, Aleve, Celebrex, Naproxen, etc...) 7 days before your surgery.      Hospital admitting will call you the day before your surgery with your arrival time. If you are scheduled on a Monday admitting will call you the Friday before.      Please call your primary care physician if you should become ill within 24 hours of scheduled surgery. (ex.vomiting, diarrhea, fever)

## 2018-03-23 NOTE — NURSING NOTE
"Chief Complaint   Patient presents with     Consult     lipoma , possible hemorrhoids, referred by Dr. Philip       Initial BP (!) 144/94  Pulse 122  Temp 98.4  F (36.9  C) (Tympanic)  Ht 5' 3\" (1.6 m)  Wt 154 lb (69.9 kg)  SpO2 98%  BMI 27.28 kg/m2 Estimated body mass index is 27.28 kg/(m^2) as calculated from the following:    Height as of this encounter: 5' 3\" (1.6 m).    Weight as of this encounter: 154 lb (69.9 kg).  Medication Reconciliation: complete     Mary Lucas      "

## 2018-03-23 NOTE — PROGRESS NOTES
[facility] [place] Surgery Consultation    CC:  Symptomatic hemorrhoids            Left shoulder mass      HPI:  This 37 year old year old female is seen at the request of Dr. Philip for evaluation of symptomatic hemorrhoids and left arm mass. She has been dealing with hemorrhoids for the last 18 years. She admits to aching pain, blood with every bowel movement. She does a sitz bath with every bowel movement. She admits to having to manually push her hemorrhoids back in. She denies ever having a colonoscopy.     She has also noticed a mass on the back of her left shoulder. It is painless but she has noticed it is increasing in size. She is otherwise doing well, denies chest pain shortness of breath.       Past Medical History:   Diagnosis Date     Backache, unspecified 2007     Chemical dependency (H) 2016     FRANCIA (generalized anxiety disorder)      Heroin use 2013     Lumbago 2003     MDD (major depressive disorder), recurrent, severe, with psychosis (H)      Polypharmacy -h/o tramadol,ambien,narcotic depend. 2013     Tramadol dependency and abuse 2011       Past Surgical History:   Procedure Laterality Date     APPENDECTOMY       BACK SURGERY      lumbar diskectomy with fusion      SECTION      x2     D & C       OTHER SURGICAL HISTORY      Breast Reduction     TONSILLECTOMY       TUBAL LIGATION         Allergies   Allergen Reactions     Latex      Irritation; see comments       Current Outpatient Prescriptions   Medication     polyethylene glycol (MIRALAX) powder     bisacodyl (DULCOLAX) 5 MG EC tablet     nystatin-triamcinolone (MYCOLOG II) cream     gabapentin (NEURONTIN) 300 MG capsule     venlafaxine (EFFEXOR-XR) 150 MG 24 hr capsule     No current facility-administered medications for this visit.        HABITS:    Social History   Substance Use Topics     Smoking status: Former Smoker     Packs/day: 1.00     Years: 15.00     Types: Cigarettes     Quit date:  "3/11/2017     Smokeless tobacco: Never Used      Comment: Tried to quit (yes); longest period tobacco-free- 9 months     Alcohol use No       Family History   Problem Relation Age of Onset     Breast Cancer Maternal Grandmother      CANCER Paternal Grandmother      Hepatitis Paternal Uncle      Hep C     Hepatitis Father      Hep C       REVIEW OF SYSTEMS:  Ten point review of systems negative except those mentioned in the HPI.     OBJECTIVE:    BP (!) 144/94  Pulse 122  Temp 98.4  F (36.9  C) (Tympanic)  Ht 5' 3\" (1.6 m)  Wt 154 lb (69.9 kg)  SpO2 98%  BMI 27.28 kg/m2    GENERAL: Generally appears well, in no distress with appropriate affect.  HEENT:   Sclerae anicteric - No cervical, supra/infraclavciular lymphadenopathy, Respiratory:  No acute distress, no splinting   Cardiovascular:  Regular Rate and Rhythm  Perineum: posterior right external hemorrhoidal tissue, hypertrophied internal hemorrhoids by anoscopy. No fissure    :  deferred  Extremities:  Extremities normal. No deformities, edema, or skin discoloration.  Skin:  no suspicious lesions or rashes, left shoulder 3-4 cm mobile mass indistinct boarder on the axillary size.   Neurological: grossly intact  Psych:  Alert, oriented, affect appropriate with normal decision making ability.    IMPRESSION:      Grade IV internal hemorrhoids by history, that are significantly symptomatic, discussed that we do not remove them for there presence but their symptoms. She has bleeding with all bowel movements and requires a bath to help soothe her irritation with all bowel movements. Will plan on total hemorrhoidectomy with diagnotic colonoscopy prior.     As far as the lipoma a little to big to remove in clinic will discuss removal at her follow up appointment.     PLAN:    Hemorrhoidectomy with diagnostic colonoscopy   Hold on lipoma for now     Sharif Yeboah MD,     3/23/2018  4:09 PM      "

## 2018-03-23 NOTE — MR AVS SNAPSHOT
After Visit Summary   3/23/2018    Brissa Santana    MRN: [id number]           Patient Information     Date Of Birth          1980        Visit Information        Provider Department      3/23/2018 1:30 PM Sharif Yeboah MD [facility]        Today's Diagnoses     Lipoma of skin and subcutaneous tissue        Hemorrhoids, unspecified hemorrhoid type          Care Instructions    Thank you for allowing Dr. Yeboah and our surgical team to participate in your care.  If you have a scheduling or an appointment question please contact Yesi, our Health Unit Coordinator, at her direct line [phone].   ALL nursing questions or concerns can be directed to your surgical nurse at: [phone]-Maile    You are scheduled for a: Colonoscopy with possible biopsy   Your procedure date is: 04/19/2018    You have been ordered Gatorade Prep as your mechanical bowel prep. Please pick this up from your preferred pharmacy. They are all over the counter    Eight 5 mg Dulcolax tablets  One 8.3 ounce bottle of miralax  One 64 ounce bottle of gatorade-No red or purple    Clear Liquid diet includes:    Tea, coffee (no cream), water, vitamin water, smart water, coconut water, powerade, propel, soda pop (sprite, 7 up, Ginger Ale, Gatorade (not red or purple), clear nutrition drinks (resource breeze, ensure active protein drink (peach flavor), jello-o, popsicles (no milk or fruit pieces) or Italian Ice (not red or purple), fat free soup broth or bouillon, plain hard candy such as clear life savers, powdered lemonade such as crystal light or country time, clear juices and fruit-flavored drinks such as apple juice, white grape juice, Hi-C and Pavan-Aid, Honey and sugar      The day before your colonoscopy:    ~Clear liquids all day.  No red or purple colors.  Do not eat any other solid food the rest of the day.  ~Begin drinking clear liquids.  Drink at least 8-10 full glasses of clear liquid during the day.       The day before your colonoscopy: 04/18/2018    At 3PM:   ~Take the 4 bisacodyl (Dulcolax) tablets with 8 ounces of clear liquids    At 6 PM:   ~ Mix one bottle of Miralax (8.3 ounces) with 64 ounces of Gatorade (not red or purple colored) in a large pitcher.  ~Drink one glass of the Miralax/Gatorade solution  ~Continue drinking one 8 ounce glass every 15 minutes thereafter until the mixture is gone.    At 9 PM:   ~Take the 4 bisacodyl (Dulcolax) tablets with 8 ounces of clear liquids.    Continue the clear liquid diet up until 3 hours prior to your admission time, then stop.        ~Please arrive with an adult who can take you home after the test and stay with your for the next four hours.  The medicine used in this test will make you sleepy.  ~If you have asthma, bring your inhalers with you.    If you do not have someone to DRIVE you home and stay with you for four hours, please reschedule or we will cancel your test.          HOW TO PREPARE-        You need a friend or family member available to drive you home AND stay with you for 4 hours after you leave the hospital. You will not be allowed to drive yourself. IF you need to take a taxi or the bus you MUST have a responsible person to ride with you. YOUR PROCEDURE WILL BE CANCELLED IF YOU DO NOT HAVE A RESPONSIBLE ADULT TO DRIVE YOU HOME.       You need to call our Surgery Education Nurses 1-2 weeks prior to your surgery date at [phone] or toll free [phone]. Please have you medication and allergy lists ready.       Stop your aspirin or other NSAIDs(Ibuprofen, Motrin, Aleve, Celebrex, Naproxen, etc...) 7 days before your surgery.      Hospital admitting will call you the day before your surgery with your arrival time. If you are scheduled on a Monday admitting will call you the Friday before.      Please call your primary care physician if you should become ill within 24 hours of scheduled surgery. (ex.vomiting, diarrhea, fever)               "Follow-ups after your visit        Your next 10 appointments already scheduled     Apr 12, 2018  2:30 PM CDT   (Arrive by 2:15 PM)   SHORT with Antelmo Jeong MD   [facility] ([facility] )    [place] [address]   [phone]              Who to contact     If you have questions or need follow up information about today's clinic visit or your schedule please contact [facility] directly at [phone].  Normal or non-critical lab and imaging results will be communicated to you by Bueen[employer]art, letter or phone within 4 business days after the clinic has received the results. If you do not hear from us within 7 days, please contact the clinic through [employer] or phone. If you have a critical or abnormal lab result, we will notify you by phone as soon as possible.  Submit refill requests through [employer] or call your pharmacy and they will forward the refill request to us. Please allow 3 business days for your refill to be completed.          Additional Information About Your Visit        Bueenohart Information     [employer] gives you secure access to your electronic health record. If you see a primary care provider, you can also send messages to your care team and make appointments. If you have questions, please call your primary care clinic.  If you do not have a primary care provider, please call [phone] and they will assist you.        Care EveryWhere ID     This is your Care EveryWhere ID. This could be used by other organizations to access your [facility] medical records  [id number]        Your Vitals Were     Pulse Temperature Height Pulse Oximetry BMI (Body Mass Index)       122 98.4  F (36.9  C) (Tympanic) 5' 3\" (1.6 m) 98% 27.28 kg/m2        Blood Pressure from Last 3 Encounters:   03/23/18 (!) 144/94   03/15/18 130/78   03/05/18 122/68    Weight from Last 3 Encounters:   03/23/18 154 lb (69.9 kg)   03/15/18 154 lb (69.9 kg)   03/05/18 154 lb (69.9 " kg)              Today, you had the following     No orders found for display         Today's Medication Changes          These changes are accurate as of 3/23/18  2:07 PM.  If you have any questions, ask your nurse or doctor.               Stop taking these medicines if you haven't already. Please contact your care team if you have questions.     fluconazole 150 MG tablet   Commonly known as:  DIFLUCAN                    Primary Care Provider Office Phone # Fax #    Abhilash Philip [phone] [address] [address]        Equal Access to Services     [facility] : Hadii gracie ku hadasho Soomaali, waaxda luqadaha, qaybta kaalmada adeegyada, ronnell ramirez . So [facility] [phone].    ATENCIÓN: Si habla español, tiene a anderson disposición servicios gratuitos de asistencia lingüística. [facility] [phone].    We comply with applicable federal civil rights laws and [place] laws. We do not discriminate on the basis of race, color, national origin, age, disability, sex, sexual orientation, or gender identity.            Thank you!     Thank you for choosing [facility]  for your care. Our goal is always to provide you with excellent care. Hearing back from our patients is one way we can continue to improve our services. Please take a few minutes to complete the written survey that you may receive in the mail after your visit with us. Thank you!             Your Updated Medication List - Protect others around you: Learn how to safely use, store and throw away your medicines at www.disposemymeds.org.          This list is accurate as of 3/23/18  2:07 PM.  Always use your most recent med list.                   Brand Name Dispense Instructions for use Diagnosis    gabapentin 300 MG capsule    NEURONTIN    90 capsule    Take 1 tablet (300 mg) every night for 4-7 days, then 1 tablet twice daily for 4-7 days, then 1 tablet three times daily     Psychophysiological insomnia, FRANCIA (generalized anxiety disorder), MDD (major depressive disorder), recurrent, severe, with psychosis (H)       nystatin-triamcinolone cream    MYCOLOG II    15 g    Apply topically 2 times daily    Candidiasis of vulva and vagina       venlafaxine 150 MG 24 hr capsule    EFFEXOR-XR    30 capsule    TAKE 1 CAPSULE(150 MG) BY MOUTH DAILY    MDD (major depressive disorder), recurrent, severe, with psychosis (H), FRANCIA (generalized anxiety disorder)

## 2018-04-04 DIAGNOSIS — F33.3 MDD (MAJOR DEPRESSIVE DISORDER), RECURRENT, SEVERE, WITH PSYCHOSIS (H): ICD-10-CM

## 2018-04-04 DIAGNOSIS — F41.1 GAD (GENERALIZED ANXIETY DISORDER): ICD-10-CM

## 2018-04-04 DIAGNOSIS — F51.04 PSYCHOPHYSIOLOGICAL INSOMNIA: ICD-10-CM

## 2018-04-04 RX ORDER — GABAPENTIN 300 MG/1
300 CAPSULE ORAL 3 TIMES DAILY
Qty: 90 CAPSULE | Refills: 1 | Status: SHIPPED | OUTPATIENT
Start: 2018-04-04 | End: 2018-05-02

## 2018-04-05 NOTE — H&P (VIEW-ONLY)
[facility] Surgery Consultation    CC:  Symptomatic hemorrhoids            Left shoulder mass      HPI:  This 37 year old year old female is seen at the request of Dr. Philip for evaluation of symptomatic hemorrhoids and left arm mass. She has been dealing with hemorrhoids for the last 18 years. She admits to aching pain, blood with every bowel movement. She does a sitz bath with every bowel movement. She admits to having to manually push her hemorrhoids back in. She denies ever having a colonoscopy.     She has also noticed a mass on the back of her left shoulder. It is painless but she has noticed it is increasing in size. She is otherwise doing well, denies chest pain shortness of breath.       Past Medical History:   Diagnosis Date     Backache, unspecified 2007     Chemical dependency (H) 2016     FRANCIA (generalized anxiety disorder)      Heroin use 2013     Lumbago 2003     MDD (major depressive disorder), recurrent, severe, with psychosis (H)      Polypharmacy -h/o tramadol,ambien,narcotic depend. 2013     Tramadol dependency and abuse 2011       Past Surgical History:   Procedure Laterality Date     APPENDECTOMY       BACK SURGERY      lumbar diskectomy with fusion      SECTION      x2     D & C       OTHER SURGICAL HISTORY      Breast Reduction     TONSILLECTOMY       TUBAL LIGATION         Allergies   Allergen Reactions     Latex      Irritation; see comments       Current Outpatient Prescriptions   Medication     polyethylene glycol (MIRALAX) powder     bisacodyl (DULCOLAX) 5 MG EC tablet     nystatin-triamcinolone (MYCOLOG II) cream     gabapentin (NEURONTIN) 300 MG capsule     venlafaxine (EFFEXOR-XR) 150 MG 24 hr capsule     No current facility-administered medications for this visit.        HABITS:    Social History   Substance Use Topics     Smoking status: Former Smoker     Packs/day: 1.00     Years: 15.00     Types: Cigarettes     Quit date:  "3/11/2017     Smokeless tobacco: Never Used      Comment: Tried to quit (yes); longest period tobacco-free- 9 months     Alcohol use No       Family History   Problem Relation Age of Onset     Breast Cancer Maternal Grandmother      CANCER Paternal Grandmother      Hepatitis Paternal Uncle      Hep C     Hepatitis Father      Hep C       REVIEW OF SYSTEMS:  Ten point review of systems negative except those mentioned in the HPI.     OBJECTIVE:    BP (!) 144/94  Pulse 122  Temp 98.4  F (36.9  C) (Tympanic)  Ht 5' 3\" (1.6 m)  Wt 154 lb (69.9 kg)  SpO2 98%  BMI 27.28 kg/m2    GENERAL: Generally appears well, in no distress with appropriate affect.  HEENT:   Sclerae anicteric - No cervical, supra/infraclavciular lymphadenopathy, Respiratory:  No acute distress, no splinting   Cardiovascular:  Regular Rate and Rhythm  Perineum: posterior right external hemorrhoidal tissue, hypertrophied internal hemorrhoids by anoscopy. No fissure    :  deferred  Extremities:  Extremities normal. No deformities, edema, or skin discoloration.  Skin:  no suspicious lesions or rashes, left shoulder 3-4 cm mobile mass indistinct boarder on the axillary size.   Neurological: grossly intact  Psych:  Alert, oriented, affect appropriate with normal decision making ability.    IMPRESSION:      Grade IV internal hemorrhoids by history, that are significantly symptomatic, discussed that we do not remove them for there presence but their symptoms. She has bleeding with all bowel movements and requires a bath to help soothe her irritation with all bowel movements. Will plan on total hemorrhoidectomy with diagnotic colonoscopy prior.     As far as the lipoma a little to big to remove in clinic will discuss removal at her follow up appointment.     PLAN:    Hemorrhoidectomy with diagnostic colonoscopy   Hold on lipoma for now     Sharif Yeboah MD,     3/23/2018  4:09 PM      "

## 2018-04-06 ENCOUNTER — TELEPHONE (OUTPATIENT)
Dept: FAMILY MEDICINE | Facility: OTHER | Age: 38
End: 2018-04-06

## 2018-04-06 NOTE — TELEPHONE ENCOUNTER
I am pulling the Bull Shit card on her -- Number one she over used the f Rx I gave her and used over the amt for the first time she filled and I assume she overused this last Rx due to her lame excuse of leaving it in [facility] bathroom.  Will not refill this and recommend seeing me  To discuss further in clinic.  Lying will not help her addiction problem or improve her doctor patient relationship.    NEEDS to work harder on her chemical dependency issues.     Corbin vicente may read this to her.

## 2018-04-06 NOTE — TELEPHONE ENCOUNTER
10:56 AM    Reason for Call: Phone Call    Description: Pt called and states that she left her gabapentin (NEURONTIN) 300 MG capsule in the bathroom at [facility] and would like to see if she could get this refilled and sent to her pharmacy because she has been out of this and is feeling sick.     Was an appointment offered for this call? No  If yes : Appointment type              Date    Preferred method for responding to this message: Telephone Call  What is your phone number ?[phone]    If we cannot reach you directly, may we leave a detailed response at the number you provided? Yes    Can this message wait until your PCP/provider returns, if available today? Not applicable, PCP is in     Demetrice Velásquez

## 2018-04-10 ENCOUNTER — TELEPHONE (OUTPATIENT)
Dept: FAMILY MEDICINE | Facility: OTHER | Age: 38
End: 2018-04-10

## 2018-04-10 NOTE — TELEPHONE ENCOUNTER
1:00 PM    Reason for Call: OVERBOOK    Patient is having the following symptoms: Lost med    The patient is requesting an appointment for Thurs with Dr Philip.    Was an appointment offered for this call? Yes  If yes : Appointment type short              Date 04/11/18    Preferred method for responding to this message: Telephone Call  What is your phone number ?  [phone]    If we cannot reach you directly, may we leave a detailed response at the number you provided? Yes    Can this message wait until your PCP/provider returns, if unavailable today? Not applicable    Sara Mondragon

## 2018-04-19 ENCOUNTER — ANESTHESIA (OUTPATIENT)
Dept: SURGERY | Facility: HOSPITAL | Age: 38
End: 2018-04-19
Payer: COMMERCIAL

## 2018-04-19 ENCOUNTER — ANESTHESIA EVENT (OUTPATIENT)
Dept: SURGERY | Facility: HOSPITAL | Age: 38
End: 2018-04-19
Payer: COMMERCIAL

## 2018-04-19 ENCOUNTER — APPOINTMENT (OUTPATIENT)
Dept: LAB | Facility: HOSPITAL | Age: 38
End: 2018-04-19
Attending: SURGERY
Payer: COMMERCIAL

## 2018-04-19 ENCOUNTER — HOSPITAL ENCOUNTER (OUTPATIENT)
Facility: HOSPITAL | Age: 38
Discharge: HOME | End: 2018-04-19
Attending: SURGERY | Admitting: SURGERY
Payer: COMMERCIAL

## 2018-04-19 VITALS
RESPIRATION RATE: 13 BRPM | SYSTOLIC BLOOD PRESSURE: 135 MMHG | OXYGEN SATURATION: 97 % | TEMPERATURE: 98.8 F | DIASTOLIC BLOOD PRESSURE: 90 MMHG

## 2018-04-19 DIAGNOSIS — Z87.19 S/P HEMORRHOIDECTOMY: Primary | ICD-10-CM

## 2018-04-19 DIAGNOSIS — Z98.890 S/P HEMORRHOIDECTOMY: Primary | ICD-10-CM

## 2018-04-19 LAB — HCG UR QL: NEGATIVE

## 2018-04-19 PROCEDURE — 25000132 ZZH RX MED GY IP 250 OP 250 PS 637: Performed by: SURGERY

## 2018-04-19 PROCEDURE — 71000014 ZZH RECOVERY PHASE 1 LEVEL 2 FIRST HR: Performed by: SURGERY

## 2018-04-19 PROCEDURE — 45378 DIAGNOSTIC COLONOSCOPY: CPT | Performed by: SURGERY

## 2018-04-19 PROCEDURE — 25000128 H RX IP 250 OP 636: Performed by: SURGERY

## 2018-04-19 PROCEDURE — 40000305 ZZH STATISTIC PRE PROC ASSESS I: Performed by: SURGERY

## 2018-04-19 PROCEDURE — 25000125 ZZHC RX 250: Performed by: SURGERY

## 2018-04-19 PROCEDURE — C9290 INJ, BUPIVACAINE LIPOSOME: HCPCS | Performed by: SURGERY

## 2018-04-19 PROCEDURE — 01999 UNLISTED ANES PROCEDURE: CPT | Performed by: NURSE ANESTHETIST, CERTIFIED REGISTERED

## 2018-04-19 PROCEDURE — 36000052 ZZH SURGERY LEVEL 2 EA 15 ADDTL MIN: Performed by: SURGERY

## 2018-04-19 PROCEDURE — 25000128 H RX IP 250 OP 636: Performed by: NURSE ANESTHETIST, CERTIFIED REGISTERED

## 2018-04-19 PROCEDURE — 37000009 ZZH ANESTHESIA TECHNICAL FEE, EACH ADDTL 15 MIN: Performed by: SURGERY

## 2018-04-19 PROCEDURE — 25000566 ZZH SEVOFLURANE, EA 15 MIN: Performed by: NURSE ANESTHETIST, CERTIFIED REGISTERED

## 2018-04-19 PROCEDURE — 88304 TISSUE EXAM BY PATHOLOGIST: CPT | Mod: TC | Performed by: SURGERY

## 2018-04-19 PROCEDURE — 27210794 ZZH OR GENERAL SUPPLY STERILE: Performed by: SURGERY

## 2018-04-19 PROCEDURE — 37000008 ZZH ANESTHESIA TECHNICAL FEE, 1ST 30 MIN: Performed by: SURGERY

## 2018-04-19 PROCEDURE — 46260 REMOVE IN/EX HEM GROUPS 2+: CPT | Performed by: SURGERY

## 2018-04-19 PROCEDURE — 25000125 ZZHC RX 250: Performed by: NURSE ANESTHETIST, CERTIFIED REGISTERED

## 2018-04-19 PROCEDURE — 71000027 ZZH RECOVERY PHASE 2 EACH 15 MINS: Performed by: SURGERY

## 2018-04-19 PROCEDURE — 36000050 ZZH SURGERY LEVEL 2 1ST 30 MIN: Performed by: SURGERY

## 2018-04-19 PROCEDURE — 81025 URINE PREGNANCY TEST: CPT | Performed by: SURGERY

## 2018-04-19 RX ORDER — ALBUTEROL SULFATE 0.83 MG/ML
2.5 SOLUTION RESPIRATORY (INHALATION) EVERY 4 HOURS PRN
Status: DISCONTINUED | OUTPATIENT
Start: 2018-04-19 | End: 2018-04-19 | Stop reason: HOSPADM

## 2018-04-19 RX ORDER — FENTANYL CITRATE 50 UG/ML
INJECTION, SOLUTION INTRAMUSCULAR; INTRAVENOUS PRN
Status: DISCONTINUED | OUTPATIENT
Start: 2018-04-19 | End: 2018-04-19

## 2018-04-19 RX ORDER — NALOXONE HYDROCHLORIDE 0.4 MG/ML
.1-.4 INJECTION, SOLUTION INTRAMUSCULAR; INTRAVENOUS; SUBCUTANEOUS
Status: DISCONTINUED | OUTPATIENT
Start: 2018-04-19 | End: 2018-04-19 | Stop reason: HOSPADM

## 2018-04-19 RX ORDER — DEXAMETHASONE SODIUM PHOSPHATE 10 MG/ML
INJECTION, SOLUTION INTRAMUSCULAR; INTRAVENOUS PRN
Status: DISCONTINUED | OUTPATIENT
Start: 2018-04-19 | End: 2018-04-19

## 2018-04-19 RX ORDER — SODIUM CHLORIDE, SODIUM LACTATE, POTASSIUM CHLORIDE, CALCIUM CHLORIDE 600; 310; 30; 20 MG/100ML; MG/100ML; MG/100ML; MG/100ML
INJECTION, SOLUTION INTRAVENOUS CONTINUOUS
Status: DISCONTINUED | OUTPATIENT
Start: 2018-04-19 | End: 2018-04-19 | Stop reason: HOSPADM

## 2018-04-19 RX ORDER — PHENYLEPHRINE HCL IN 0.9% NACL 1 MG/10 ML
SYRINGE (ML) INTRAVENOUS PRN
Status: DISCONTINUED | OUTPATIENT
Start: 2018-04-19 | End: 2018-04-19

## 2018-04-19 RX ORDER — MEPERIDINE HYDROCHLORIDE 25 MG/ML
12.5 INJECTION INTRAMUSCULAR; INTRAVENOUS; SUBCUTANEOUS
Status: DISCONTINUED | OUTPATIENT
Start: 2018-04-19 | End: 2018-04-19 | Stop reason: HOSPADM

## 2018-04-19 RX ORDER — PROPOFOL 10 MG/ML
INJECTION, EMULSION INTRAVENOUS PRN
Status: DISCONTINUED | OUTPATIENT
Start: 2018-04-19 | End: 2018-04-19

## 2018-04-19 RX ORDER — OXYCODONE HYDROCHLORIDE 5 MG/1
5-10 TABLET ORAL
Qty: 40 TABLET | Refills: 0 | Status: SHIPPED | OUTPATIENT
Start: 2018-04-19 | End: 2018-05-02

## 2018-04-19 RX ORDER — DIAZEPAM 5 MG
5 TABLET ORAL
Qty: 20 TABLET | Refills: 0 | Status: SHIPPED | OUTPATIENT
Start: 2018-04-19 | End: 2018-10-04

## 2018-04-19 RX ORDER — ONDANSETRON 2 MG/ML
4 INJECTION INTRAMUSCULAR; INTRAVENOUS EVERY 30 MIN PRN
Status: DISCONTINUED | OUTPATIENT
Start: 2018-04-19 | End: 2018-04-19 | Stop reason: HOSPADM

## 2018-04-19 RX ORDER — FENTANYL CITRATE 50 UG/ML
INJECTION, SOLUTION INTRAMUSCULAR; INTRAVENOUS
Status: DISCONTINUED
Start: 2018-04-19 | End: 2018-04-19 | Stop reason: HOSPADM

## 2018-04-19 RX ORDER — OXYCODONE HYDROCHLORIDE 5 MG/1
5 TABLET ORAL
Status: COMPLETED | OUTPATIENT
Start: 2018-04-19 | End: 2018-04-19

## 2018-04-19 RX ORDER — KETOROLAC TROMETHAMINE 30 MG/ML
INJECTION, SOLUTION INTRAMUSCULAR; INTRAVENOUS PRN
Status: DISCONTINUED | OUTPATIENT
Start: 2018-04-19 | End: 2018-04-19

## 2018-04-19 RX ORDER — ONDANSETRON 4 MG/1
4 TABLET, ORALLY DISINTEGRATING ORAL EVERY 30 MIN PRN
Status: DISCONTINUED | OUTPATIENT
Start: 2018-04-19 | End: 2018-04-19 | Stop reason: HOSPADM

## 2018-04-19 RX ORDER — ONDANSETRON 4 MG/1
4 TABLET, ORALLY DISINTEGRATING ORAL
Status: DISCONTINUED | OUTPATIENT
Start: 2018-04-19 | End: 2018-04-19 | Stop reason: HOSPADM

## 2018-04-19 RX ORDER — ONDANSETRON 2 MG/ML
INJECTION INTRAMUSCULAR; INTRAVENOUS PRN
Status: DISCONTINUED | OUTPATIENT
Start: 2018-04-19 | End: 2018-04-19

## 2018-04-19 RX ORDER — IBUPROFEN 800 MG/1
800 TABLET, FILM COATED ORAL 3 TIMES DAILY
Qty: 42 TABLET | Refills: 0 | Status: SHIPPED | OUTPATIENT
Start: 2018-04-19 | End: 2018-05-02

## 2018-04-19 RX ORDER — FENTANYL CITRATE 50 UG/ML
25-50 INJECTION, SOLUTION INTRAMUSCULAR; INTRAVENOUS
Status: DISCONTINUED | OUTPATIENT
Start: 2018-04-19 | End: 2018-04-19 | Stop reason: HOSPADM

## 2018-04-19 RX ORDER — BUPIVACAINE HYDROCHLORIDE 2.5 MG/ML
INJECTION, SOLUTION INFILTRATION; PERINEURAL PRN
Status: DISCONTINUED | OUTPATIENT
Start: 2018-04-19 | End: 2018-04-19 | Stop reason: HOSPADM

## 2018-04-19 RX ORDER — LIDOCAINE HYDROCHLORIDE 20 MG/ML
INJECTION, SOLUTION INFILTRATION; PERINEURAL PRN
Status: DISCONTINUED | OUTPATIENT
Start: 2018-04-19 | End: 2018-04-19

## 2018-04-19 RX ADMIN — FENTANYL CITRATE 25 MCG: 50 INJECTION INTRAMUSCULAR; INTRAVENOUS at 15:07

## 2018-04-19 RX ADMIN — Medication 50 MCG: at 13:35

## 2018-04-19 RX ADMIN — FENTANYL CITRATE 25 MCG: 50 INJECTION INTRAMUSCULAR; INTRAVENOUS at 15:04

## 2018-04-19 RX ADMIN — PROPOFOL 100 MG: 10 INJECTION, EMULSION INTRAVENOUS at 13:21

## 2018-04-19 RX ADMIN — FENTANYL CITRATE 50 MCG: 50 INJECTION, SOLUTION INTRAMUSCULAR; INTRAVENOUS at 13:16

## 2018-04-19 RX ADMIN — ONDANSETRON 4 MG: 2 INJECTION INTRAMUSCULAR; INTRAVENOUS at 13:23

## 2018-04-19 RX ADMIN — MIDAZOLAM 2 MG: 1 INJECTION INTRAMUSCULAR; INTRAVENOUS at 13:06

## 2018-04-19 RX ADMIN — LIDOCAINE HYDROCHLORIDE 40 MG: 20 INJECTION, SOLUTION INFILTRATION; PERINEURAL at 13:13

## 2018-04-19 RX ADMIN — SODIUM CHLORIDE 1 G: 9 INJECTION, SOLUTION INTRAVENOUS at 13:06

## 2018-04-19 RX ADMIN — FENTANYL CITRATE 50 MCG: 50 INJECTION INTRAMUSCULAR; INTRAVENOUS at 14:46

## 2018-04-19 RX ADMIN — Medication 100 MG: at 13:13

## 2018-04-19 RX ADMIN — FENTANYL CITRATE 50 MCG: 50 INJECTION, SOLUTION INTRAMUSCULAR; INTRAVENOUS at 13:13

## 2018-04-19 RX ADMIN — SODIUM CHLORIDE, POTASSIUM CHLORIDE, SODIUM LACTATE AND CALCIUM CHLORIDE: 600; 310; 30; 20 INJECTION, SOLUTION INTRAVENOUS at 12:31

## 2018-04-19 RX ADMIN — FENTANYL CITRATE 50 MCG: 50 INJECTION, SOLUTION INTRAMUSCULAR; INTRAVENOUS at 13:51

## 2018-04-19 RX ADMIN — OXYCODONE HYDROCHLORIDE 5 MG: 5 TABLET ORAL at 15:23

## 2018-04-19 RX ADMIN — FENTANYL CITRATE 50 MCG: 50 INJECTION INTRAMUSCULAR; INTRAVENOUS at 14:58

## 2018-04-19 RX ADMIN — DEXAMETHASONE SODIUM PHOSPHATE 6 MG: 10 INJECTION, SOLUTION INTRAMUSCULAR; INTRAVENOUS at 13:22

## 2018-04-19 RX ADMIN — KETOROLAC TROMETHAMINE 30 MG: 30 INJECTION, SOLUTION INTRAMUSCULAR at 13:24

## 2018-04-19 RX ADMIN — FENTANYL CITRATE 50 MCG: 50 INJECTION INTRAMUSCULAR; INTRAVENOUS at 14:40

## 2018-04-19 RX ADMIN — SODIUM CHLORIDE, POTASSIUM CHLORIDE, SODIUM LACTATE AND CALCIUM CHLORIDE: 600; 310; 30; 20 INJECTION, SOLUTION INTRAVENOUS at 13:42

## 2018-04-19 RX ADMIN — PROPOFOL 200 MG: 10 INJECTION, EMULSION INTRAVENOUS at 13:13

## 2018-04-19 ASSESSMENT — LIFESTYLE VARIABLES: TOBACCO_USE: 1

## 2018-04-19 NOTE — ANESTHESIA POSTPROCEDURE EVALUATION
Patient: Brissa Santana    Procedure(s):  DIAGNOSTIC COLONOSCOPY TOTAL HEMORRHOIDECTOMY - Wound Class: II-Clean Contaminated   - Wound Class: I-Clean    Diagnosis:HEMORRHOIDS  Diagnosis Additional Information: No value filed.    Anesthesia Type:  MAC    Note:  Anesthesia Post Evaluation    Patient location during evaluation: Bedside  Patient participation: Able to fully participate in evaluation  Level of consciousness: awake  Pain management: adequate  Airway patency: patent  Cardiovascular status: acceptable  Respiratory status: acceptable  Hydration status: acceptable  PONV: none     Anesthetic complications: None          Last vitals:  Vitals:    04/19/18 1515 04/19/18 1530 04/19/18 1545   BP: 136/89 129/93 135/90   Resp: 13     Temp:      SpO2:  96% 97%         Electronically Signed By: CATHY Aguayo CRNA  April 19, 2018  4:26 PM

## 2018-04-19 NOTE — ANESTHESIA CARE TRANSFER NOTE
Patient: Brissa Santana    Procedure(s):  DIAGNOSTIC COLONOSCOPY TOTAL HEMORRHOIDECTOMY - Wound Class: II-Clean Contaminated   - Wound Class: I-Clean    Diagnosis: HEMORRHOIDS  Diagnosis Additional Information: No value filed.    Anesthesia Type:   MAC     Note:  Airway :Nasal Cannula  Patient transferred to:PACU  Handoff Report: Identifed the Patient, Identified the Reponsible Provider, Reviewed the pertinent medical history, Discussed the surgical course, Reviewed Intra-OP anesthesia mangement and issues during anesthesia, Set expectations for post-procedure period and Allowed opportunity for questions and acknowledgement of understanding      Vitals: (Last set prior to Anesthesia Care Transfer)    CRNA VITALS  4/19/2018 1351 - 4/19/2018 1431      4/19/2018             Resp Rate (observed): (!)  4    Resp Rate (set): 8                Electronically Signed By: CATHY Carrero CRNA  April 19, 2018  2:31 PM

## 2018-04-19 NOTE — IP AVS SNAPSHOT
HI Preop/Phase II    [address] [address]    Phone:  [phone]                                       After Visit Summary   4/19/2018    Brissa Santana    MRN: [id number]           After Visit Summary Signature Page     I have received my discharge instructions, and my questions have been answered. I have discussed any challenges I see with this plan with the nurse or doctor.    ..........................................................................................................................................  Patient/Patient Representative Signature      ..........................................................................................................................................  Patient Representative Print Name and Relationship to Patient    ..................................................               ................................................  Date                                            Time    ..........................................................................................................................................  Reviewed by Signature/Title    ...................................................              ..............................................  Date                                                            Time

## 2018-04-19 NOTE — IP AVS SNAPSHOT
MRN:[id number]                      After Visit Summary   4/19/2018    Brissa Santana    MRN: [id number]           Thank you!     Thank you for choosing [facility] for your care. Our goal is always to provide you with excellent care. Hearing back from our patients is one way we can continue to improve our services. Please take a few minutes to complete the written survey that you may receive in the mail after you visit with us. Thank you!        Patient Information     Date Of Birth          1980        About your hospital stay     You were admitted on:  April 19, 2018 You last received care in the:  [facility] Preop/Phase II    You were discharged on:  April 19, 2018       Who to Call     For medical emergencies, please call 911.  For non-urgent questions about your medical care, please call your primary care provider or clinic, [phone]  For questions related to your surgery, please call your surgery clinic        Attending Provider     Provider Sharif Hardin MD General Surgery       Primary Care Provider Office Phone # Fax #    Abhilash Philip [phone]      After Care Instructions     Diet Instructions       Resume pre-procedure diet            Discharge Instructions       Follow up in one month in clinic            Dressing       Keep dry folded 4x4 gauze or cotton ball on anus and change twice daily, and as needed.            No Alcohol       For 24 hours after procedure and if taking narcotic pain medications or Metronidazole (FLAGYL).            No driving or operating machinery       until the day after procedure, or if taking narcotic pain medications.            No sitting       No sitting for 2 weeks after surgery.            Sitz bath       Start sitz baths the night of surgery and perform 2-3 times per day, and after bowel movements. Sit down in 8-10 inches of warm water (no need to add soap or salts) in the bathtub for 5 minutes at a time. You can  also use a removable showerhead for the same purpose.                  Further instructions from your care team           INSTRUCTIONS AFTER COLONOSCOPY    WHEN YOU ARE BACK HOME:    Plan to rest for an hour or two after you get home.    You may have some cramping or pressure until you pass gas.    You may resume your regular medications.    Eat a small, light meal at first, and then gradually return to normal meal sizes.  If you had a polyp removed:    Slight bleeding may occur.  You may have a slight blood stain on the toilet paper after a bowel movement.    To lessen the chance of bleeding, avoid heavy exercise for ONE WEEK.  This includes heavy lifting, vigorous sport activities, and heavy physical labor.  You may resume your normal sexual activity.      Avoid aspirin or aspirin products if instructed by your doctor.    WHAT TO WATCH FOR:  Problems rarely occur after the exam; however, it is important for you to watch for early signs of possible problems.  If you have     Unusual pain in your abdomen    Nausea and vomiting that persists    Excessive bleeding    Black or bloody bowel movements    Fever or temperature above 100.6 F  Please call your doctor ([facility] [phone]) or go to the nearest hospital emergency room.    Post-Anesthesia Patient Instructions    IMMEDIATELY FOLLOWING SURGERY:  Do not drive or operate machinery for the first twenty four hours after surgery.  Do not make any important decisions for twenty four hours after surgery or while taking narcotic pain medications or sedatives.  If you develop intractable nausea and vomiting or a severe headache please notify your doctor immediately.    FOLLOW-UP:  Please make an appointment with your surgeon as instructed. You do not need to follow up with anesthesia unless specifically instructed to do so.    WOUND CARE INSTRUCTIONS (if applicable):  Keep a dry clean dressing on the anesthesia/puncture wound site if there is drainage.  Once the  wound has quit draining you may leave it open to air.  Generally you should leave the bandage intact for twenty four hours unless there is drainage.  If the epidural site drains for more than 36-48 hours please call the anesthesia department.          QUESTIONS?:  Please feel free to call your physician or the hospital  if you have any questions, and they will be happy to assist you.       Pending Results     No orders found from 4/17/2018 to 4/20/2018.            Admission Information     Date & Time Provider Department Dept. Phone    4/19/2018 Sharif Yeboah MD HI Preop/Phase [phone]      Your Vitals Were     Blood Pressure Temperature Respirations Pulse Oximetry          137/83 98.8  F (37.1  C) (Temporal) 19 99%        MyChart Information     [facility] gives you secure access to your electronic health record. If you see a primary care provider, you can also send messages to your care team and make appointments. If you have questions, please call your primary care clinic.  If you do not have a primary care provider, please call [phone] and they will assist you.        Care EveryWhere ID     This is your Care EveryWhere ID. This could be used by other organizations to access your [facility] medical records  [id number]        Equal Access to Services     LESVIA ESCALERA : Hadii gracie Paniagua, wapoojada uma, qaybta kaalmada fer, ronnell castaneda. So [facility] [phone].    ATENCIÓN: Si habla español, tiene a anderson disposición servicios gratuitos de asistencia lingüística. Ashley al [phone].    We comply with applicable federal civil rights laws and [place] laws. We do not discriminate on the basis of race, color, national origin, age, disability, sex, sexual orientation, or gender identity.               Review of your medicines      START taking        Dose / Directions    diazepam 5 MG tablet   Commonly known as:  VALIUM   Used for:  S/P  hemorrhoidectomy        Dose:  5 mg   Take 1 tablet (5 mg) by mouth nightly as needed for sleep (or severe pain)   Quantity:  20 tablet   Refills:  0       ibuprofen 800 MG tablet   Commonly known as:  ADVIL/MOTRIN   Used for:  S/P hemorrhoidectomy        Dose:  800 mg   Take 1 tablet (800 mg) by mouth 3 times daily for 14 days Alternate with acetaminophen (TYLENOL), IF ordered.   Quantity:  42 tablet   Refills:  0       lidocaine 2 % topical gel   Commonly known as:  XYLOCAINE   Used for:  S/P hemorrhoidectomy        Apply topically every 4 hours as needed for moderate pain (Apply thick layer to anal opening up to 6 times per day.)   Quantity:  30 mL   Refills:  0       oxyCODONE IR 5 MG tablet   Commonly known as:  ROXICODONE   Used for:  S/P hemorrhoidectomy        Dose:  5-10 mg   Take 1-2 tablets (5-10 mg) by mouth every 3 hours as needed for severe pain   Quantity:  40 tablet   Refills:  0       polyethylene glycol 236 g suspension   Commonly known as:  GoLYTELY/NuLYTELY   Used for:  S/P hemorrhoidectomy        Dose:  4 L   Take 4,000 mLs (4 L) by mouth once for 1 dose If have not had bowel movement in 3 days please go to pharmacy to fill, drink to effect   Quantity:  4000 mL   Refills:  0       psyllium 58.6 % Powd   Commonly known as:  METAMUCIL   Used for:  S/P hemorrhoidectomy        Dose:  1 Tablespoonful   Take 18 g (1 Tablespoonful) by mouth 2 times daily Use for constipation. Dilute powder with fluid before taking.  Continue using for 1 month.   Quantity:  1040 g   Refills:  0         CONTINUE these medicines which have NOT CHANGED        Dose / Directions    bisacodyl 5 MG EC tablet   Commonly known as:  DULCOLAX   Used for:  Hemorrhoids, unspecified hemorrhoid type        Dose:  5 mg   Take 1 tablet (5 mg) by mouth See Admin Instructions   Quantity:  8 tablet   Refills:  0       gabapentin 300 MG capsule   Commonly known as:  NEURONTIN   Used for:  Psychophysiological insomnia, FRANCIA (generalized  "anxiety disorder), MDD (major depressive disorder), recurrent, severe, with psychosis (H)        Dose:  300 mg   Take 1 capsule (300 mg) by mouth 3 times daily   Quantity:  90 capsule   Refills:  1       nystatin-triamcinolone cream   Commonly known as:  MYCOLOG II   Used for:  Candidiasis of vulva and vagina        Apply topically 2 times daily   Quantity:  15 g   Refills:  1       polyethylene glycol powder   Commonly known as:  MIRALAX   Used for:  Hemorrhoids, unspecified hemorrhoid type        Mix one bottle (8.3) ounces with 64 ounces of Gatorade at 6 pm the day prior to colonoscopy   Quantity:  1 Bottle   Refills:  0       venlafaxine 150 MG 24 hr capsule   Commonly known as:  EFFEXOR-XR   Used for:  MDD (major depressive disorder), recurrent, severe, with psychosis (H), FRANCIA (generalized anxiety disorder)        TAKE 1 CAPSULE(150 MG) BY MOUTH DAILY   Quantity:  30 capsule   Refills:  1            Where to get your medicines      These medications were sent to [facility] [address] [place] [address], [place] - [address] AT [facility] [address], [facility] [address]     Phone:  [phone]     ibuprofen 800 MG tablet    lidocaine 2 % topical gel         Some of these will need a paper prescription and others can be bought over the counter. Ask your nurse if you have questions.     Bring a paper prescription for each of these medications     diazepam 5 MG tablet    oxyCODONE IR 5 MG tablet    polyethylene glycol 236 g suspension    psyllium 58.6 % Powd                Protect others around you: Learn how to safely use, store and throw away your medicines at www.disposemymeds.org.        Information about your nerve block     Today you received a block to numb the nerves near your surgery site.    This is a block using local anesthetic or \"numbing\" medication injected around the nerves to anesthetize or \"numb\" the area supplied by those nerves. This block is injected into the muscle layer near " your surgical site. The type of anesthesia (Exparel) your anesthesia team used to numb your abdomen may give you relief for up to 72 hours.     Diet: There are no diet restrictions, but you should drink plenty of fluids, unless you are on a fluid-restricted diet.     Activity: If your surgical site is an arm or leg you should be careful with your affected limb, since it is possible to injure your limb without being aware of it due to the numbing. Until full feeling returns, you should guard against bumping or hitting your limb, and avoid extreme hot or cold temperatures on the skin.    Pain Medication: As the block wears off, the feeling will return as a tingling or prickly sensation near your surgical site. You will experience more discomfort from your incisions as the feeling returns. You may want to take a pain pill (a narcotic or Tylenol if this was prescribed by your surgeon) when you start to experience mild pain, before the pain becomes more severe. If your pain medications do not control your pain, you should notify your surgeon. If you are taking narcotics for pain management, do not drink alcohol, drive a car, or perform hazardous activities.  If you have questions or concerns you may call your surgeon at the number provided with your discharge instructions.     Call your surgeon if you experience blurry vision, ringing in the ears or metallic taste in your mouth.         Information about OPIOIDS     PRESCRIPTION OPIOIDS: WHAT YOU NEED TO KNOW   You have a prescription for an opioid (narcotic) pain medicine. Opioids can cause addiction. If you have a history of chemical dependency of any type, you are at a higher risk of becoming addicted to opioids. Only take this medicine after all other options have been tried. Take it for as short a time and as few doses as possible.     Do not:    Drive. If you drive while taking these medicines, you could be arrested for driving under the influence (DUI).    Operate  heavy machinery    Do any other dangerous activities while taking these medicines.     Drink any alcohol while taking these medicines.      Take with any other medicines that contain acetaminophen. Read all labels carefully. Look for the word  acetaminophen  or  Tylenol.  Ask your pharmacist if you have questions or are unsure.    Store your pills in a secure place, locked if possible. We will not replace any lost or stolen medicine. If you don t finish your medicine, please throw away (dispose) as directed by your pharmacist. The [place] Pollution [employer] has more information about safe disposal: https://www.pca.[facility].[place].us/living-green/managing-unwanted-medications    All opioids tend to cause constipation. Drink plenty of water and eat foods that have a lot of fiber, such as fruits, vegetables, prune juice, apple juice and high-fiber cereal. Take a laxative (Miralax, milk of magnesia, Colace, Senna) if you don t move your bowels at least every other day.              Medication List: This is a list of all your medications and when to take them. Check marks below indicate your daily home schedule. Keep this list as a reference.      Medications           Morning Afternoon Evening Bedtime As Needed    bisacodyl 5 MG EC tablet   Commonly known as:  DULCOLAX   Take 1 tablet (5 mg) by mouth See Admin Instructions                                diazepam 5 MG tablet   Commonly known as:  VALIUM   Take 1 tablet (5 mg) by mouth nightly as needed for sleep (or severe pain)                                gabapentin 300 MG capsule   Commonly known as:  NEURONTIN   Take 1 capsule (300 mg) by mouth 3 times daily                                ibuprofen 800 MG tablet   Commonly known as:  ADVIL/MOTRIN   Take 1 tablet (800 mg) by mouth 3 times daily for 14 days Alternate with acetaminophen (TYLENOL), IF ordered.                                lidocaine 2 % topical gel   Commonly known as:  XYLOCAINE   Apply topically  every 4 hours as needed for moderate pain (Apply thick layer to anal opening up to 6 times per day.)                                nystatin-triamcinolone cream   Commonly known as:  MYCOLOG II   Apply topically 2 times daily                                oxyCODONE IR 5 MG tablet   Commonly known as:  ROXICODONE   Take 1-2 tablets (5-10 mg) by mouth every 3 hours as needed for severe pain   Last time this was given:  5 mg on 4/19/2018  3:23 PM                                polyethylene glycol 236 g suspension   Commonly known as:  GoLYTELY/NuLYTELY   Take 4,000 mLs (4 L) by mouth once for 1 dose If have not had bowel movement in 3 days please go to pharmacy to fill, drink to effect                                polyethylene glycol powder   Commonly known as:  MIRALAX   Mix one bottle (8.3) ounces with 64 ounces of Gatorade at 6 pm the day prior to colonoscopy                                psyllium 58.6 % Powd   Commonly known as:  METAMUCIL   Take 18 g (1 Tablespoonful) by mouth 2 times daily Use for constipation. Dilute powder with fluid before taking.  Continue using for 1 month.                                venlafaxine 150 MG 24 hr capsule   Commonly known as:  EFFEXOR-XR   TAKE 1 CAPSULE(150 MG) BY MOUTH DAILY                                          More Information        Discharge Instructions for Hemorrhoid Surgery  You had surgery to remove hemorrhoids. These are large, swollen veins inside and outside the anus. Hemorrhoids are caused by too much pressure on the anus. This is often due to straining during bowel movements or pressure during pregnancy. After surgery, it may take a few weeks or longer to recover. This sheet tells you how to care for yourself once you re home.   Home care  You may have some bleeding, discharge, or itching for a short time after surgery. This is common. Once at home, be sure to:    Take prescribed pain medicines on time as directed. Don t skip doses or wait until pain gets  bad, as it may be harder to control.    Take sitz baths. Fill a tub with 3 inches of warm water. Sit in the basin or tub for 10 to 20 minutes a few times a day and after each bowel movement.    Avoid straining to pass stool. This can increase pressure on the anus. It can also lead to swelling.    Avoid constipation:  ? Use a laxative or stool softener as advised.  ? Eat more high-fiber foods. These include whole grains, fruit, and veggies.  ? Drink plenty of fluids.    Avoid heavy lifting and strenuous activity for 1 to 2 weeks.    Use suppositories and pads, if needed. These can help relieve symptoms.    Avoid driving until you re able to sit and move without pain. Ask someone to drive you to appointments, if needed.    Practice good bowel habits. Don t ignore the urge to go. But avoid spending too much time on the toilet.  Follow-up  You ll have a follow-up visit with the healthcare provider. During this visit, the healthcare provider will check how well you re healing. This visit will likely happen within 1 to 2 weeks.  When to call your healthcare provider  Call your healthcare provider right away if you have any of the following:    Fever of 100.4 F (38.0 C) or higher, or as directed by your healthcare provider    A large amount of drainage or bleeding from the rectum    Trouble urinating    No bowel movement for more than 48 hours   Date Last Reviewed: 7/1/2016 2000-2017 [employer]. [address] [place] [address], [place] [address]. All rights reserved. This information is not intended as a substitute for professional medical care. Always follow your healthcare professional's instructions.

## 2018-04-19 NOTE — DISCHARGE INSTRUCTIONS
INSTRUCTIONS AFTER COLONOSCOPY    WHEN YOU ARE BACK HOME:    Plan to rest for an hour or two after you get home.    You may have some cramping or pressure until you pass gas.    You may resume your regular medications.    Eat a small, light meal at first, and then gradually return to normal meal sizes.  If you had a polyp removed:    Slight bleeding may occur.  You may have a slight blood stain on the toilet paper after a bowel movement.    To lessen the chance of bleeding, avoid heavy exercise for ONE WEEK.  This includes heavy lifting, vigorous sport activities, and heavy physical labor.  You may resume your normal sexual activity.      Avoid aspirin or aspirin products if instructed by your doctor.    WHAT TO WATCH FOR:  Problems rarely occur after the exam; however, it is important for you to watch for early signs of possible problems.  If you have     Unusual pain in your abdomen    Nausea and vomiting that persists    Excessive bleeding    Black or bloody bowel movements    Fever or temperature above 100.6 F  Please call your doctor ([facility] [phone]) or go to the nearest hospital emergency room.    Post-Anesthesia Patient Instructions    IMMEDIATELY FOLLOWING SURGERY:  Do not drive or operate machinery for the first twenty four hours after surgery.  Do not make any important decisions for twenty four hours after surgery or while taking narcotic pain medications or sedatives.  If you develop intractable nausea and vomiting or a severe headache please notify your doctor immediately.    FOLLOW-UP:  Please make an appointment with your surgeon as instructed. You do not need to follow up with anesthesia unless specifically instructed to do so.    WOUND CARE INSTRUCTIONS (if applicable):  Keep a dry clean dressing on the anesthesia/puncture wound site if there is drainage.  Once the wound has quit draining you may leave it open to air.  Generally you should leave the bandage intact for twenty four  hours unless there is drainage.  If the epidural site drains for more than 36-48 hours please call the anesthesia department.          QUESTIONS?:  Please feel free to call your physician or the hospital  if you have any questions, and they will be happy to assist you.

## 2018-04-19 NOTE — OR NURSING
Patient and responsible adult given discharge instructions with no questions regarding instructions. Tasneem score 19. Pain level 2/10.  Discharged from unit via ambualted. Patient discharged to home. 4X4's given to patient for home

## 2018-04-19 NOTE — ANESTHESIA PREPROCEDURE EVALUATION
Anesthesia Evaluation     . Pt has had prior anesthetic.     No history of anesthetic complications          ROS/MED HX    ENT/Pulmonary:     (+)tobacco use, Current use 0.25 packs/day  , . .    Neurologic:  - neg neurologic ROS     Cardiovascular:  - neg cardiovascular ROS       METS/Exercise Tolerance:     Hematologic:  - neg hematologic  ROS       Musculoskeletal:   (+) , , other musculoskeletal- lumbago, neck pain, h/o spinal surgery      GI/Hepatic:  - neg GI/hepatic ROS       Renal/Genitourinary:  - ROS Renal section negative       Endo:  - neg endo ROS       Psychiatric:     (+) psychiatric history depression and anxiety      Infectious Disease:  - neg infectious disease ROS       Malignancy:      - no malignancy   Other: Comment: Tramadol dependency and abuse, drug overdose  Heroin use                    Physical Exam  Normal systems: cardiovascular, pulmonary and dental    Airway   Mallampati: II  TM distance: >3 FB  Neck ROM: full    Dental     Cardiovascular   Rhythm and rate: regular and normal      Pulmonary    breath sounds clear to auscultation                    Anesthesia Plan      History & Physical Review  History and physical reviewed and following examination; no interval change.    ASA Status:  3 .    NPO Status:  > 8 hours    Plan for General and ETT with Intravenous and Propofol induction. Maintenance will be Inhalation.    PONV prophylaxis:  Ondansetron (or other 5HT-3) and Dexamethasone or Solumedrol  Discussed risks and benefits with patient for general anesthesia including sore throat, nausea, vomiting, aspiration, dental damage, loss of airway, CV complications, stroke, MI, death. Pt wishes to proceed.       Postoperative Care  Postoperative pain management:  IV analgesics.      Consents  Anesthetic plan, risks, benefits and alternatives discussed with:  Patient.  Use of blood products discussed: Yes.   Use of blood products discussed with Patient.  Consented to blood products.  .                           .

## 2018-04-20 NOTE — OP NOTE
Brissa Santana MRN# [id number]   YOB: 1980 Age: 37 year old      Date of Admission:  4/19/2018  Date of Service:   4/19/18    Primary care provider: Abhilash Philip    PREOPERATIVE DIAGNOSIS:  Diagnostic colonoscopy, symptomatic hemorrhoids         POSTOPERATIVE DIAGNOSIS:  Same          PROCEDURE:   1. Colonoscopy                               2. Exam under anesthesia                               3. 3 quadrant hemorrhoidectomy            INDICATIONS:  See clinic note for more details.      Specimen:   ID Type Source Tests Collected by Time Destination   A :  Tissue Hemorrhoid SURGICAL PATHOLOGY EXAM Sharif Yeboah MD 4/19/2018  2:06 PM        SURGEON: Sharif Yeboah    DESCRIPTION OF PROCEDURE: Brissa Santana was brought into the endoscopy suite and placed in the left lateral decubitus position. After preprocedural pause and attended general anesthesia was administered, the external anus was inspected and showed large posterior right perianal skin tag. Digital rectal exam was normal. The colonoscope was inserted and advanced under direct visualization to the level of the cecum which was identified by the appendiceal orifice and the ileocecal valve. The prep was excellent.. Upon slow withdrawal of the colonoscope, approximately 95% of the mucosa was directly visualized. The rest of the colon was without mucosal abnormality. There was no evidence of further polyps, inflammation, bleeding or AVMs. Retroflexion of the rectum was normal. The extra air was removed from the colon, and the colonoscope withdrawn.     The patient was then flipped over prone ensuring that all pressure points were adequately padded. The patient was position in the prone jackknife. The buttocks was tapped and the perianal region was prepped and draped in a sterile fashion. A time out was preformed.      A digital exam was preformed as well as a speculum exam with a arnold bivalve.  There was also noted to be three columns  of hemorrhoidal tissue. Taking electrocautery the external hemorrhoidal tissue as well as skin tag was incised and a plane was developed along the sphincter muscles then taking a handheld ligasure the internal hemorrhoidal tissue was removed. This was repeated for two other columns. Only one of the columns was oversewn with 3-0 chromic in a running locking fashion. The suture lines were observed for hemostasis and found to be adequate. Then experel was injected in three levels in 6 locations. The patient was awoken, tolerated the procedure.       Sharif Yeboah

## 2018-04-23 ENCOUNTER — TELEPHONE (OUTPATIENT)
Dept: SURGERY | Facility: OTHER | Age: 38
End: 2018-04-23

## 2018-04-23 DIAGNOSIS — Z98.890 S/P HEMORRHOIDECTOMY: Primary | ICD-10-CM

## 2018-04-23 DIAGNOSIS — Z87.19 S/P HEMORRHOIDECTOMY: Primary | ICD-10-CM

## 2018-04-23 LAB — COPATH REPORT: NORMAL

## 2018-04-23 RX ORDER — OXYCODONE AND ACETAMINOPHEN 5; 325 MG/1; MG/1
1 TABLET ORAL EVERY 6 HOURS PRN
Qty: 30 TABLET | Refills: 0 | Status: SHIPPED | OUTPATIENT
Start: 2018-04-23 | End: 2018-05-02

## 2018-04-24 DIAGNOSIS — Z98.890 S/P HEMORRHOIDECTOMY: ICD-10-CM

## 2018-04-24 DIAGNOSIS — Z87.19 S/P HEMORRHOIDECTOMY: ICD-10-CM

## 2018-04-24 RX ORDER — OXYCODONE HYDROCHLORIDE 5 MG/1
5-10 TABLET ORAL
Qty: 40 TABLET | Refills: 0 | OUTPATIENT
Start: 2018-04-24

## 2018-04-24 NOTE — TELEPHONE ENCOUNTER
Had come  another script today for 30,  This will be her last script, do not give patient any more pain medication, have concerns about intent

## 2018-04-24 NOTE — TELEPHONE ENCOUNTER
oxycodone      Last Written Prescription Date:  4/19/18  Last Fill Quantity: 40,   # refills: 0  Last Office Visit: 4/19/18  Future Office visit:       Routing refill request to provider for review/approval because:  Drug not on the [facility], [facility] or [facility] refill protocol or controlled substance

## 2018-04-30 ENCOUNTER — TELEPHONE (OUTPATIENT)
Dept: SURGERY | Facility: OTHER | Age: 38
End: 2018-04-30

## 2018-04-30 NOTE — TELEPHONE ENCOUNTER
Pt called and states that she is having a lot of pain.  She states that she feels like she has another hemorrhoid.  Pt is s/p colonoscopy and and hemorrhoidectomy on 4/19/18.  I did let pt know that she should go into the UC to be seen if she is having a lot of pain.  She states that if she does not go to the [facility] today, she will call the surgery nurses tomorrow morning and see if she can be seen.

## 2018-05-02 ENCOUNTER — HOSPITAL ENCOUNTER (EMERGENCY)
Facility: HOSPITAL | Age: 38
Discharge: HOME | End: 2018-05-02
Attending: NURSE PRACTITIONER | Admitting: NURSE PRACTITIONER
Payer: COMMERCIAL

## 2018-05-02 VITALS
SYSTOLIC BLOOD PRESSURE: 154 MMHG | RESPIRATION RATE: 16 BRPM | TEMPERATURE: 97.1 F | OXYGEN SATURATION: 96 % | DIASTOLIC BLOOD PRESSURE: 80 MMHG

## 2018-05-02 DIAGNOSIS — F33.3 MDD (MAJOR DEPRESSIVE DISORDER), RECURRENT, SEVERE, WITH PSYCHOSIS (H): ICD-10-CM

## 2018-05-02 DIAGNOSIS — Z98.890 S/P HEMORRHOIDECTOMY: ICD-10-CM

## 2018-05-02 DIAGNOSIS — Z87.19 S/P HEMORRHOIDECTOMY: ICD-10-CM

## 2018-05-02 DIAGNOSIS — K62.89 RECTAL PAIN: ICD-10-CM

## 2018-05-02 DIAGNOSIS — F41.1 GAD (GENERALIZED ANXIETY DISORDER): ICD-10-CM

## 2018-05-02 LAB
BASOPHILS # BLD AUTO: 0 10E9/L (ref 0–0.2)
BASOPHILS NFR BLD AUTO: 0.6 %
DIFFERENTIAL METHOD BLD: NORMAL
EOSINOPHIL # BLD AUTO: 0.2 10E9/L (ref 0–0.7)
EOSINOPHIL NFR BLD AUTO: 2.7 %
ERYTHROCYTE [DISTWIDTH] IN BLOOD BY AUTOMATED COUNT: 12.3 % (ref 10–15)
HCT VFR BLD AUTO: 39 % (ref 35–47)
HGB BLD-MCNC: 13.5 G/DL (ref 11.7–15.7)
IMM GRANULOCYTES # BLD: 0 10E9/L (ref 0–0.4)
IMM GRANULOCYTES NFR BLD: 0.3 %
LYMPHOCYTES # BLD AUTO: 1.9 10E9/L (ref 0.8–5.3)
LYMPHOCYTES NFR BLD AUTO: 27.5 %
MCH RBC QN AUTO: 32.8 PG (ref 26.5–33)
MCHC RBC AUTO-ENTMCNC: 34.6 G/DL (ref 31.5–36.5)
MCV RBC AUTO: 95 FL (ref 78–100)
MONOCYTES # BLD AUTO: 0.6 10E9/L (ref 0–1.3)
MONOCYTES NFR BLD AUTO: 8.5 %
NEUTROPHILS # BLD AUTO: 4.2 10E9/L (ref 1.6–8.3)
NEUTROPHILS NFR BLD AUTO: 60.4 %
NRBC # BLD AUTO: 0 10*3/UL
NRBC BLD AUTO-RTO: 0 /100
PLATELET # BLD AUTO: 433 10E9/L (ref 150–450)
RBC # BLD AUTO: 4.12 10E12/L (ref 3.8–5.2)
WBC # BLD AUTO: 6.9 10E9/L (ref 4–11)

## 2018-05-02 PROCEDURE — 85025 COMPLETE CBC W/AUTO DIFF WBC: CPT | Performed by: NURSE PRACTITIONER

## 2018-05-02 PROCEDURE — 99024 POSTOP FOLLOW-UP VISIT: CPT | Performed by: NURSE PRACTITIONER

## 2018-05-02 PROCEDURE — G0463 HOSPITAL OUTPT CLINIC VISIT: HCPCS

## 2018-05-02 PROCEDURE — 25000128 H RX IP 250 OP 636: Performed by: NURSE PRACTITIONER

## 2018-05-02 PROCEDURE — 25000125 ZZHC RX 250: Performed by: NURSE PRACTITIONER

## 2018-05-02 PROCEDURE — 36415 COLL VENOUS BLD VENIPUNCTURE: CPT | Performed by: NURSE PRACTITIONER

## 2018-05-02 RX ORDER — KETOROLAC TROMETHAMINE 30 MG/ML
60 INJECTION, SOLUTION INTRAMUSCULAR; INTRAVENOUS ONCE
Status: COMPLETED | OUTPATIENT
Start: 2018-05-02 | End: 2018-05-02

## 2018-05-02 RX ORDER — LIDOCAINE 40 MG/G
CREAM TOPICAL ONCE
Status: COMPLETED | OUTPATIENT
Start: 2018-05-02 | End: 2018-05-02

## 2018-05-02 RX ADMIN — LIDOCAINE: 40 CREAM TOPICAL at 17:51

## 2018-05-02 RX ADMIN — KETOROLAC TROMETHAMINE 60 MG: 30 INJECTION, SOLUTION INTRAMUSCULAR at 18:34

## 2018-05-02 NOTE — ED AVS SNAPSHOT
[facility]    [address] [address]    Phone:  [phone]                                       Brissa Santana   MRN: [id number]    Department:  [facility]   Date of Visit:  5/2/2018           After Visit Summary Signature Page     I have received my discharge instructions, and my questions have been answered. I have discussed any challenges I see with this plan with the nurse or doctor.    ..........................................................................................................................................  Patient/Patient Representative Signature      ..........................................................................................................................................  Patient Representative Print Name and Relationship to Patient    ..................................................               ................................................  Date                                            Time    ..........................................................................................................................................  Reviewed by Signature/Title    ...................................................              ..............................................  Date                                                            Time

## 2018-05-02 NOTE — ED PROVIDER NOTES
"  History     Chief Complaint   Patient presents with     Hemorrhoids     c/o had hemorrhoid surg on 4/19 and notes looks like they came back and notes pain, ran out of her pain meds a couple of days ago     The history is provided by the patient. No  was used.     Brissa Santana is a 37 year old female who presents today with a CC of rectal pain.  She had a hemorrhoidectomy on 4/19.  She reports pain has been consistent since after surgery.  She ran out of pain medication today.  She has been using ibuprofen 800 mg for pain with some improvement.  She notes that the area \"looks and feels different\" so she is concerned.   She is concerned that there are additional hemorrhoids forming.  She reports danuta consistency stool, with occasional dark colored stool.  There is occasional blood clots.  She is using metamucil.  She has a follow up appointment with Dr Yeboah on 5/16.      Problem List:    Patient Active Problem List    Diagnosis Date Noted     NO SHOW 07/20/2017     Priority: Medium     No showed Dr. Jeong 7/20/17       ACP (advance care planning) 07/14/2016     Priority: Medium     Advance Care Planning 7/14/2016: ACP Review of Chart / Resources Provided:  Reviewed chart for advance care plan.  Brissa Santana has no plan or code status on file. Discussed available resources and provided with information.   Added by Corbin Valencia             Chemical dependency (H) 07/14/2016     Priority: Medium     MDD (major depressive disorder), recurrent, severe, with psychosis (H)      Priority: Medium     FRANCIA (generalized anxiety disorder)      Priority: Medium     Drug overdose 02/19/2016     Priority: Medium     Inflammation of sacroiliac joint (H) 10/16/2015     Priority: Medium     Insomnia 11/21/2013     Priority: Medium     Neck pain 05/20/2013     Priority: Medium     History of reduction mammoplasty 05/20/2013     Priority: Medium     History of spinal surgery 05/20/2013     Priority: Medium "     Tramadol dependency and abuse 2011     Priority: Medium        Past Medical History:    Past Medical History:   Diagnosis Date     Backache, unspecified 2007     Chemical dependency (H) 2016     FRANCIA (generalized anxiety disorder)      Heroin use 2013     Lumbago 2003     MDD (major depressive disorder), recurrent, severe, with psychosis (H)      Polypharmacy -h/o tramadol,ambien,narcotic depend. 2013     Tramadol dependency and abuse 2011       Past Surgical History:    Past Surgical History:   Procedure Laterality Date     APPENDECTOMY       BACK SURGERY      lumbar diskectomy with fusion      SECTION      x2     COLONOSCOPY N/A 2018    Procedure: COLONOSCOPY;  DIAGNOSTIC COLONOSCOPY TOTAL HEMORRHOIDECTOMY;  Surgeon: Sharif Yeboah MD;  Location: [facility] OR     D & C       HEMORRHOIDECTOMY INTERNAL N/A 2018    Procedure: HEMORRHOIDECTOMY INTERNAL;;  Surgeon: Sharif Yeboah MD;  Location: [facility]     OTHER SURGICAL HISTORY      Breast Reduction     TONSILLECTOMY       TUBAL LIGATION         Family History:    Family History   Problem Relation Age of Onset     Breast Cancer Maternal Grandmother      CANCER Paternal Grandmother      Hepatitis Paternal Uncle      Hep C     Hepatitis Father      Hep C       Social History:  Marital Status:  Single [1]  Social History   Substance Use Topics     Smoking status: Former Smoker     Packs/day: 1.00     Years: 15.00     Types: Cigarettes     Quit date: 3/11/2017     Smokeless tobacco: Never Used      Comment: Tried to quit (yes); longest period tobacco-free- 9 months     Alcohol use No        Medications:      diazepam (VALIUM) 5 MG tablet   lidocaine (XYLOCAINE) 2 % topical gel   psyllium (METAMUCIL) 58.6 % POWD   venlafaxine (EFFEXOR-XR) 150 MG 24 hr capsule   bisacodyl (DULCOLAX) 5 MG EC tablet   ibuprofen (ADVIL/MOTRIN) 800 MG tablet   nystatin-triamcinolone (MYCOLOG II) cream   polyethylene glycol (MIRALAX)  powder         Review of Systems   Constitutional: Negative for appetite change, chills, fatigue and fever.   Gastrointestinal: Positive for blood in stool and rectal pain.       Physical Exam   BP: 154/80  Heart Rate: 84  Temp: 97.1  F (36.2  C)  Resp: 16  SpO2: 96 %      Physical Exam   Constitutional: She is oriented to person, place, and time. She appears well-developed and well-nourished. She is cooperative.   Cardiovascular: Normal rate.    Pulmonary/Chest: Effort normal.   Abdominal: Soft. Bowel sounds are normal. She exhibits no distension. There is no tenderness. There is no rebound and no guarding.   Genitourinary: Rectal exam shows external hemorrhoid and tenderness.   Genitourinary Comments: Rectum is hypersensitive to touch, she will not allow even the slightest palpation, it appears as some external hemorrhoids have formed, no evidence of thrombosed hemorrhoid, no active bleeding, drainage, erythema.     Neurological: She is alert and oriented to person, place, and time.   Nursing note and vitals reviewed.      ED Course     ED Course     Procedures    Results for orders placed or performed during the hospital encounter of 05/02/18   CBC with platelets differential   Result Value Ref Range    WBC 6.9 4.0 - 11.0 10e9/L    RBC Count 4.12 3.8 - 5.2 10e12/L    Hemoglobin 13.5 11.7 - 15.7 g/dL    Hematocrit 39.0 35.0 - 47.0 %    MCV 95 78 - 100 fl    MCH 32.8 26.5 - 33.0 pg    MCHC 34.6 31.5 - 36.5 g/dL    RDW 12.3 10.0 - 15.0 %    Platelet Count 433 150 - 450 10e9/L    Diff Method Automated Method     % Neutrophils 60.4 %    % Lymphocytes 27.5 %    % Monocytes 8.5 %    % Eosinophils 2.7 %    % Basophils 0.6 %    % Immature Granulocytes 0.3 %    Nucleated RBCs 0 0 /100    Absolute Neutrophil 4.2 1.6 - 8.3 10e9/L    Absolute Lymphocytes 1.9 0.8 - 5.3 10e9/L    Absolute Monocytes 0.6 0.0 - 1.3 10e9/L    Absolute Eosinophils 0.2 0.0 - 0.7 10e9/L    Absolute Basophils 0.0 0.0 - 0.2 10e9/L    Abs Immature  Granulocytes 0.0 0 - 0.4 10e9/L    Absolute Nucleated RBC 0.0        Assessments & Plan (with Medical Decision Making)   Discussed with Dr Phuong Maxwell ED physician, she feels it would be normal to have occasional black or bloody stools after hemorrhoid surgery.  Check CBC to assure that hemoglobin is WNL and have patient follow up with Dr Yeboah in clinic.      I have reviewed the nursing notes.    I have reviewed the findings, diagnosis, plan and need for follow up with the patient.  ASSESSMENT / PLAN:  (K62.89) Rectal pain  Comment: note in chart from Dr Yeboah requesting no more pain medication refills for patient  Plan:  Ibuprofen, sits baths, rest   Call tomorrow and scheduled appointment for follow up with Dr Yeboah    (Z98.890,  Z87.19) S/P hemorrhoidectomy      Discharge Medication List as of 5/2/2018  6:34 PM          Final diagnoses:   Rectal pain   S/P hemorrhoidectomy       5/2/2018   [facility]     Miracle Macias NP  05/03/18 1123

## 2018-05-02 NOTE — ED AVS SNAPSHOT
[facility]    [address] [address]    Phone:  [phone]                                       Brissa Santana   MRN: [id number]    Department:  [facility]   Date of Visit:  5/2/2018           Patient Information     Date Of Birth          1980        Your diagnoses for this visit were:     Rectal pain     S/P hemorrhoidectomy        You were seen by Miracle Macias NP.      Follow-up Information     Follow up with [facility].    Specialty:  EMERGENCY MEDICINE    Why:  As needed, If symptoms worsen, or concerns develop    Contact information:    [address] [address] [phone]    Additional information:    From [place] Area: Take [address]. Turn left at [address]. Turn left at the first stoplight on [address]. At the first stop sign, take a right onto [address]. Take a left into the parking lot and continue through until you reach the North enterance of the building.       From [place]: Take [address]. Take the [address] ramp towards [place]. Turn left onto [address]. Take a slight right onto [address]. Turn left at the first stoplight on [address]. At the first stop sign, take a right onto [address]. Take a left into the parking lot and continue through until you reach the North enterance of the building.       From [place]: Take [address]. Take a right at [address]. At the first stop sign, take a right onto [address]. Take a left into the parking lot and continue through until you reach the North enterance of the building.         Follow up with Sharif Yeboah MD. Go on 5/3/2018.    Specialty:  General Surgery    Why:  for recheck    Contact information:    [address] [facility] [address]  [phone]          Follow up with Zack Colin MD.    Specialty:  Surgery    Why:  If you cannot get  in to see Dr Yeboah, he is in clinic in am    Contact information:    [address] [place] [address]  [phone]        Discharge References/Attachments     HEMORRHOID SURGERY, DISCHARGE INSTRUCTIONS (ENGLISH)      Your next 10 appointments already scheduled     May 16, 2018  1:30 PM CDT   (Arrive by 1:15 PM)   Post Op with Sharif Yeboah MD   [facility] ([facility] [place] )    [address] [place] [address]   [phone]                 Review of your medicines      Our records show that you are taking the medicines listed below. If these are incorrect, please call your family doctor or clinic.        Dose / Directions Last dose taken    bisacodyl 5 MG EC tablet   Commonly known as:  DULCOLAX   Dose:  5 mg   Quantity:  8 tablet        Take 1 tablet (5 mg) by mouth See Admin Instructions   Refills:  0        diazepam 5 MG tablet   Commonly known as:  VALIUM   Dose:  5 mg   Quantity:  20 tablet        Take 1 tablet (5 mg) by mouth nightly as needed for sleep (or severe pain)   Refills:  0        ibuprofen 800 MG tablet   Commonly known as:  ADVIL/MOTRIN   Dose:  800 mg   Quantity:  42 tablet        Take 1 tablet (800 mg) by mouth 3 times daily for 14 days Alternate with acetaminophen (TYLENOL), IF ordered.   Refills:  0        lidocaine 2 % topical gel   Commonly known as:  XYLOCAINE   Quantity:  30 mL        Apply topically every 4 hours as needed for moderate pain (Apply thick layer to anal opening up to 6 times per day.)   Refills:  0        nystatin-triamcinolone cream   Commonly known as:  MYCOLOG II   Quantity:  15 g        Apply topically 2 times daily   Refills:  1        polyethylene glycol powder   Commonly known as:  MIRALAX   Quantity:  1 Bottle        Mix one bottle (8.3) ounces with 64 ounces of Gatorade at 6 pm the day prior to colonoscopy   Refills:  0        psyllium 58.6 % Powd   Commonly known as:  METAMUCIL   Dose:  1 Tablespoonful   Quantity:  1040 g         Take 18 g (1 Tablespoonful) by mouth 2 times daily Use for constipation. Dilute powder with fluid before taking.  Continue using for 1 month.   Refills:  0        venlafaxine 150 MG 24 hr capsule   Commonly known as:  EFFEXOR-XR   Quantity:  30 capsule        TAKE 1 CAPSULE(150 MG) BY MOUTH DAILY   Refills:  1                Procedures and tests performed during your visit     CBC with platelets differential      Orders Needing Specimen Collection     None      Pending Results     No orders found from 4/30/2018 to 5/3/2018.            Pending Culture Results     No orders found from 4/30/2018 to 5/3/2018.            Thank you for choosing [facility]       Thank you for choosing [facility] for your care. Our goal is always to provide you with excellent care. Hearing back from our patients is one way we can continue to improve our services. Please take a few minutes to complete the written survey that you may receive in the mail after you visit with us. Thank you!        YouMailhart Information     [employer] gives you secure access to your electronic health record. If you see a primary care provider, you can also send messages to your care team and make appointments. If you have questions, please call your primary care clinic.  If you do not have a primary care provider, please call [phone] and they will assist you.        Care EveryWhere ID     This is your Care EveryWhere ID. This could be used by other organizations to access your [facility] medical records  [id number]        Equal Access to Services     LESVIA ESCALERA : Traci Paniagua, wapoojada uma, qaybta kaalzara monroe, ronnell castaneda. So [facility] [phone].    ATENCIÓN: Si habla español, tiene a anderson disposición servicios gratuitos de asistencia lingüística. Llame al [phone].    We comply with applicable federal civil rights laws and [place] laws. We do not discriminate on the basis of race, color, national  origin, age, disability, sex, sexual orientation, or gender identity.            After Visit Summary       This is your record. Keep this with you and show to your community pharmacist(s) and doctor(s) at your next visit.

## 2018-05-02 NOTE — ED TRIAGE NOTES
Pt presents today for c/o recurring hemorrhoid pain and after having had surgery for it less than a month ago.

## 2018-05-03 ASSESSMENT — ENCOUNTER SYMPTOMS
FEVER: 0
CHILLS: 0
FATIGUE: 0
BLOOD IN STOOL: 1
RECTAL PAIN: 1
APPETITE CHANGE: 0

## 2018-05-03 NOTE — TELEPHONE ENCOUNTER
ibuprofen      Last Written Prescription Date:  4/19/18  Last Fill Quantity: 42,   # refills: 0  Last Office Visit: 3/23/18  Future Office visit: none

## 2018-05-04 RX ORDER — VENLAFAXINE HYDROCHLORIDE 150 MG/1
CAPSULE, EXTENDED RELEASE ORAL
Qty: 30 CAPSULE | Refills: 2 | Status: SHIPPED | OUTPATIENT
Start: 2018-05-04 | End: 2018-07-26

## 2018-05-04 RX ORDER — IBUPROFEN 800 MG/1
TABLET, FILM COATED ORAL
Qty: 42 TABLET | Refills: 0 | Status: SHIPPED | OUTPATIENT
Start: 2018-05-04 | End: 2018-07-27

## 2018-05-23 DIAGNOSIS — B96.89 BV (BACTERIAL VAGINOSIS): ICD-10-CM

## 2018-05-23 DIAGNOSIS — N76.0 BV (BACTERIAL VAGINOSIS): ICD-10-CM

## 2018-05-23 NOTE — TELEPHONE ENCOUNTER
flagyl      Last Written Prescription Date:  Not on medication list  Last Fill Quantity: ,   # refills:   Last Office Visit: 3/15/18  Future Office visit:  none

## 2018-05-24 RX ORDER — METRONIDAZOLE 500 MG/1
TABLET ORAL
Qty: 14 TABLET | Refills: 0 | OUTPATIENT
Start: 2018-05-24

## 2018-07-09 ENCOUNTER — HEALTH MAINTENANCE LETTER (OUTPATIENT)
Age: 38
End: 2018-07-09

## 2018-07-26 DIAGNOSIS — F33.3 MDD (MAJOR DEPRESSIVE DISORDER), RECURRENT, SEVERE, WITH PSYCHOSIS (H): ICD-10-CM

## 2018-07-26 DIAGNOSIS — F41.1 GAD (GENERALIZED ANXIETY DISORDER): ICD-10-CM

## 2018-07-27 DIAGNOSIS — Z98.890 S/P HEMORRHOIDECTOMY: ICD-10-CM

## 2018-07-27 DIAGNOSIS — Z87.19 S/P HEMORRHOIDECTOMY: ICD-10-CM

## 2018-07-27 RX ORDER — VENLAFAXINE HYDROCHLORIDE 150 MG/1
CAPSULE, EXTENDED RELEASE ORAL
Qty: 30 CAPSULE | Refills: 1 | Status: SHIPPED | OUTPATIENT
Start: 2018-07-27 | End: 2018-10-01

## 2018-07-27 RX ORDER — IBUPROFEN 800 MG/1
TABLET, FILM COATED ORAL
Qty: 42 TABLET | Refills: 0 | Status: SHIPPED | OUTPATIENT
Start: 2018-07-27 | End: 2019-05-09

## 2018-10-01 DIAGNOSIS — F33.3 MDD (MAJOR DEPRESSIVE DISORDER), RECURRENT, SEVERE, WITH PSYCHOSIS (H): ICD-10-CM

## 2018-10-01 DIAGNOSIS — F41.1 GAD (GENERALIZED ANXIETY DISORDER): ICD-10-CM

## 2018-10-02 NOTE — TELEPHONE ENCOUNTER
venlafaxine (EFFEXOR-XR) 150 MG 24 hr capsule    Last Written Prescription Date:  7/27/18  Last Fill Quantity: 30,   # refills: 1  Last Office Visit: 3/5/18  Future Office visit:    Next 5 appointments (look out 90 days)     Oct 04, 2018  3:30 PM CDT   (Arrive by 3:15 PM)   SHORT with Antelmo Jeong MD   [facility]    [address] [place] [address]   [phone]            Oct 31, 2018  1:45 PM CDT   (Arrive by 1:30 PM)   SHORT with Abhilash Philip MD   [facility] ([facility] )    [address] [place] [address]   [phone]                     
no cough/no wheezing/no pleuritic chest pain

## 2018-10-03 RX ORDER — VENLAFAXINE HYDROCHLORIDE 150 MG/1
CAPSULE, EXTENDED RELEASE ORAL
Qty: 30 CAPSULE | Refills: 0 | Status: SHIPPED | OUTPATIENT
Start: 2018-10-03 | End: 2018-11-12

## 2018-10-04 ENCOUNTER — OFFICE VISIT (OUTPATIENT)
Dept: OBGYN | Facility: OTHER | Age: 38
End: 2018-10-04
Attending: OBSTETRICS & GYNECOLOGY
Payer: COMMERCIAL

## 2018-10-04 VITALS
WEIGHT: 150 LBS | DIASTOLIC BLOOD PRESSURE: 73 MMHG | HEIGHT: 63 IN | HEART RATE: 50 BPM | BODY MASS INDEX: 26.58 KG/M2 | SYSTOLIC BLOOD PRESSURE: 122 MMHG

## 2018-10-04 DIAGNOSIS — N89.8 VAGINAL DISCHARGE: ICD-10-CM

## 2018-10-04 DIAGNOSIS — N76.0 VAGINITIS AND VULVOVAGINITIS: ICD-10-CM

## 2018-10-04 DIAGNOSIS — R87.612 PAPANICOLAOU SMEAR OF CERVIX WITH LOW GRADE SQUAMOUS INTRAEPITHELIAL LESION (LGSIL): Primary | ICD-10-CM

## 2018-10-04 LAB
SPECIMEN SOURCE: ABNORMAL
WET PREP SPEC: ABNORMAL

## 2018-10-04 PROCEDURE — 99000 SPECIMEN HANDLING OFFICE-LAB: CPT | Performed by: OBSTETRICS & GYNECOLOGY

## 2018-10-04 PROCEDURE — 87491 CHLMYD TRACH DNA AMP PROBE: CPT | Mod: ZL | Performed by: OBSTETRICS & GYNECOLOGY

## 2018-10-04 PROCEDURE — 87591 N.GONORRHOEAE DNA AMP PROB: CPT | Mod: ZL | Performed by: OBSTETRICS & GYNECOLOGY

## 2018-10-04 PROCEDURE — 88142 CYTOPATH C/V THIN LAYER: CPT | Mod: ZL | Performed by: OBSTETRICS & GYNECOLOGY

## 2018-10-04 PROCEDURE — G0463 HOSPITAL OUTPT CLINIC VISIT: HCPCS | Mod: 25

## 2018-10-04 PROCEDURE — G0476 HPV COMBO ASSAY CA SCREEN: HCPCS | Mod: ZL | Performed by: OBSTETRICS & GYNECOLOGY

## 2018-10-04 PROCEDURE — 87624 HPV HI-RISK TYP POOLED RSLT: CPT | Mod: ZL | Performed by: OBSTETRICS & GYNECOLOGY

## 2018-10-04 PROCEDURE — 88142 CYTOPATH C/V THIN LAYER: CPT

## 2018-10-04 PROCEDURE — G0463 HOSPITAL OUTPT CLINIC VISIT: HCPCS | Performed by: OBSTETRICS & GYNECOLOGY

## 2018-10-04 PROCEDURE — 99213 OFFICE O/P EST LOW 20 MIN: CPT | Performed by: OBSTETRICS & GYNECOLOGY

## 2018-10-04 PROCEDURE — 87210 SMEAR WET MOUNT SALINE/INK: CPT | Mod: ZL | Performed by: OBSTETRICS & GYNECOLOGY

## 2018-10-04 RX ORDER — METRONIDAZOLE 500 MG/1
500 TABLET ORAL 2 TIMES DAILY PRN
Refills: 1 | COMMUNITY
Start: 2018-10-01 | End: 2019-02-15

## 2018-10-04 ASSESSMENT — PAIN SCALES - GENERAL: PAINLEVEL: MILD PAIN (3)

## 2018-10-04 NOTE — MR AVS SNAPSHOT
After Visit Summary   10/4/2018    Brissa Santana    MRN: [id number]           Patient Information     Date Of Birth          1980        Visit Information        Provider Department      10/4/2018 3:30 PM Antelmo Jeong MD [facility]        Today's Diagnoses     Papanicolaou smear of cervix with low grade squamous intraepithelial lesion (LGSIL)    -  1    Vaginal discharge        Vaginitis and vulvovaginitis          Care Instructions    F/u 2-3 weeks          Follow-ups after your visit        Your next 10 appointments already scheduled     Oct 26, 2018  1:30 PM CDT   (Arrive by 1:15 PM)   SHORT [facility]    [address]  [place] [address]   [phone]            Oct 31, 2018  1:45 PM CDT   (Arrive by 1:30 PM)   SHORT with Abhilash Philip MD   [facility] [place] ([facility] [place] )    [address]  [place] [address]   [phone]              Who to contact     If you have questions or need follow up information about today's clinic visit or your schedule please contact [facility] directly at [phone].  Normal or non-critical lab and imaging results will be communicated to you by [employer], letter or phone within 4 business days after the clinic has received the results. If you do not hear from us within 7 days, please contact the clinic through [employer] or phone. If you have a critical or abnormal lab result, we will notify you by phone as soon as possible.  Submit refill requests through [employer] or call your pharmacy and they will forward the refill request to us. Please allow 3 business days for your refill to be completed.          Additional Information About Your Visit        [employer] Guide[employer] Office Information     [employer] gives you secure access to your electronic health record. If you see a primary care provider, you can also send  "messages to your care team and make appointments. If you have questions, please call your primary care clinic.  If you do not have a primary care provider, please call [phone] and they will assist you.        Care EveryWhere ID     This is your Care EveryWhere ID. This could be used by other organizations to access your [facility] medical records  [id number]        Your Vitals Were     Pulse Height BMI (Body Mass Index)             50 5' 3\" (1.6 m) 26.57 kg/m2          Blood Pressure from Last 3 Encounters:   10/04/18 122/73   05/02/18 154/80   04/19/18 135/90    Weight from Last 3 Encounters:   10/04/18 150 lb (68 kg)   03/23/18 154 lb (69.9 kg)   03/15/18 154 lb (69.9 kg)              We Performed the Following     A pap thin layer diagnostic with HPV (select HPV order below)     GC/Chlamydia by PCR - [facility]     HPV High Risk Types DNA Cervical     Wet prep        Primary Care Provider Office Phone # Fax #    Abhilash Philip [phone]       [facility] [place] [id number]        Equal Access to Services     [facility] : Hadii gracie sahni hadasho Soomaali, waaxda luqadaha, qaybta kaalmada ademateo, ronnell ramirez . So [facility] [phone].    ATENCIÓN: Si habla español, tiene a anderson disposición servicios gratuitos de asistencia lingüística. [facility] [phone].    We comply with applicable federal civil rights laws and [place] laws. We do not discriminate on the basis of race, color, national origin, age, disability, sex, sexual orientation, or gender identity.            Thank you!     Thank you for choosing [facility]  for your care. Our goal is always to provide you with excellent care. Hearing back from our patients is one way we can continue to improve our services. Please take a few minutes to complete the written survey that you may receive in the mail after your visit with us. Thank you!             Your Updated Medication List - " Protect others around you: Learn how to safely use, store and throw away your medicines at www.disposemymeds.org.          This list is accurate as of 10/4/18  5:34 PM.  Always use your most recent med list.                   Brand Name Dispense Instructions for use Diagnosis    ibuprofen 800 MG tablet    ADVIL/MOTRIN    42 tablet    TAKE 1 TABLET BY MOUTH THREE TIMES DAILY X 14 DAYS    S/P hemorrhoidectomy       metroNIDAZOLE 500 MG tablet    FLAGYL     Take 500 mg by mouth 2 times daily as needed        venlafaxine 150 MG 24 hr capsule    EFFEXOR-XR    30 capsule    TAKE 1 CAPSULE(150 MG) BY MOUTH DAILY    MDD (major depressive disorder), recurrent, severe, with psychosis (H), FRANCIA (generalized anxiety disorder)

## 2018-10-04 NOTE — PROGRESS NOTES
S:  F/u abnormal pap smear, vaginitis  HPI:  37 yo P2  F (CS and BTO) with h/o LGSIL pap, HPV and nml colpo 1 year ago presents for f/u pap.  Menses regualr without IMB.  She co of vaginal irritation/pain/dyspareunia.  Denies AUB.  H/o recurrent BV.  Has noted whitish color changes inner labia.  See my prior evals.         Patient Active Problem List   Diagnosis     Tramadol dependency and abuse     Drug overdose     ACP (advance care planning)     MDD (major depressive disorder), recurrent, severe, with psychosis (H)     FRANCIA (generalized anxiety disorder)     Chemical dependency (H)     Neck pain     History of reduction mammoplasty     History of spinal surgery     Insomnia     Inflammation of sacroiliac joint (H)     NO SHOW            Past Medical History:   Diagnosis Date     Backache, unspecified 2007     Chemical dependency (H) 2016     FRANCIA (generalized anxiety disorder)      Heroin use 2013     Lumbago 2003     MDD (major depressive disorder), recurrent, severe, with psychosis (H)      Polypharmacy -h/o tramadol,ambien,narcotic depend. 2013     Tramadol dependency and abuse 2011            Past Surgical History:   Procedure Laterality Date     APPENDECTOMY       BACK SURGERY      lumbar diskectomy with fusion      SECTION      x2     COLONOSCOPY N/A 2018    Procedure: COLONOSCOPY;  DIAGNOSTIC COLONOSCOPY TOTAL HEMORRHOIDECTOMY;  Surgeon: Sharif Yeboah MD;  Location: [facility]     D & C       HEMORRHOIDECTOMY INTERNAL N/A 2018    Procedure: HEMORRHOIDECTOMY INTERNAL;;  Surgeon: Sharif Yeboah MD;  Location: [facility]     OTHER SURGICAL HISTORY      Breast Reduction     TONSILLECTOMY       TUBAL LIGATION              Social History   Substance Use Topics     Smoking status: Former Smoker     Packs/day: 1.00     Years: 15.00     Types: Cigarettes     Quit date: 3/11/2017     Smokeless tobacco: Never Used      Comment: Tried to quit (yes); longest period  "tobacco-free- 9 months     Alcohol use No            Family History   Problem Relation Age of Onset     Breast Cancer Maternal Grandmother      Cancer Paternal Grandmother      Hepatitis Paternal Uncle      Hep C     Hepatitis Father      Hep C               Allergies   Allergen Reactions     Latex      Irritation; see comments            Current Outpatient Prescriptions   Medication Sig Dispense Refill     ibuprofen (ADVIL/MOTRIN) 800 MG tablet TAKE 1 TABLET BY MOUTH THREE TIMES DAILY X 14 DAYS 42 tablet 0     venlafaxine (EFFEXOR-XR) 150 MG 24 hr capsule TAKE 1 CAPSULE(150 MG) BY MOUTH DAILY 30 capsule 0     metroNIDAZOLE (FLAGYL) 500 MG tablet Take 500 mg by mouth 2 times daily as needed  1          Review Of Systems  Constitutional:  Denies fever  GI/ negative except as noted per hpi    O:   /73 (BP Location: Left arm, Cuff Size: Adult Regular)  Pulse 50  Ht 5' 3\" (1.6 m)  Wt 150 lb (68 kg)  BMI 26.57 kg/m2  Gen:  NAD, A and O  Vitals: /73 (BP Location: Left arm, Cuff Size: Adult Regular)  Pulse 50  Ht 5' 3\" (1.6 m)  Wt 150 lb (68 kg)  BMI 26.57 kg/m2  BMI= Body mass index is 26.57 kg/(m^2).  Abd soft, NT, ND  Lymphadenopathy:  neg  Vulva: No lesions, erythema, BUS wnl.  Inner labia with leukoplakia bilaterally.    Vagina: Pink, moist mucosa with rugae,no lesions.  + whitish DC  Cervix: No lesions,   Anus without lesions  Ext.  neg       A: Surveillance pap smear.  Chronic vaginitis with labial leukoplakia   Wet prep, Pap and cervical cultures done.      P:  F/u after testing in 2-3 weeks and consider vulvar biopsy at that time.   Will treat as indicated for infection based on results in the inter.    Pt has my phone number to call as needed if problems in the interim or she does not here her results.   .    "

## 2018-10-12 LAB
COPATH REPORT: NORMAL
PAP: NORMAL

## 2018-10-15 LAB
FINAL DIAGNOSIS: ABNORMAL
HPV HR 12 DNA CVX QL NAA+PROBE: POSITIVE
HPV16 DNA SPEC QL NAA+PROBE: NEGATIVE
HPV18 DNA SPEC QL NAA+PROBE: NEGATIVE
SPECIMEN DESCRIPTION: ABNORMAL
SPECIMEN SOURCE CVX/VAG CYTO: ABNORMAL

## 2018-10-29 DIAGNOSIS — B96.89 BV (BACTERIAL VAGINOSIS): Primary | ICD-10-CM

## 2018-10-29 DIAGNOSIS — N76.0 BV (BACTERIAL VAGINOSIS): Primary | ICD-10-CM

## 2018-10-29 NOTE — TELEPHONE ENCOUNTER
metroNIDAZOLE (FLAGYL) 500 MG tablet     Last Written Prescription Date:  medication not on list   Last Fill Quantity: 0,   # refills: 0  Last Office Visit: 3/5/18  Future Office visit:    Next 5 appointments (look out 90 days)     Oct 31, 2018  1:45 PM CDT   (Arrive by 1:30 PM)   SHORT with Abhilash Philip MD   [facility] ([facility] )    [address] [place] [address]   [phone]            Nov 06, 2018  1:45 PM CST   (Arrive by 1:30 PM)   SHORT with Antelmo Jeong MD   [facility] ([facility]    [address] [place] [address]   [phone]                   Routing refill request to provider for review/approval because:  Drug not active on patient's medication list

## 2018-10-31 RX ORDER — METRONIDAZOLE 500 MG/1
TABLET ORAL
Qty: 20 TABLET | Refills: 0 | Status: SHIPPED | OUTPATIENT
Start: 2018-10-31 | End: 2018-12-19

## 2018-11-12 ENCOUNTER — TELEPHONE (OUTPATIENT)
Dept: FAMILY MEDICINE | Facility: OTHER | Age: 38
End: 2018-11-12

## 2018-11-12 DIAGNOSIS — F41.1 GAD (GENERALIZED ANXIETY DISORDER): ICD-10-CM

## 2018-11-12 DIAGNOSIS — F33.3 MDD (MAJOR DEPRESSIVE DISORDER), RECURRENT, SEVERE, WITH PSYCHOSIS (H): ICD-10-CM

## 2018-11-12 RX ORDER — VENLAFAXINE HYDROCHLORIDE 37.5 MG/1
CAPSULE, EXTENDED RELEASE ORAL
Qty: 12 CAPSULE | Refills: 0 | Status: SHIPPED | OUTPATIENT
Start: 2018-11-12 | End: 2019-05-09

## 2018-11-12 RX ORDER — VENLAFAXINE HYDROCHLORIDE 150 MG/1
CAPSULE, EXTENDED RELEASE ORAL
Qty: 30 CAPSULE | Refills: 0 | Status: SHIPPED | OUTPATIENT
Start: 2018-11-12 | End: 2018-12-19

## 2018-11-12 NOTE — TELEPHONE ENCOUNTER
Spoke to patient - is out of effexor for 7 days. Just called pharmacy today to send in request. Pharmacy stated that she is unable to get refill until follow up appointment with PCP. Patient would like refill ASAP. Recommended making an appointment first. Has appointment scheduled for 12/10/18. Would like a refill until appointment ASAP. Patient stated is getting sick from not being on medication.     Informed her to have her pharmacy send [facility] a refill request.    Lynsey Ramos LPN

## 2018-11-12 NOTE — TELEPHONE ENCOUNTER
,   Patient has appointment scheduled.  She has been out of Effexor and has not been taking.  We can send in refill to make until appointment but should she continue as previously prescribed? Please advise. Thank you    Next 5 appointments (look out 90 days)     Dec 10, 2018  3:30 PM CST   (Arrive by 3:15 PM)   SHORT with Abhilash Philip MD   [facility] )    [address] [place] [address]   [phone]

## 2018-11-12 NOTE — TELEPHONE ENCOUNTER
Attempted to call and notify patient of directions below by .  No answer.  Left message to return call to clinic and ask for Triage nurse.

## 2018-12-19 DIAGNOSIS — F33.3 MDD (MAJOR DEPRESSIVE DISORDER), RECURRENT, SEVERE, WITH PSYCHOSIS (H): ICD-10-CM

## 2018-12-19 DIAGNOSIS — F41.1 GAD (GENERALIZED ANXIETY DISORDER): ICD-10-CM

## 2018-12-19 DIAGNOSIS — N76.0 BV (BACTERIAL VAGINOSIS): ICD-10-CM

## 2018-12-19 DIAGNOSIS — B96.89 BV (BACTERIAL VAGINOSIS): ICD-10-CM

## 2018-12-21 ENCOUNTER — TELEPHONE (OUTPATIENT)
Dept: FAMILY MEDICINE | Facility: OTHER | Age: 38
End: 2018-12-21

## 2018-12-21 RX ORDER — METRONIDAZOLE 500 MG/1
TABLET ORAL
Qty: 20 TABLET | Refills: 0 | Status: SHIPPED | OUTPATIENT
Start: 2018-12-21 | End: 2019-01-24

## 2018-12-21 RX ORDER — VENLAFAXINE HYDROCHLORIDE 150 MG/1
CAPSULE, EXTENDED RELEASE ORAL
Qty: 30 CAPSULE | Refills: 0 | Status: SHIPPED | OUTPATIENT
Start: 2018-12-21 | End: 2019-02-08

## 2018-12-21 NOTE — TELEPHONE ENCOUNTER
Flagyl - per last note - patient has h/o recurrent BV.  No show for last appointment in November. Please advise on refill. Thank you  Last visit: 10.4.18  Last refill: 10.31.18 #20

## 2018-12-21 NOTE — TELEPHONE ENCOUNTER
Effexor - no show'd last appointment   Last visit: 3.5.18  Last refill: 11.12.18 #30    PHQ-9 score:    PHQ-9 SCORE 3/5/2018   PHQ-9 Total Score -   PHQ-9 Total Score 7

## 2018-12-21 NOTE — TELEPHONE ENCOUNTER
Call pt - NO SHOWED me twice in a role.  Make another appt. I refilled her FRANCIA meds for a month,. IF SHE NS again- I will dismiss her from my practice.  Please document your conversation --needs to be seen in less than 30 days.  (Routing comment)     No Answer, No voicemail set up.

## 2019-01-23 DIAGNOSIS — N76.0 BV (BACTERIAL VAGINOSIS): ICD-10-CM

## 2019-01-23 DIAGNOSIS — B96.89 BV (BACTERIAL VAGINOSIS): ICD-10-CM

## 2019-01-23 NOTE — TELEPHONE ENCOUNTER
metroNIDAZOLE (FLAGYL) 500 MG tablet      Last Written Prescription Date:  12/21/18  Last Fill Quantity: 20,   # refills: 0  Last Office Visit: 10/4/18  Future Office visit:       Routing refill request to provider for review/approval because:  Drug not on the [facility], [facility] or [facility] refill protocol or controlled substance   Pt was a no show for appt on 11/6/18

## 2019-01-24 RX ORDER — METRONIDAZOLE 500 MG/1
TABLET ORAL
Qty: 20 TABLET | Refills: 0 | OUTPATIENT
Start: 2019-01-24

## 2019-01-24 RX ORDER — METRONIDAZOLE 500 MG/1
500 TABLET ORAL 2 TIMES DAILY
Qty: 14 TABLET | Refills: 1 | Status: SHIPPED | OUTPATIENT
Start: 2019-01-24 | End: 2019-02-15

## 2019-01-30 ENCOUNTER — HOSPITAL ENCOUNTER (EMERGENCY)
Facility: HOSPITAL | Age: 39
Discharge: HOME | End: 2019-01-30
Attending: NURSE PRACTITIONER | Admitting: NURSE PRACTITIONER
Payer: COMMERCIAL

## 2019-01-30 VITALS
RESPIRATION RATE: 14 BRPM | SYSTOLIC BLOOD PRESSURE: 139 MMHG | OXYGEN SATURATION: 100 % | TEMPERATURE: 97.9 F | DIASTOLIC BLOOD PRESSURE: 86 MMHG

## 2019-01-30 DIAGNOSIS — M54.50 ACUTE LEFT-SIDED LOW BACK PAIN WITHOUT SCIATICA: ICD-10-CM

## 2019-01-30 PROCEDURE — G0463 HOSPITAL OUTPT CLINIC VISIT: HCPCS | Mod: 25

## 2019-01-30 PROCEDURE — 96372 THER/PROPH/DIAG INJ SC/IM: CPT

## 2019-01-30 PROCEDURE — 99213 OFFICE O/P EST LOW 20 MIN: CPT | Performed by: NURSE PRACTITIONER

## 2019-01-30 PROCEDURE — 25000128 H RX IP 250 OP 636: Performed by: NURSE PRACTITIONER

## 2019-01-30 RX ORDER — CYCLOBENZAPRINE HCL 10 MG
10 TABLET ORAL 3 TIMES DAILY PRN
Qty: 20 TABLET | Refills: 0 | Status: SHIPPED | OUTPATIENT
Start: 2019-01-30 | End: 2019-02-15

## 2019-01-30 RX ORDER — KETOROLAC TROMETHAMINE 30 MG/ML
60 INJECTION, SOLUTION INTRAMUSCULAR; INTRAVENOUS ONCE
Status: COMPLETED | OUTPATIENT
Start: 2019-01-30 | End: 2019-01-30

## 2019-01-30 RX ORDER — KETOROLAC TROMETHAMINE 10 MG/1
10 TABLET, FILM COATED ORAL EVERY 6 HOURS PRN
Qty: 20 TABLET | Refills: 0 | Status: SHIPPED | OUTPATIENT
Start: 2019-01-30 | End: 2019-02-15

## 2019-01-30 RX ADMIN — KETOROLAC TROMETHAMINE 60 MG: 30 INJECTION, SOLUTION INTRAMUSCULAR at 18:06

## 2019-01-30 ASSESSMENT — ENCOUNTER SYMPTOMS
CONSTIPATION: 0
BACK PAIN: 1
FEVER: 1
DIFFICULTY URINATING: 0
NUMBNESS: 0
FATIGUE: 0
COUGH: 0
HEADACHES: 0
WEAKNESS: 0
SHORTNESS OF BREATH: 0

## 2019-01-30 NOTE — ED AVS SNAPSHOT
[facility]  [address] [address]  Phone:  [phone]                                    Brissa Santana   MRN: [id number]    Department:  [facility]   Date of Visit:  1/30/2019           After Visit Summary Signature Page    I have received my discharge instructions, and my questions have been answered. I have discussed any challenges I see with this plan with the nurse or doctor.    ..........................................................................................................................................  Patient/Patient Representative Signature      ..........................................................................................................................................  Patient Representative Print Name and Relationship to Patient    ..................................................               ................................................  Date                                   Time    ..........................................................................................................................................  Reviewed by Signature/Title    ...................................................              ..............................................  Date                                               Time          22EPIC Rev 08/18
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
## 2019-01-31 NOTE — DISCHARGE INSTRUCTIONS
1. Toradol 10mg every 6 hours with food. (antiinflammatory  )   2, Flexeril  10mg 3 times a day as needed(may make you tired)    3. Drink lots of fluids.    4.  Ice/ and alternate heat for now. Rest.    5 Followup with Dr. Philip  if  poor relief. as may need Physical therapy referral.

## 2019-02-08 DIAGNOSIS — F41.1 GAD (GENERALIZED ANXIETY DISORDER): ICD-10-CM

## 2019-02-08 DIAGNOSIS — F33.3 MDD (MAJOR DEPRESSIVE DISORDER), RECURRENT, SEVERE, WITH PSYCHOSIS (H): ICD-10-CM

## 2019-02-11 RX ORDER — VENLAFAXINE HYDROCHLORIDE 150 MG/1
CAPSULE, EXTENDED RELEASE ORAL
Qty: 30 CAPSULE | Refills: 0 | Status: SHIPPED | OUTPATIENT
Start: 2019-02-11 | End: 2019-03-28

## 2019-02-11 NOTE — TELEPHONE ENCOUNTER
Failed protocol, due for visit and labs.  Please advise.            venlafaxine (EFFEXOR-XR) 150 MG 24 hr capsule      Last Written Prescription Date:  12/21/18  Last Fill Quantity: 30,   # refills: 0  Last Office Visit: 3/5/18  Future Office visit:    Next 5 appointments (look out 90 days)    Feb 14, 2019  1:30 PM CST  (Arrive by 1:15 PM)  SHORT with Antelmo Jeong MD  [facility] ([facility] ) [address]  [place] [address]  [phone]           Routing refill request to provider for review/approval because:  Drug not on the [facility], [facility] or [facility] refill protocol or controlled substance

## 2019-02-15 ENCOUNTER — APPOINTMENT (OUTPATIENT)
Dept: GENERAL RADIOLOGY | Facility: HOSPITAL | Age: 39
End: 2019-02-15
Attending: NURSE PRACTITIONER
Payer: COMMERCIAL

## 2019-02-15 ENCOUNTER — HOSPITAL ENCOUNTER (EMERGENCY)
Facility: HOSPITAL | Age: 39
Discharge: HOME | End: 2019-02-15
Attending: NURSE PRACTITIONER | Admitting: NURSE PRACTITIONER
Payer: COMMERCIAL

## 2019-02-15 VITALS
SYSTOLIC BLOOD PRESSURE: 140 MMHG | DIASTOLIC BLOOD PRESSURE: 95 MMHG | TEMPERATURE: 97.5 F | OXYGEN SATURATION: 100 % | RESPIRATION RATE: 16 BRPM

## 2019-02-15 DIAGNOSIS — M75.22 BICEPS TENDONITIS, LEFT: ICD-10-CM

## 2019-02-15 PROCEDURE — 73060 X-RAY EXAM OF HUMERUS: CPT | Mod: TC,LT

## 2019-02-15 PROCEDURE — 96372 THER/PROPH/DIAG INJ SC/IM: CPT

## 2019-02-15 PROCEDURE — 25000128 H RX IP 250 OP 636: Performed by: NURSE PRACTITIONER

## 2019-02-15 PROCEDURE — 99213 OFFICE O/P EST LOW 20 MIN: CPT | Performed by: NURSE PRACTITIONER

## 2019-02-15 PROCEDURE — G0463 HOSPITAL OUTPT CLINIC VISIT: HCPCS | Mod: 25

## 2019-02-15 RX ORDER — KETOROLAC TROMETHAMINE 30 MG/ML
30 INJECTION, SOLUTION INTRAMUSCULAR; INTRAVENOUS ONCE
Status: COMPLETED | OUTPATIENT
Start: 2019-02-15 | End: 2019-02-15

## 2019-02-15 RX ORDER — METHYLPREDNISOLONE 4 MG
8 TABLET, DOSE PACK ORAL SEE ADMIN INSTRUCTIONS
Qty: 21 TABLET | Refills: 0 | Status: SHIPPED | OUTPATIENT
Start: 2019-02-15 | End: 2019-05-09

## 2019-02-15 RX ADMIN — KETOROLAC TROMETHAMINE 30 MG: 30 INJECTION, SOLUTION INTRAMUSCULAR at 14:45

## 2019-02-15 ASSESSMENT — ENCOUNTER SYMPTOMS
FEVER: 0
COUGH: 0
WEAKNESS: 0
CHILLS: 0
APPETITE CHANGE: 0
ACTIVITY CHANGE: 1
PSYCHIATRIC NEGATIVE: 1
TROUBLE SWALLOWING: 0
DYSURIA: 0
NUMBNESS: 0

## 2019-02-15 NOTE — ED PROVIDER NOTES
History     Chief Complaint   Patient presents with     Arm Pain     c/o lt arm pain with movement x 10 days, denies injury     The history is provided by the patient. No  was used.     Brissa Santana is a 38 year old female who presents with left arm pain that started 10 days ago. Denies injury or trauma. No interventions for symptoms. Denies fever, chills, or night sweats. Eating and drinking well. Bowel and bladder are working well. She is left hand dominant. Denies IV drug use. Denies recent IV in left arm.     She was seen on 1-30-19 in the  for back pain. Back pain resolved, but left arm started hurting after she was seen. Activity makes arm pain worse.       Allergies:  Allergies   Allergen Reactions     Latex      Irritation; see comments       Problem List:    Patient Active Problem List    Diagnosis Date Noted     NO SHOW 07/20/2017     Priority: Medium     No showed Dr. Jeong 7/20/17       ACP (advance care planning) 07/14/2016     Priority: Medium     Advance Care Planning 7/14/2016: ACP Review of Chart / Resources Provided:  Reviewed chart for advance care plan.  Brissa Santana has no plan or code status on file. Discussed available resources and provided with information.   Added by Corbin Valencia             Chemical dependency (H) 07/14/2016     Priority: Medium     MDD (major depressive disorder), recurrent, severe, with psychosis (H)      Priority: Medium     FRANCIA (generalized anxiety disorder)      Priority: Medium     Drug overdose 02/19/2016     Priority: Medium     Inflammation of sacroiliac joint (H) 10/16/2015     Priority: Medium     Insomnia 11/21/2013     Priority: Medium     Neck pain 05/20/2013     Priority: Medium     History of reduction mammoplasty 05/20/2013     Priority: Medium     History of spinal surgery 05/20/2013     Priority: Medium     Tramadol dependency and abuse 03/16/2011     Priority: Medium        Past Medical History:    Past Medical History:    Diagnosis Date     Backache, unspecified 2007     Chemical dependency (H) 2016     FRANCIA (generalized anxiety disorder)      Heroin use 2013     Lumbago 2003     MDD (major depressive disorder), recurrent, severe, with psychosis (H)      Polypharmacy -h/o tramadol,ambien,narcotic depend. 2013     Tramadol dependency and abuse 2011       Past Surgical History:    Past Surgical History:   Procedure Laterality Date     APPENDECTOMY       BACK SURGERY      lumbar diskectomy with fusion      SECTION      x2     COLONOSCOPY N/A 2018    Procedure: COLONOSCOPY;  DIAGNOSTIC COLONOSCOPY TOTAL HEMORRHOIDECTOMY;  Surgeon: Sharif Yeboah MD;  Location: [facility]     D & C       HEMORRHOIDECTOMY INTERNAL N/A 2018    Procedure: HEMORRHOIDECTOMY INTERNAL;;  Surgeon: Sharif Yeboah MD;  Location: [facility]     OTHER SURGICAL HISTORY      Breast Reduction     TONSILLECTOMY       TUBAL LIGATION         Family History:    Family History   Problem Relation Age of Onset     Breast Cancer Maternal Grandmother      Cancer Paternal Grandmother      Hepatitis Paternal Uncle         Hep C     Hepatitis Father         Hep C       Social History:  Marital Status:  Single [1]  Social History     Tobacco Use     Smoking status: Former Smoker     Packs/day: 1.00     Years: 15.00     Pack years: 15.00     Types: Cigarettes     Last attempt to quit: 3/11/2017     Years since quittin.9     Smokeless tobacco: Never Used     Tobacco comment: Tried to quit (yes); longest period tobacco-free- 9 months   Substance Use Topics     Alcohol use: No     Drug use: No        Medications:      methylPREDNISolone (MEDROL DOSEPAK) 4 MG tablet therapy pack   venlafaxine (EFFEXOR-XR) 150 MG 24 hr capsule   ibuprofen (ADVIL/MOTRIN) 800 MG tablet   venlafaxine (EFFEXOR-XR) 37.5 MG 24 hr capsule         Review of Systems   Constitutional: Positive for activity change. Negative for appetite change, chills and  fever.   HENT: Negative for trouble swallowing.    Respiratory: Negative for cough.    Genitourinary: Negative for dysuria.   Musculoskeletal:        Left arm pain.    Skin: Negative for rash.   Neurological: Negative for weakness and numbness.   Psychiatric/Behavioral: Negative.        Physical Exam   BP: 140/95  Heart Rate: 100  Temp: 97.5  F (36.4  C)  Resp: 16  SpO2: 100 %      Physical Exam   Constitutional: She is oriented to person, place, and time. She appears well-developed and well-nourished. No distress.   HENT:   Head: Normocephalic.   Mouth/Throat: Oropharynx is clear and moist.   Neck: Normal range of motion. Neck supple.   Cardiovascular: Normal rate, regular rhythm and normal heart sounds.   No murmur heard.  Pulmonary/Chest: Effort normal. No respiratory distress.   Abdominal: Soft. She exhibits no distension.   Musculoskeletal: Normal range of motion. She exhibits tenderness. She exhibits no edema or deformity.   CMS and ROM intact to left upper extremity. Left radial pulse +2. Tenderness to bicep muscle with flexing that radiates down arm. Pain is reproducible. No scapula tenderness. No rotator cuff tenderness.    Neurological: She is alert and oriented to person, place, and time. She exhibits normal muscle tone.   Skin: Skin is warm and dry. Capillary refill takes less than 2 seconds. She is not diaphoretic. No erythema.   Psychiatric: She has a normal mood and affect. Her behavior is normal.   Nursing note and vitals reviewed.      ED Course     Procedures    I personally reviewed the x-rays and there is NO fracture or dislocation. Radiology review pending and nurse will notify patient if there is any change in the treatment plan.    Results for orders placed or performed during the hospital encounter of 02/15/19   Humerus XR,  G/E 2 views, left    Narrative    Exam: XR HUMERUS LT G/E 2 VW     History:Female, age 38 years, Left humerus pain.    Comparison:  None    Technique: Two views are  submitted    Findings: Bones are normally mineralized. No evidence of acute or  subacute fracture.  No evidence of dislocation.           Impression    Impression:  No evidence of acute or subacute bony abnormality.    ALEA GUILLEN MD     Medications   ketorolac (TORADOL) injection 30 mg (30 mg Intramuscular Given 2/15/19 1445)     Monitored for 20 minutes and tolerated well.     Assessments & Plan (with Medical Decision Making)     She requested an XRAY that is negative for fracture.     She's taken steroids in the past and has tolerated well.     Discussed plan of care. She verbalized understanding. All questions answered.     I have reviewed the nursing notes.    I have reviewed the findings, diagnosis, plan and need for follow up with the patient.  Discharged in stable condition.        Medication List      Started    methylPREDNISolone 4 MG tablet therapy pack  Commonly known as:  MEDROL DOSEPAK  8 mg, Oral, SEE ADMIN INSTRUCTIONS, follow package directions            Final diagnoses:   Biceps tendonitis, left     Take Medrol dose pack as ordered. Take in the morning.   If you feel like hurting yourself or someone else while taking Medrol dose pack, stop and seek help.   Ice to left upper arm.   Follow up with PCP with any increase in symptoms or concerns.   Return to urgent care or emergency department with any increase in symptoms or concerns.     SHANELL Queen  2/15/2019  2:24 PM  [facility]       Juliet Cool NP  02/17/19 2004

## 2019-02-15 NOTE — ED AVS SNAPSHOT
[facility]  [address] [address]  Phone:  [phone]                                    Brissa Santana   MRN: [id number]    Department:  [facility]   Date of Visit:  2/15/2019           After Visit Summary Signature Page    I have received my discharge instructions, and my questions have been answered. I have discussed any challenges I see with this plan with the nurse or doctor.    ..........................................................................................................................................  Patient/Patient Representative Signature      ..........................................................................................................................................  Patient Representative Print Name and Relationship to Patient    ..................................................               ................................................  Date                                   Time    ..........................................................................................................................................  Reviewed by Signature/Title    ...................................................              ..............................................  Date                                               Time          22EPIC Rev 08/18

## 2019-02-15 NOTE — DISCHARGE INSTRUCTIONS
Take Medrol dose pack as ordered. Take in the morning.   If you feel like hurting yourself or someone else while taking Medrol dose pack, stop and seek help.   Ice to left upper arm.   Follow up with PCP with any increase in symptoms or concerns.   Return to urgent care or emergency department with any increase in symptoms or concerns.

## 2019-02-15 NOTE — ED TRIAGE NOTES
Pt presents today alone for c/o left arm pain that starts at her upper posterior arm and shoots all the way down to her fingers, prior to this she was seen in the ER for back pain which resolved, but when it did the arm pain started.

## 2019-03-28 DIAGNOSIS — N76.0 BV (BACTERIAL VAGINOSIS): ICD-10-CM

## 2019-03-28 DIAGNOSIS — B96.89 BV (BACTERIAL VAGINOSIS): ICD-10-CM

## 2019-03-28 RX ORDER — METRONIDAZOLE 500 MG/1
TABLET ORAL
Qty: 14 TABLET | Refills: 0 | Status: SHIPPED | OUTPATIENT
Start: 2019-03-28 | End: 2019-05-09

## 2019-03-28 NOTE — TELEPHONE ENCOUNTER
Not on current med list.    metroNIDAZOLE (FLAGYL) 500 MG tablet       Last Written Prescription Date:  1/24/19  Last Fill Quantity: 14,   # refills: 1  Last Office Visit: 12/10/18  Future Office visit:       Routing refill request to provider for review/approval because:  Discontinued  2-15-19

## 2019-03-29 ENCOUNTER — TELEPHONE (OUTPATIENT)
Dept: FAMILY MEDICINE | Facility: OTHER | Age: 39
End: 2019-03-29

## 2019-03-29 NOTE — TELEPHONE ENCOUNTER
PT IS OVERDUE FOR F/U ON DEPRESSION AND ANXIETY - ONLY 2 WEEKS RX GIVEN - NEEDS TO BE SEEN -- H/O MANY NO SHOWS- NEEDS TO SHOW UP     ALSO MANY NO SHOWS FOR DR STODDARD- NEEDS F/U ON GYN ABNORMALITIES.       DOCUMENT IN CHART CONVERSATION OR THIS MSG.      Pt scheduled her follow up with Dr. Philip for 04/05/19. Pt does not have a car and can't make her appts. She will call at a later date to reschedule Dr. Stoddard

## 2019-05-01 ENCOUNTER — TELEPHONE (OUTPATIENT)
Dept: FAMILY MEDICINE | Facility: OTHER | Age: 39
End: 2019-05-01

## 2019-05-01 DIAGNOSIS — F33.3 MDD (MAJOR DEPRESSIVE DISORDER), RECURRENT, SEVERE, WITH PSYCHOSIS (H): ICD-10-CM

## 2019-05-01 DIAGNOSIS — F41.1 GAD (GENERALIZED ANXIETY DISORDER): ICD-10-CM

## 2019-05-01 RX ORDER — VENLAFAXINE HYDROCHLORIDE 150 MG/1
CAPSULE, EXTENDED RELEASE ORAL
Qty: 30 CAPSULE | Refills: 0 | Status: SHIPPED | OUTPATIENT
Start: 2019-05-01 | End: 2019-05-09

## 2019-05-01 NOTE — TELEPHONE ENCOUNTER
Patient without Effexor of 2 days.  Patient is experiencing Dizziness, nausea, and weakness. Patient request a refill on Effexor.  Patient is needing appointment and states she has appointment for 05/09/19.  Due to patient experiencing symptoms patient was advised to be seen in Urgent Care.  Patient understood recommendation but didn't agree with recommendation.    Effexor 150   Last office visit: 03/05/18  Last refill: 03/28/19 #15.    Thank you.

## 2019-05-02 RX ORDER — VENLAFAXINE HYDROCHLORIDE 150 MG/1
CAPSULE, EXTENDED RELEASE ORAL
Qty: 30 CAPSULE | Refills: 0 | Status: SHIPPED | OUTPATIENT
Start: 2019-05-02 | End: 2019-05-09

## 2019-05-02 NOTE — TELEPHONE ENCOUNTER
Next 5 appointments (look out 90 days)    May 09, 2019  1:45 PM CDT  (Arrive by 1:30 PM)  SHORT with Abhilash Philip MD  [facility] ([facility] ) [address]  [place] [address]  [phone]

## 2019-05-09 ENCOUNTER — PATIENT OUTREACH (OUTPATIENT)
Dept: CARE COORDINATION | Facility: OTHER | Age: 39
End: 2019-05-09

## 2019-05-09 ENCOUNTER — OFFICE VISIT (OUTPATIENT)
Dept: FAMILY MEDICINE | Facility: OTHER | Age: 39
End: 2019-05-09
Attending: FAMILY MEDICINE
Payer: COMMERCIAL

## 2019-05-09 VITALS
HEIGHT: 63 IN | HEART RATE: 103 BPM | SYSTOLIC BLOOD PRESSURE: 126 MMHG | OXYGEN SATURATION: 98 % | DIASTOLIC BLOOD PRESSURE: 70 MMHG | BODY MASS INDEX: 24.27 KG/M2 | TEMPERATURE: 98.8 F | WEIGHT: 137 LBS

## 2019-05-09 DIAGNOSIS — F19.20 CHEMICAL DEPENDENCY (H): Primary | ICD-10-CM

## 2019-05-09 DIAGNOSIS — F11.20 UNCOMPLICATED OPIOID DEPENDENCE (H): ICD-10-CM

## 2019-05-09 DIAGNOSIS — F33.3 MDD (MAJOR DEPRESSIVE DISORDER), RECURRENT, SEVERE, WITH PSYCHOSIS (H): ICD-10-CM

## 2019-05-09 DIAGNOSIS — F11.90 OPIOID USE DISORDER: Primary | ICD-10-CM

## 2019-05-09 DIAGNOSIS — F41.1 GAD (GENERALIZED ANXIETY DISORDER): ICD-10-CM

## 2019-05-09 LAB
ALBUMIN SERPL-MCNC: 3.9 G/DL (ref 3.4–5)
ALP SERPL-CCNC: 113 U/L (ref 40–150)
ALT SERPL W P-5'-P-CCNC: 21 U/L (ref 0–50)
AMPHETAMINES UR QL: NOT DETECTED NG/ML
ANION GAP SERPL CALCULATED.3IONS-SCNC: 2 MMOL/L (ref 3–14)
AST SERPL W P-5'-P-CCNC: 13 U/L (ref 0–45)
BARBITURATES UR QL SCN: NOT DETECTED NG/ML
BASOPHILS # BLD AUTO: 0 10E9/L (ref 0–0.2)
BASOPHILS NFR BLD AUTO: 0.6 %
BENZODIAZ UR QL SCN: NOT DETECTED NG/ML
BILIRUB DIRECT SERPL-MCNC: <0.1 MG/DL (ref 0–0.2)
BILIRUB SERPL-MCNC: 0.1 MG/DL (ref 0.2–1.3)
BUN SERPL-MCNC: 10 MG/DL (ref 7–30)
BUPRENORPHINE UR QL: NOT DETECTED NG/ML
CALCIUM SERPL-MCNC: 8.5 MG/DL (ref 8.5–10.1)
CANNABINOIDS UR QL: NOT DETECTED NG/ML
CHLORIDE SERPL-SCNC: 106 MMOL/L (ref 94–109)
CO2 SERPL-SCNC: 30 MMOL/L (ref 20–32)
COCAINE UR QL SCN: NOT DETECTED NG/ML
CREAT SERPL-MCNC: 0.64 MG/DL (ref 0.52–1.04)
D-METHAMPHET UR QL: NOT DETECTED NG/ML
DIFFERENTIAL METHOD BLD: NORMAL
EOSINOPHIL # BLD AUTO: 0.2 10E9/L (ref 0–0.7)
EOSINOPHIL NFR BLD AUTO: 2.5 %
ERYTHROCYTE [DISTWIDTH] IN BLOOD BY AUTOMATED COUNT: 12.7 % (ref 10–15)
GFR SERPL CREATININE-BSD FRML MDRD: >90 ML/MIN/{1.73_M2}
GLUCOSE SERPL-MCNC: 80 MG/DL (ref 70–99)
HCT VFR BLD AUTO: 39 % (ref 35–47)
HGB BLD-MCNC: 13 G/DL (ref 11.7–15.7)
IMM GRANULOCYTES # BLD: 0 10E9/L (ref 0–0.4)
IMM GRANULOCYTES NFR BLD: 0.1 %
LYMPHOCYTES # BLD AUTO: 2.1 10E9/L (ref 0.8–5.3)
LYMPHOCYTES NFR BLD AUTO: 29.4 %
MCH RBC QN AUTO: 31.8 PG (ref 26.5–33)
MCHC RBC AUTO-ENTMCNC: 33.3 G/DL (ref 31.5–36.5)
MCV RBC AUTO: 95 FL (ref 78–100)
METHADONE UR QL SCN: NOT DETECTED NG/ML
MONOCYTES # BLD AUTO: 0.5 10E9/L (ref 0–1.3)
MONOCYTES NFR BLD AUTO: 7.2 %
NEUTROPHILS # BLD AUTO: 4.4 10E9/L (ref 1.6–8.3)
NEUTROPHILS NFR BLD AUTO: 60.2 %
NRBC # BLD AUTO: 0 10*3/UL
NRBC BLD AUTO-RTO: 0 /100
OPIATES UR QL SCN: NOT DETECTED NG/ML
OXYCODONE UR QL SCN: NOT DETECTED NG/ML
PCP UR QL SCN: NOT DETECTED NG/ML
PLATELET # BLD AUTO: 438 10E9/L (ref 150–450)
POTASSIUM SERPL-SCNC: 4 MMOL/L (ref 3.4–5.3)
PROPOXYPH UR QL: NOT DETECTED NG/ML
PROT SERPL-MCNC: 7.6 G/DL (ref 6.8–8.8)
RBC # BLD AUTO: 4.09 10E12/L (ref 3.8–5.2)
SODIUM SERPL-SCNC: 138 MMOL/L (ref 133–144)
TRICYCLICS UR QL SCN: NOT DETECTED NG/ML
WBC # BLD AUTO: 7.3 10E9/L (ref 4–11)

## 2019-05-09 PROCEDURE — 80053 COMPREHEN METABOLIC PANEL: CPT | Mod: ZL | Performed by: FAMILY MEDICINE

## 2019-05-09 PROCEDURE — 86803 HEPATITIS C AB TEST: CPT | Mod: ZL | Performed by: FAMILY MEDICINE

## 2019-05-09 PROCEDURE — 36415 COLL VENOUS BLD VENIPUNCTURE: CPT | Mod: ZL | Performed by: FAMILY MEDICINE

## 2019-05-09 PROCEDURE — 80306 DRUG TEST PRSMV INSTRMNT: CPT | Mod: ZL | Performed by: FAMILY MEDICINE

## 2019-05-09 PROCEDURE — 87389 HIV-1 AG W/HIV-1&-2 AB AG IA: CPT | Mod: ZL | Performed by: FAMILY MEDICINE

## 2019-05-09 PROCEDURE — 99000 SPECIMEN HANDLING OFFICE-LAB: CPT | Performed by: FAMILY MEDICINE

## 2019-05-09 PROCEDURE — G0463 HOSPITAL OUTPT CLINIC VISIT: HCPCS

## 2019-05-09 PROCEDURE — 87340 HEPATITIS B SURFACE AG IA: CPT | Mod: ZL | Performed by: FAMILY MEDICINE

## 2019-05-09 PROCEDURE — 99215 OFFICE O/P EST HI 40 MIN: CPT | Performed by: FAMILY MEDICINE

## 2019-05-09 PROCEDURE — 82248 BILIRUBIN DIRECT: CPT | Mod: ZL | Performed by: FAMILY MEDICINE

## 2019-05-09 PROCEDURE — 86708 HEPATITIS A ANTIBODY: CPT | Mod: ZL | Performed by: FAMILY MEDICINE

## 2019-05-09 PROCEDURE — 86704 HEP B CORE ANTIBODY TOTAL: CPT | Mod: ZL | Performed by: FAMILY MEDICINE

## 2019-05-09 PROCEDURE — 85025 COMPLETE CBC W/AUTO DIFF WBC: CPT | Mod: ZL | Performed by: FAMILY MEDICINE

## 2019-05-09 RX ORDER — METRONIDAZOLE 500 MG/1
TABLET ORAL
Refills: 0 | COMMUNITY
Start: 2019-03-28 | End: 2019-08-18

## 2019-05-09 RX ORDER — VENLAFAXINE HYDROCHLORIDE 150 MG/1
CAPSULE, EXTENDED RELEASE ORAL
Qty: 90 CAPSULE | Refills: 0 | Status: SHIPPED | OUTPATIENT
Start: 2019-05-09 | End: 2019-09-15

## 2019-05-09 ASSESSMENT — ANXIETY QUESTIONNAIRES
4. TROUBLE RELAXING: SEVERAL DAYS
2. NOT BEING ABLE TO STOP OR CONTROL WORRYING: SEVERAL DAYS
1. FEELING NERVOUS, ANXIOUS, OR ON EDGE: SEVERAL DAYS
6. BECOMING EASILY ANNOYED OR IRRITABLE: SEVERAL DAYS
3. WORRYING TOO MUCH ABOUT DIFFERENT THINGS: SEVERAL DAYS
GAD7 TOTAL SCORE: 6
7. FEELING AFRAID AS IF SOMETHING AWFUL MIGHT HAPPEN: SEVERAL DAYS
5. BEING SO RESTLESS THAT IT IS HARD TO SIT STILL: NOT AT ALL

## 2019-05-09 ASSESSMENT — PAIN SCALES - GENERAL: PAINLEVEL: NO PAIN (0)

## 2019-05-09 ASSESSMENT — PATIENT HEALTH QUESTIONNAIRE - PHQ9: SUM OF ALL RESPONSES TO PHQ QUESTIONS 1-9: 9

## 2019-05-09 ASSESSMENT — MIFFLIN-ST. JEOR: SCORE: 1265.56

## 2019-05-09 NOTE — NURSING NOTE
"Chief Complaint   Patient presents with     Depression       Initial /70   Pulse 103   Temp 98.8  F (37.1  C)   Ht 1.6 m (5' 3\")   Wt 62.1 kg (137 lb)   SpO2 98%   BMI 24.27 kg/m   Estimated body mass index is 24.27 kg/m  as calculated from the following:    Height as of this encounter: 1.6 m (5' 3\").    Weight as of this encounter: 62.1 kg (137 lb).  Medication Reconciliation: complete    Corbin Valencia LPN  "

## 2019-05-09 NOTE — PROGRESS NOTES
Clinic Care Coordination Contact  Care Team Conversations    CC met face to face with pt at request of PCP.  Pt has significant h/o substance use disorder.  Reports been to treatment 16 times.  Never been on MAT.  Interested in Suboxone.  CC went over our Suboxone program in depth with pt.  Discussed case with Dr. Stack.  Pt will see Dr. Stack on 5.10.19 for Suboxone Initiation.  Provided pt with CC's business card and encouraged her to call with any questions/concerns/problems.  Verbalized understanding.  Has been off Meth and Heroin for 12 days now.  Was using 1 point per day.  Narcan also sent in and CC demonstrated how to use the nasal spray in case of an opioid overdose.   Also states she used Kratom daily - in a tea.  CC informed her to stop the Kratom.  She verbalized understanding.      Vera Robert RN-BSN  Chronic Pain Care Coordinator  [phone] opt. #3

## 2019-05-10 ENCOUNTER — PATIENT OUTREACH (OUTPATIENT)
Dept: CARE COORDINATION | Facility: OTHER | Age: 39
End: 2019-05-10

## 2019-05-10 ENCOUNTER — OFFICE VISIT (OUTPATIENT)
Dept: FAMILY MEDICINE | Facility: OTHER | Age: 39
End: 2019-05-10
Attending: FAMILY MEDICINE
Payer: COMMERCIAL

## 2019-05-10 VITALS
BODY MASS INDEX: 24.27 KG/M2 | TEMPERATURE: 98.3 F | WEIGHT: 137 LBS | OXYGEN SATURATION: 99 % | HEART RATE: 75 BPM | DIASTOLIC BLOOD PRESSURE: 70 MMHG | SYSTOLIC BLOOD PRESSURE: 132 MMHG | HEIGHT: 63 IN

## 2019-05-10 DIAGNOSIS — F11.90 OPIOID USE DISORDER: Primary | ICD-10-CM

## 2019-05-10 DIAGNOSIS — F15.10 METHAMPHETAMINE USE (H): ICD-10-CM

## 2019-05-10 PROBLEM — Z53.9 NO SHOW: Status: RESOLVED | Noted: 2017-07-20 | Resolved: 2019-05-10

## 2019-05-10 LAB
AMPHETAMINES UR QL: NOT DETECTED NG/ML
BARBITURATES UR QL SCN: NOT DETECTED NG/ML
BENZODIAZ UR QL SCN: NOT DETECTED NG/ML
BUPRENORPHINE UR QL: NOT DETECTED NG/ML
CANNABINOIDS UR QL: NOT DETECTED NG/ML
COCAINE UR QL SCN: NOT DETECTED NG/ML
D-METHAMPHET UR QL: NOT DETECTED NG/ML
METHADONE UR QL SCN: NOT DETECTED NG/ML
OPIATES UR QL SCN: NOT DETECTED NG/ML
OXYCODONE UR QL SCN: NOT DETECTED NG/ML
PCP UR QL SCN: NOT DETECTED NG/ML
PROPOXYPH UR QL: NOT DETECTED NG/ML
TRICYCLICS UR QL SCN: NOT DETECTED NG/ML

## 2019-05-10 PROCEDURE — 99214 OFFICE O/P EST MOD 30 MIN: CPT | Performed by: FAMILY MEDICINE

## 2019-05-10 PROCEDURE — 80306 DRUG TEST PRSMV INSTRMNT: CPT | Mod: ZL | Performed by: FAMILY MEDICINE

## 2019-05-10 PROCEDURE — G0463 HOSPITAL OUTPT CLINIC VISIT: HCPCS

## 2019-05-10 RX ORDER — BUPRENORPHINE AND NALOXONE 2; .5 MG/1; MG/1
1 FILM, SOLUBLE BUCCAL; SUBLINGUAL
Qty: 8 EACH | Refills: 0 | Status: SHIPPED | OUTPATIENT
Start: 2019-05-10 | End: 2019-05-13

## 2019-05-10 ASSESSMENT — ANXIETY QUESTIONNAIRES
5. BEING SO RESTLESS THAT IT IS HARD TO SIT STILL: NOT AT ALL
3. WORRYING TOO MUCH ABOUT DIFFERENT THINGS: SEVERAL DAYS
IF YOU CHECKED OFF ANY PROBLEMS ON THIS QUESTIONNAIRE, HOW DIFFICULT HAVE THESE PROBLEMS MADE IT FOR YOU TO DO YOUR WORK, TAKE CARE OF THINGS AT HOME, OR GET ALONG WITH OTHER PEOPLE: SOMEWHAT DIFFICULT
6. BECOMING EASILY ANNOYED OR IRRITABLE: SEVERAL DAYS
4. TROUBLE RELAXING: SEVERAL DAYS
2. NOT BEING ABLE TO STOP OR CONTROL WORRYING: SEVERAL DAYS
GAD7 TOTAL SCORE: 6
1. FEELING NERVOUS, ANXIOUS, OR ON EDGE: SEVERAL DAYS
7. FEELING AFRAID AS IF SOMETHING AWFUL MIGHT HAPPEN: SEVERAL DAYS
GAD7 TOTAL SCORE: 6

## 2019-05-10 ASSESSMENT — PAIN SCALES - GENERAL: PAINLEVEL: NO PAIN (0)

## 2019-05-10 ASSESSMENT — MIFFLIN-ST. JEOR: SCORE: 1265.56

## 2019-05-10 ASSESSMENT — PATIENT HEALTH QUESTIONNAIRE - PHQ9: SUM OF ALL RESPONSES TO PHQ QUESTIONS 1-9: 8

## 2019-05-10 ASSESSMENT — ACTIVITIES OF DAILY LIVING (ADL): DEPENDENT_IADLS:: INDEPENDENT

## 2019-05-10 NOTE — LETTER
Informed  Consent  and  Agreement  for  Buprenorphine/Naloxone Treatment  Of Opioid Dependence   Brissa Santana  [id number]         May 10, 2019        Buprenorphine/naloxone can control symptoms of withdrawal and can decrease cravings for other opioids,  We recommend not trying to stop this medication on your own, most people who stop on their own have a relapse of their drug use.      This  agreement  provides important information  on the potential benefits and  risks of opioid medications and shows that both  you and your provider agree on a care plan so  that opioid medications are used in a way that is  safe and effective in treating your withdrawal symptoms and opioid dependence.  This agreement is reviewed and signed by all patients in our practice who  receive Buprenorphine/Naloxone (Suboxone/Subutex).           Expected  Benefits or  Goals of Buprenorphine Treatment     Decreased craving     Improved  ability to engage  in work, social, recreational  and/or physical activities     Improved  quality of life           Potential Risks or Side Effects of Buprenorphine Treatment    Overdose Taking more than the prescribed amount of medication or using with alcohol or other drugs can cause you to stop breathing, resulting in coma, brain damage, or even death.      Risk of overdose to other family members, children are at high risk of being harmed by buprenorphine      Physical side effects May include mood changes, drowsiness, nausea, constipation, urination difficulties, depressed breathing, itching, bone thinning and sexual difficulties, such as lowering male hormone in men and stopping menstrual periods in women.      Withdrawal Buprenorphine/naloxone can bring on severe withdrawal if taken with other opioids.     Physical dependence Suddenly stopping buprenorphine may lead to withdrawal symptoms including abdominal cramping, pain, diarrhea, sweating, anxiety, irritability and aching.     Hyperalgesia The  use of opioids can increase the experience of pain. If this happens, it may require change or discontinuation of medication.      Sleep apnea  Sleep apnea (periods of not breathing while asleep) may be caused or worsened by opioids.     Risk to unborn child Risks to unborn children may include: physical dependence at birth, possible alterations in pain perception, possible increased risk for addiction later in life, among others. Tell your provider if you are or intend to become pregnant.  Do NOT stop buprenorphine if you become pregnant without discussing with your provider.      Victimization There is a risk that you or someone in your household may be the victim of theft, deceit, assault or abuse by people trying to take your medications to misuse them.         Responsibilities  in Buprenorphine/naloxone Therapy  of Opioid Addiction   Your provider's responsibilities:   Listening carefully to your concerns, treating you with care and with due respect, and making clinical decisions based on what he/she believes is in your best interest.    Your responsibilities:   In order to maximize the potential benefits of buprenorphine and to minimize the potential risks, it is important that you accept the following responsibilities.    We are a primary care clinic, not an addiction clinic.    In signing this agreement, you agree to:  1. Use your buprenorphine medications as prescribed for the purpose of treating opioid addiction/opioid use disorder.  2. BE HONEST! We will work with you to help with your opioid dependence if you are upfront with us.  3. Be on time for appointments. Frequent late or missed appointments will result in termination from our program.  4. Be respectful and considerate to all staff and providers at our clinic.  Rude or aggressive behavior to staff including providers, nursing staff,  and any others will result in termination from the program.  5. Avoid excessive alcohol or other dangerous  recreational drug use, especially Benzodiazepines (like Xanax/alprozolam, Ativan/lorazepam, Valium/diazepam) while being prescribed buprenorphine. Evidence of this occurring may result in referral for consultation or higher level of care and possible termination from our program.  6. Not share, sell, trade or in any way provide your medications to others.  Evidence of this occurring may result in termination from our buprenorphine program.  7. Not receive controlled substances from other clinics without discussion with your buprenorphine physician.  Evidence of this occurring may result in termination from our buprenorphine program.  8. Starting or increasing dosage may result in difficulty concentrating; so we would recommend having a  to bring you to appointments where this may happen   9. If opioids are prescribed unexpectedly by another office (for example due to an accident or dental procedure), inform this office within 24 hours and bring documentation of the visit.  10. Have urine/blood drug tests on a random basis and as requested by your provider. Be ready for this at each appointment.  11. Bring your medication to the clinic when requested.  12. Store your medication in a secure location away from children.  13. Participate in other chemical dependency treatments or other forms of care agreed to with your provider.  You are expected to keep these appointments and follow your care plan. Buprenorphine alone is not enough to overcome addiction/dependence; you are required to have a care plan beyond just Buprenorphine.    14. Permit this practice to communicate with other care providers and/or your significant  others as needed to assure buprenorphine is being used appropriately and is beneficial to  your health and well-being.  15.  Plan ahead for refills. You are responsible for planning your appointments to get refills on time. If you cannot schedule with your primary provider, it is your  responsibility to schedule with another buprenorphine provider.  If you are restricted to just on provider then arrangements must be made in advance if you need refills when your provider is not available.  16. No phone refills - you must be seen in clinic by one of our physicians that prescribe buprenorphine.  17. No early refills. If you lose your meds, run out early, or have your meds stolen, we will not give early refills. We CAN, however, help with symptoms of withdrawal. Call us if you have any trouble, and we will try to help. We recommend a system to help lock up meds.  18.  I understand that my clinic can track controlled substance prescriptions from other providers through a central database (prescription monitoring program).  19.  To tell my clinic right away if I become pregnant or have a new medical problem treated at another clinic.    Your medications may be continued if they assist you in maintaining sobriety, help you engage in valued activities, and/or enhance your quality of life and you follow the above responsibilities.  Your medications may be discontinued if your goals for treatment are not met, if you experience negative effects from using them, or if you do not follow this agreement.        I have reviewed this document and have been given the opportunity to have any questions  answered. I understand the possible benefits and risks of buprenorphine medications and I accept the  responsibilities described above.    __________________________________        ____________________________________  Patient     Date   Tabitha Sommers MD                     Date

## 2019-05-10 NOTE — PROGRESS NOTES
Clinic Care Coordination Contact  Care Team Conversations    CC attended office visit with pt and Dr. Stack this date.  Please refer to Dr. Stack's note for plan of care.  Suboxone program went over in depth.  CC went over informed consent and consent for treatment with pt and appointed pharmacy consent.  Both signed and sent to get scanned into media.  Pt provided with copies of these.  Been through treatment 16-17 times.  Last completed January 2019.  [facility] in [place].  ROSY signed and faxed for these records.  Provided pt with Suboxone medication guide.  All questions answered.  Will follow up on Monday.  Signed form for Electronic Communication - this sent down to scanning as well.  Pt commended on her commitment to recovery.  She was very grateful and appreciative this date.   Encouraged her to call/text CC with any questions/concerns/problems.  Verbalized understanding.  Mailed intro letter and emergency care plan this date.    Vera Robert RN-BSN  Chronic Pain Care Coordinator  [phone] opt. #[phone]

## 2019-05-10 NOTE — LETTER
Appointed Pharmacy Consent       (buprenorphine HCl/naloxone HCl dihydrate) sublingual tablet or film (buprenorphine HCl) sublingual tablet, naltrexone (oral or extended-release injectable)       I Patient Name __________________________________________________ do hereby: (check all that apply)     [] Authorize at the above address to disclose my treatment for opioid use disorder to employees of the pharmacy specified below. Treatment disclosure most often includes, but may not be limited to, discussing my medications with the pharmacist, and faxing/calling in my buprenorphine/naloxone prescriptions directly to the pharmacy.   [] Agree to purchase all buprenorphine/naloxone, and any other medications related to my treatment from the pharmacy specified below.   [] Agree not to use any pharmacy other than the one specified below for the duration of my treatment with the physician specified above, unless specific arrangements have been made with the physician.   [] Agree to make payment arrangements with the pharmacy specified below in advance of treatment, so that my buprenorphine/naloxone prescriptions can be filled and either delivered to the physician's office address given above or picked-up by employees of the same.   [] I understand that I may withdraw this consent at any time, either verbally or in writing, except to the extent that action has been taken in reliance on it. This consent will last while I am being treated for opioid use disorder by the physician specified above, unless I withdraw my consent during treatment. This consent will  365 days after I complete my treatment, unless the provider specified above is otherwise notified by me.     I understand that the records to be released may contain information pertaining to psychiatric treatment and/or substance use disorder treatment. These records may also contain confidential information about  communicable diseases including HIV/AIDS or related illness. I understand that these records are protected by the Code of Federal Regulations Title 42 Part 2 (42 CFR Part 2), which prohibits the recipient of these records from making any further disclosures to third parties, without the express written consent of the patient.   I acknowledge that I have been notified of my rights pertaining to the confidentiality of my treatment information/records under 42 CFR Part 2, and I further acknowledge that I understand those rights.     Signature of patient ___________________________________________  Date___________      Signature of witness ___________________________________________ Date___________          APPOINTED PHARMACY: [place] [address] [phone]    CONFIDENTIALITY OF SUBSTANCE USE DISORDER PATIENT RECORDS   THE CONFIDENTIALITY OF SUBSTANCE USE DISORDER PATIENT RECORDS MAINTAINED BY THIS PRACTICE/PROGRAM IS PROTECTED BY FEDERAL LAW AND REGULATIONS. GENERALLY, THE PRACTICE/PROGRAM MAY NOT SAY TO A PERSON OUTSIDE THE PRACTICE/PROGRAM THAT A PATIENT ATTENDS THE PRACTICE/PROGRAM, OR DISCLOSE ANY INFORMATION IDENTIFYING A PATIENT AS HAVING OR HAVING HAD A SUBSTANCE USE DISORDER UNLESS:   1. THE PATIENT CONSENTS IN WRITING;   2. THE DISCLOSURE IS ALLOWED BY A COURT ORDER, OR   3. THE DISCLOSURE IS MADE TO MEDICAL PERSONNEL IN A MEDICAL EMERGENCY OR TO QUALIFIED PERSONNEL FOR RESEARCH, AUDIT, OR PRACTICE/PROGRAM EVALUATION.     VIOLATION OF THE FEDERAL LAW AND REGULATIONS BY A PRACTICE/PROGRAM IS A CRIME. SUSPECTED VIOLATIONS MAY BE REPORTED TO APPROPRIATE AUTHORITIES IN ACCORDANCE WITH FEDERAL REGULATIONS. THE REPORT OF ANY VIOLATION OF THESE REGULATIONS MAY BE DIRECTED TO THE  FOR YOUR STATE.   FEDERAL LAW AND REGULATIONS DO NOT PROTECT ANY INFORMATION ABOUT A CRIME COMMITTED BY A PATIENT, EITHER AT THE PRACTICE/PROGRAM OR AGAINST ANY PERSON WHO WORKS FOR THE  PRACTICE/PROGRAM OR ABOUT ANY THREAT TO COMMIT SUCH A CRIME.   FEDERAL LAWS AND REGULATIONS DO NOT PROTECT ANY INFORMATION ABOUT SUSPECTED CHILD ABUSE OR NEGLECT FROM BEING REPORTED UNDER STATE LAW TO THE APPROPRIATE STATE OR LOCAL AUTHORITIES.     After completion, scan form into patient record and provide a copy to the patient.

## 2019-05-10 NOTE — PROGRESS NOTES
Follow-up Buprenorphine Visit           HPI       Brissa Santana is here for f/u on buprenophine/naloxone (Suboxone) therapy for opioid use disorder.    Brissa is currently taking 2/0.5mg of Buprenorphine/Naloxone 2 times daily. This dosage is working to keep cravings at a manageable level, though has had a few over the weekend.  Driving by gas stations can trigger- use to go in and get high in the bathroom. Aside from this, Brissa is currently participating in the following recovery activities went to a meeting on Saturday.    Takes 7AM, second dose at 4-5PM.  Is sleeping well at night.    Other medical concerns: No       Any ED visits? No     Withdrawal Symptoms   Fevers or chills? No   Abdominal pain, nausea, vomiting, diarrhea? No   Depression or anxiety?:No     Substance Use  Any opioids other than suboxone? No   Any alcohol use? No   Any other recreational drug use? No     History   Smoking Status     Former Smoker     Packs/day: 1.00     Years: 15.00     Types: Cigarettes     Quit date: 3/11/2017   Smokeless Tobacco     Never Used     Comment: Tried to quit (yes); longest period tobacco-free- 9 months       Problem, Medication and Allergy Lists were reviewed and updated if needed.  reviewed and are current..    Pharmacy Database reviewed? Yes, 5/13/19, as expected.    Patient is an established patient of this clinic..  CONSTITUTIONAL: NEGATIVE for fever, chills, and has gained weight  INTEGUMENTARY/SKIN: NEGATIVE for worrisome rashes  EYES: NEGATIVE for vision changes or irritation  ENT/MOUTH: NEGATIVE for ear, mouth and throat problems  RESP: NEGATIVE for significant cough or SOB  CV: NEGATIVE for chest pain, palpitations or peripheral edema  GI: NEGATIVE for nausea, abdominal pain, heartburn, or change in bowel habits  : NEGATIVE for frequency, dysuria, or hematuria  MUSCULOSKELETAL: NEGATIVE for significant arthralgias or myalgia  NEURO: NEGATIVE for weakness, dizziness or paresthesias  PSYCHIATRIC:  NEGATIVE for changes in mood or affect       Review of Systems:   Review of Systems            Physical Exam:     Vitals:    05/13/19 1119   BP: 136/86   BP Location: Right arm   Patient Position: Chair   Cuff Size: Adult Regular   Pulse: 92   Temp: 98.6  F (37  C)   TempSrc: Tympanic   SpO2: 93%   Weight: 62.5 kg (137 lb 12.8 oz)     Body mass index is 24.41 kg/m .  Vitals were reviewed   Physical Exam  GENERAL APPEARANCE: alert, comfortable appearing  EYES: Eyes grossly normal to inspection  HENT:  nose no rhinorrhea  RESP: lungs clear to auscultation - no rales, rhonchi or wheezes and no resp distress  CV: regular rates and rhythm, normal S1 S2,no murmur   ABDOMEN: soft, non-tender  SKIN: no rashes, no jaundice, no obvious masses.   NEURO:  no tremor  MENTAL STATUS EXAM:  Appearance/Behavior:No apparent distress  Speech: Normal  Mood/Affect: normal affect  Insight: Adequate      Results:    Results from the last 24 hours  Results for orders placed or performed in visit on 05/13/19 (from the past 24 hour(s))   Urine Drugs of Abuse Screen (Tox13)   Result Value Ref Range    Cannabinoids (06-jav-9-carboxy-9-THC) Not Detected NDET^Not Detected ng/mL    Phencyclidine (Phencyclidine) Not Detected NDET^Not Detected ng/mL    Cocaine (Benzoylecgonine) Not Detected NDET^Not Detected ng/mL    Methamphetamine (d-Methamphetamine) Not Detected NDET^Not Detected ng/mL    Opiates (Morphine) Not Detected NDET^Not Detected ng/mL    Amphetamine (d-Amphetamine) Not Detected NDET^Not Detected ng/mL    Benzodiazepines (Nordiazepam) Not Detected NDET^Not Detected ng/mL    Tricyclic Antidepressants (Desipramine) Not Detected NDET^Not Detected ng/mL    Methadone (Methadone) Not Detected NDET^Not Detected ng/mL    Barbiturates (Butalbital) Not Detected NDET^Not Detected ng/mL    Oxycodone (Oxycodone) Not Detected NDET^Not Detected ng/mL    Propoxyphene (Norpropoxyphene) Not Detected NDET^Not Detected ng/mL    Buprenorphine (Buprenorphine)  Detected, Abnormal Result (A) NDET^Not Detected ng/mL       Assessment and Plan     Was naloxone nasal spray prescribed? No Details: has some at home.  1. Opioid use disorder (H)  Still some cravings; will increase dose in the AM to 4mg, leave PM at 2 mg  - Urine Drugs of Abuse Screen (Tox13)  - buprenorphine HCl-naloxone HCl (SUBOXONE) 2-0.5 MG per film; Place 1 Film under the tongue daily In the PM  Dispense: 4 each; Refill: 0  - buprenorphine HCl-naloxone HCl (SUBOXONE) 4-1 MG per film; Place 1 Film under the tongue daily In the AM  Dispense: 4 each; Refill: 0    Dosage will need adjustment today.   See above.      Follow up Plan  Induction Stage (Days): Please make a clinic appointment for follow up with me (TABITHA STATON) or another Suboxone provider in 4 day(s) for suboxone follow up.    Greater than 15 minutes was spent with this patient, over half of which was on counseling and coordination of care which includes importance of recovery activities,which may include  attendance at NA/AA meetings, sober support network, and the importance of not isolating, being open, honest, and willing to change, possible triggers and how to avoid them, and managing cravings.    Medications Discontinued During This Encounter   Medication Reason     buprenorphine HCl-naloxone HCl (SUBOXONE) 2-0.5 MG per film Reorder       Options for treatment and follow-up care were reviewed with the patient. Brissa engaged in the decision making process and verbalized understanding of the options discussed and agreed with the final plan.    Tabitha Staton MD

## 2019-05-10 NOTE — NURSING NOTE
"Chief Complaint   Patient presents with     Drug Problem       Initial /70   Pulse 75   Temp 98.3  F (36.8  C)   Ht 1.6 m (5' 3\")   Wt 62.1 kg (137 lb)   SpO2 99%   BMI 24.27 kg/m   Estimated body mass index is 24.27 kg/m  as calculated from the following:    Height as of this encounter: 1.6 m (5' 3\").    Weight as of this encounter: 62.1 kg (137 lb).  Medication Reconciliation: complete    Corbin Valencia LPN  "
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
## 2019-05-11 ASSESSMENT — ANXIETY QUESTIONNAIRES: GAD7 TOTAL SCORE: 6

## 2019-05-13 ENCOUNTER — PATIENT OUTREACH (OUTPATIENT)
Dept: CARE COORDINATION | Facility: OTHER | Age: 39
End: 2019-05-13

## 2019-05-13 ENCOUNTER — OFFICE VISIT (OUTPATIENT)
Dept: FAMILY MEDICINE | Facility: OTHER | Age: 39
End: 2019-05-13
Attending: FAMILY MEDICINE
Payer: COMMERCIAL

## 2019-05-13 ENCOUNTER — TRANSFERRED RECORDS (OUTPATIENT)
Dept: HEALTH INFORMATION MANAGEMENT | Facility: CLINIC | Age: 39
End: 2019-05-13

## 2019-05-13 VITALS
OXYGEN SATURATION: 93 % | TEMPERATURE: 98.6 F | WEIGHT: 137.8 LBS | DIASTOLIC BLOOD PRESSURE: 86 MMHG | SYSTOLIC BLOOD PRESSURE: 136 MMHG | HEART RATE: 92 BPM | BODY MASS INDEX: 24.41 KG/M2

## 2019-05-13 DIAGNOSIS — F11.90 OPIOID USE DISORDER: Primary | ICD-10-CM

## 2019-05-13 LAB
AMPHETAMINES UR QL: NOT DETECTED NG/ML
BARBITURATES UR QL SCN: NOT DETECTED NG/ML
BENZODIAZ UR QL SCN: NOT DETECTED NG/ML
BUPRENORPHINE UR QL: ABNORMAL NG/ML
CANNABINOIDS UR QL: NOT DETECTED NG/ML
COCAINE UR QL SCN: NOT DETECTED NG/ML
D-METHAMPHET UR QL: NOT DETECTED NG/ML
HAV IGG SER QL IA: NONREACTIVE
HBV CORE AB SERPL QL IA: NONREACTIVE
HBV SURFACE AG SERPL QL IA: NONREACTIVE
HCV AB SERPL QL IA: NONREACTIVE
HIV 1+2 AB+HIV1 P24 AG SERPL QL IA: NONREACTIVE
METHADONE UR QL SCN: NOT DETECTED NG/ML
OPIATES UR QL SCN: NOT DETECTED NG/ML
OXYCODONE UR QL SCN: NOT DETECTED NG/ML
PCP UR QL SCN: NOT DETECTED NG/ML
PROPOXYPH UR QL: NOT DETECTED NG/ML
TRICYCLICS UR QL SCN: NOT DETECTED NG/ML

## 2019-05-13 PROCEDURE — G0463 HOSPITAL OUTPT CLINIC VISIT: HCPCS

## 2019-05-13 PROCEDURE — 99214 OFFICE O/P EST MOD 30 MIN: CPT | Performed by: FAMILY MEDICINE

## 2019-05-13 PROCEDURE — 80306 DRUG TEST PRSMV INSTRMNT: CPT | Mod: ZL | Performed by: FAMILY MEDICINE

## 2019-05-13 RX ORDER — BUPRENORPHINE AND NALOXONE 2; .5 MG/1; MG/1
1 FILM, SOLUBLE BUCCAL; SUBLINGUAL DAILY
Qty: 4 EACH | Refills: 0 | Status: SHIPPED | OUTPATIENT
Start: 2019-05-13 | End: 2019-05-17

## 2019-05-13 RX ORDER — BUPRENORPHINE AND NALOXONE 4; 1 MG/1; MG/1
1 FILM, SOLUBLE BUCCAL; SUBLINGUAL DAILY
Qty: 4 EACH | Refills: 0 | Status: SHIPPED | OUTPATIENT
Start: 2019-05-13 | End: 2019-05-17

## 2019-05-13 ASSESSMENT — ANXIETY QUESTIONNAIRES
2. NOT BEING ABLE TO STOP OR CONTROL WORRYING: NOT AT ALL
5. BEING SO RESTLESS THAT IT IS HARD TO SIT STILL: NOT AT ALL
GAD7 TOTAL SCORE: 0
IF YOU CHECKED OFF ANY PROBLEMS ON THIS QUESTIONNAIRE, HOW DIFFICULT HAVE THESE PROBLEMS MADE IT FOR YOU TO DO YOUR WORK, TAKE CARE OF THINGS AT HOME, OR GET ALONG WITH OTHER PEOPLE: NOT DIFFICULT AT ALL
4. TROUBLE RELAXING: NOT AT ALL
7. FEELING AFRAID AS IF SOMETHING AWFUL MIGHT HAPPEN: NOT AT ALL
1. FEELING NERVOUS, ANXIOUS, OR ON EDGE: NOT AT ALL
6. BECOMING EASILY ANNOYED OR IRRITABLE: NOT AT ALL
3. WORRYING TOO MUCH ABOUT DIFFERENT THINGS: NOT AT ALL

## 2019-05-13 ASSESSMENT — PATIENT HEALTH QUESTIONNAIRE - PHQ9: SUM OF ALL RESPONSES TO PHQ QUESTIONS 1-9: 0

## 2019-05-13 ASSESSMENT — PAIN SCALES - GENERAL: PAINLEVEL: NO PAIN (0)

## 2019-05-13 ASSESSMENT — ACTIVITIES OF DAILY LIVING (ADL): DEPENDENT_IADLS:: INDEPENDENT

## 2019-05-13 NOTE — PROGRESS NOTES
Clinic Care Coordination Contact  Care Team Conversations    CC attended office visit with pt and Dr. Stack this date.  Please refer to Dr. Stack's note for plan of care.  Encouraged pt to call CC with any questions/concerns/problems.  Commended her on her commitment to Recovery.  She was grateful for this.    Vera Robert RN-BSN  Chronic Pain Care Coordinator  [phone] opt. #[phone]

## 2019-05-13 NOTE — NURSING NOTE
"Chief Complaint   Patient presents with     Medication Therapy Management       Initial /86 (BP Location: Right arm, Patient Position: Chair, Cuff Size: Adult Regular)   Pulse 92   Temp 98.6  F (37  C) (Tympanic)   Wt 62.5 kg (137 lb 12.8 oz)   SpO2 93%   BMI 24.41 kg/m   Estimated body mass index is 24.41 kg/m  as calculated from the following:    Height as of 5/10/19: 1.6 m (5' 3\").    Weight as of this encounter: 62.5 kg (137 lb 12.8 oz).  Medication Reconciliation: complete    Lynsey Ramos LPN    "

## 2019-05-13 NOTE — LETTER
My Depression Action Plan  Name: Brissa Santana   Date of Birth 1980  Date: 5/13/2019    My doctor: Abhilash Philip   My clinic: [facility] [place]  [place] [address]  [phone]          GREEN    ZONE   Good Control    What it looks like:     Things are going generally well. You have normal up s and down s. You may even feel depressed from time to time, but bad moods usually last less than a day.   What you need to do:  1. Continue to care for yourself (see self care plan)  2. Check your depression survival kit and update it as needed  3. Follow your physician s recommendations including any medication.  4. Do not stop taking medication unless you consult with your physician first.           YELLOW         ZONE Getting Worse    What it looks like:     Depression is starting to interfere with your life.     It may be hard to get out of bed; you may be starting to isolate yourself from others.    Symptoms of depression are starting to last most all day and this has happened for several days.     You may have suicidal thoughts but they are not constant.   What you need to do:     1. Call your care team, your response to treatment will improve if you keep your care team informed of your progress. Yellow periods are signs an adjustment may need to be made.     2. Continue your self-care, even if you have to fake it!    3. Talk to someone in your support network    4. Open up your depression survival kit           RED    ZONE Medical Alert - Get Help    What it looks like:     Depression is seriously interfering with your life.     You may experience these or other symptoms: You can t get out of bed most days, can t work or engage in other necessary activities, you have trouble taking care of basic hygiene, or basic responsibilities, thoughts of suicide or death that will not go away, self-injurious behavior.     What you need to do:  1. Call your care team and  request a same-day appointment. If they are not available (weekends or after hours) call your local crisis line, emergency room or 911.            Depression Self Care Plan / Survival Kit    Self-Care for Depression  Here s the deal. Your body and mind are really not as separate as most people think.  What you do and think affects how you feel and how you feel influences what you do and think. This means if you do things that people who feel good do, it will help you feel better.  Sometimes this is all it takes.  There is also a place for medication and therapy depending on how severe your depression is, so be sure to consult with your medical provider and/ or Behavioral Health Consultant if your symptoms are worsening or not improving.     In order to better manage my stress, I will:    Exercise  Get some form of exercise, every day. This will help reduce pain and release endorphins, the  feel good  chemicals in your brain. This is almost as good as taking antidepressants!  This is not the same as joining a gym and then never going! (they count on that by the way ) It can be as simple as just going for a walk or doing some gardening, anything that will get you moving.      Hygiene   Maintain good hygiene (Get out of bed in the morning, Make your bed, Brush your teeth, Take a shower, and Get dressed like you were going to work, even if you are unemployed).  If your clothes don't fit try to get ones that do.    Diet  I will strive to eat foods that are good for me, drink plenty of water, and avoid excessive sugar, caffeine, alcohol, and other mood-altering substances.  Some foods that are helpful in depression are: complex carbohydrates, B vitamins, flaxseed, fish or fish oil, fresh fruits and vegetables.    Psychotherapy  I agree to participate in Individual Therapy (if recommended).    Medication  If prescribed medications, I agree to take them.  Missing doses can result in serious side effects.  I understand that  drinking alcohol, or other illicit drug use, may cause potential side effects.  I will not stop my medication abruptly without first discussing it with my provider.    Staying Connected With Others  I will stay in touch with my friends, family members, and my primary care provider/team.    Use your imagination  Be creative.  We all have a creative side; it doesn t matter if it s oil painting, sand castles, or mud pies! This will also kick up the endorphins.    Witness Beauty  (AKA stop and smell the roses) Take a look outside, even in mid-winter. Notice colors, textures. Watch the squirrels and birds.     Service to others  Be of service to others.  There is always someone else in need.  By helping others we can  get out of ourselves  and remember the really important things.  This also provides opportunities for practicing all the other parts of the program.    Humor  Laugh and be silly!  Adjust your TV habits for less news and crime-drama and more comedy.    Control your stress  Try breathing deep, massage therapy, biofeedback, and meditation. Find time to relax each day.     My support system    Clinic Contact:  Phone number:    Contact 1:  Phone number:    Contact 2:  Phone number:    Worship/:  Phone number:    Therapist:  Phone number:    Local crisis center:    Phone number:    Other community support:  Phone number:

## 2019-05-14 ASSESSMENT — ANXIETY QUESTIONNAIRES: GAD7 TOTAL SCORE: 0

## 2019-05-17 ENCOUNTER — OFFICE VISIT (OUTPATIENT)
Dept: FAMILY MEDICINE | Facility: OTHER | Age: 39
End: 2019-05-17
Attending: FAMILY MEDICINE
Payer: COMMERCIAL

## 2019-05-17 ENCOUNTER — PATIENT OUTREACH (OUTPATIENT)
Dept: CARE COORDINATION | Facility: OTHER | Age: 39
End: 2019-05-17

## 2019-05-17 VITALS
TEMPERATURE: 97.4 F | OXYGEN SATURATION: 97 % | BODY MASS INDEX: 25.05 KG/M2 | WEIGHT: 141.4 LBS | SYSTOLIC BLOOD PRESSURE: 126 MMHG | DIASTOLIC BLOOD PRESSURE: 76 MMHG | HEART RATE: 89 BPM

## 2019-05-17 DIAGNOSIS — F11.90 OPIOID USE DISORDER: Primary | ICD-10-CM

## 2019-05-17 LAB
AMPHETAMINES UR QL: NOT DETECTED NG/ML
BARBITURATES UR QL SCN: NOT DETECTED NG/ML
BENZODIAZ UR QL SCN: NOT DETECTED NG/ML
BUPRENORPHINE UR QL: ABNORMAL NG/ML
CANNABINOIDS UR QL: NOT DETECTED NG/ML
COCAINE UR QL SCN: NOT DETECTED NG/ML
D-METHAMPHET UR QL: NOT DETECTED NG/ML
METHADONE UR QL SCN: NOT DETECTED NG/ML
OPIATES UR QL SCN: NOT DETECTED NG/ML
OXYCODONE UR QL SCN: NOT DETECTED NG/ML
PCP UR QL SCN: NOT DETECTED NG/ML
PROPOXYPH UR QL: NOT DETECTED NG/ML
TRICYCLICS UR QL SCN: NOT DETECTED NG/ML

## 2019-05-17 PROCEDURE — 99214 OFFICE O/P EST MOD 30 MIN: CPT | Performed by: FAMILY MEDICINE

## 2019-05-17 PROCEDURE — 80306 DRUG TEST PRSMV INSTRMNT: CPT | Mod: ZL | Performed by: FAMILY MEDICINE

## 2019-05-17 PROCEDURE — G0463 HOSPITAL OUTPT CLINIC VISIT: HCPCS

## 2019-05-17 RX ORDER — BUPRENORPHINE AND NALOXONE 4; 1 MG/1; MG/1
1 FILM, SOLUBLE BUCCAL; SUBLINGUAL 2 TIMES DAILY
Qty: 14 EACH | Refills: 0 | Status: SHIPPED | OUTPATIENT
Start: 2019-05-17 | End: 2019-05-24

## 2019-05-17 ASSESSMENT — ACTIVITIES OF DAILY LIVING (ADL): DEPENDENT_IADLS:: INDEPENDENT

## 2019-05-17 ASSESSMENT — ANXIETY QUESTIONNAIRES
2. NOT BEING ABLE TO STOP OR CONTROL WORRYING: SEVERAL DAYS
7. FEELING AFRAID AS IF SOMETHING AWFUL MIGHT HAPPEN: NOT AT ALL
4. TROUBLE RELAXING: NOT AT ALL
IF YOU CHECKED OFF ANY PROBLEMS ON THIS QUESTIONNAIRE, HOW DIFFICULT HAVE THESE PROBLEMS MADE IT FOR YOU TO DO YOUR WORK, TAKE CARE OF THINGS AT HOME, OR GET ALONG WITH OTHER PEOPLE: NOT DIFFICULT AT ALL
5. BEING SO RESTLESS THAT IT IS HARD TO SIT STILL: NOT AT ALL
6. BECOMING EASILY ANNOYED OR IRRITABLE: NOT AT ALL
3. WORRYING TOO MUCH ABOUT DIFFERENT THINGS: NOT AT ALL
GAD7 TOTAL SCORE: 1
1. FEELING NERVOUS, ANXIOUS, OR ON EDGE: NOT AT ALL

## 2019-05-17 ASSESSMENT — PAIN SCALES - GENERAL: PAINLEVEL: NO PAIN (0)

## 2019-05-17 NOTE — NURSING NOTE
"Chief Complaint   Patient presents with     Medication Therapy Management       Initial /76 (BP Location: Right arm, Patient Position: Chair, Cuff Size: Adult Regular)   Pulse 89   Temp 97.4  F (36.3  C) (Tympanic)   Wt 64.1 kg (141 lb 6.4 oz)   SpO2 97%   BMI 25.05 kg/m   Estimated body mass index is 25.05 kg/m  as calculated from the following:    Height as of 5/10/19: 1.6 m (5' 3\").    Weight as of this encounter: 64.1 kg (141 lb 6.4 oz).  Medication Reconciliation: complete    Lynsey Ramos LPN    "

## 2019-05-17 NOTE — PROGRESS NOTES
Follow-up Buprenorphine Visit           HPI       Brissa Santana is here for f/u on buprenophine/naloxone (Suboxone) therapy for opioid use disorder.      Brissa is currently taking 4/1 mg and 2/0.5mg of Buprenorphine/Naloxone  2 times daily.  This dosage is working to keep cravings at a manageable level. Aside from this, Brissa is currently participating in the following recovery activities went to a group meeting at [employer] last Wednesday.  Found it very  Helpful.  19 days sober today.  Is looking at starting outpatient treatment at [facility].  Minimal cravings.  Reports more at night.  Would like to increase to 4mg BID.        Other medical concerns: No      Any ED visits? No     Withdrawal Symptoms   Fevers or chills? No   Abdominal pain, nausea, vomiting, diarrhea? No   Depression or anxiety?:No  Reports her mood has been stable.  Little worrying at times, but nothing major.  Reports upbeat mood.    Substance Use  Any opioids other than suboxone? No   Any alcohol use? No   Any other recreational drug use? No     History   Smoking Status     Former Smoker     Packs/day: 1.00     Years: 15.00     Types: Cigarettes     Quit date: 3/11/2017   Smokeless Tobacco     Never Used     Comment: Tried to quit (yes); longest period tobacco-free- 9 months       Problem, Medication and Allergy Lists were reviewed and updated if needed.  reviewed and are current..    Pharmacy Database reviewed? Yes, 5/17/19, as expected.    Patient is an established patient of this clinic..         Review of Systems:   Review of Systems  CONSTITUTIONAL: NEGATIVE for fever, chills, change in weight  INTEGUMENTARY/SKIN: NEGATIVE for worrisome rashes, moles or lesions  EYES: NEGATIVE for vision changes or irritation  ENT/MOUTH: NEGATIVE for ear, mouth and throat problems  RESP: NEGATIVE for significant cough or SOB  CV: NEGATIVE for chest pain, palpitations or peripheral edema  GI: NEGATIVE for nausea,  abdominal pain, heartburn, or change in bowel habits  MUSCULOSKELETAL: NEGATIVE for significant arthralgias or myalgia  NEURO: NEGATIVE for weakness, dizziness or paresthesias  PSYCHIATRIC: NEGATIVE for changes in mood or affect          Physical Exam:     Vitals:    05/17/19 1013   BP: 126/76   BP Location: Right arm   Patient Position: Chair   Cuff Size: Adult Regular   Pulse: 89   Temp: 97.4  F (36.3  C)   TempSrc: Tympanic   SpO2: 97%   Weight: 64.1 kg (141 lb 6.4 oz)     Body mass index is 25.05 kg/m .  Vitals were reviewed   Physical Exam  GENERAL APPEARANCE: alert, comfortable appearing  EYES: Eyes grossly normal to inspection  HENT:  nose no rhinorrhea  RESP: lungs clear to auscultation - no rales, rhonchi or wheezes and no resp distress  CV: regular rates and rhythm, normal S1 S2,no murmur   ABDOMEN: soft, non-tender  SKIN: no rashes, no jaundice, no obvious masses.   NEURO:  no tremor  MENTAL STATUS EXAM:  Appearance/Behavior:No apparent distress  Speech: Normal  Mood/Affect: normal affect  Insight: Adequate      Results:    Results from the last 24 hours  Results for orders placed or performed in visit on 05/17/19 (from the past 24 hour(s))   Urine Drugs of Abuse Screen (Tox13)   Result Value Ref Range    Cannabinoids (44-scj-3-carboxy-9-THC) Not Detected NDET^Not Detected ng/mL    Phencyclidine (Phencyclidine) Not Detected NDET^Not Detected ng/mL    Cocaine (Benzoylecgonine) Not Detected NDET^Not Detected ng/mL    Methamphetamine (d-Methamphetamine) Not Detected NDET^Not Detected ng/mL    Opiates (Morphine) Not Detected NDET^Not Detected ng/mL    Amphetamine (d-Amphetamine) Not Detected NDET^Not Detected ng/mL    Benzodiazepines (Nordiazepam) Not Detected NDET^Not Detected ng/mL    Tricyclic Antidepressants (Desipramine) Not Detected NDET^Not Detected ng/mL    Methadone (Methadone) Not Detected NDET^Not Detected ng/mL    Barbiturates (Butalbital) Not Detected NDET^Not Detected ng/mL    Oxycodone  (Oxycodone) Not Detected NDET^Not Detected ng/mL    Propoxyphene (Norpropoxyphene) Not Detected NDET^Not Detected ng/mL    Buprenorphine (Buprenorphine) Detected, Abnormal Result (A) NDET^Not Detected ng/mL       Assessment and Plan     Was naloxone nasal spray prescribed? No Details: has some at home.  1. Opioid use disorder (H)    - Urine Drugs of Abuse Screen (Tox13)  - buprenorphine HCl-naloxone HCl (SUBOXONE) 4-1 MG per film; Place 1 Film under the tongue 2 times daily In the AM  Dispense: 14 each; Refill: 0    Dosage will need adjustment today.  Increase to  4/2 mg  2 time(s) a day of  buprenorphine/naloxone (Suboxone).     Follow up Plan  Stage 1(1 week): Please make a clinic appointment for follow up with me (TABITHA STATON) or another Suboxone provider in 1 week for suboxone follow up.    Greater than 15 minutes was spent with this patient, over half of which was on counseling and coordination of care which includes importance of recovery activities,which may include  attendance at NA/AA meetings, sober support network, and the importance of not isolating, being open, honest, and willing to change, possible triggers and how to avoid them, and managing cravings.    Medications Discontinued During This Encounter   Medication Reason     buprenorphine HCl-naloxone HCl (SUBOXONE) 2-0.5 MG per film      buprenorphine HCl-naloxone HCl (SUBOXONE) 4-1 MG per film Reorder       Options for treatment and follow-up care were reviewed with the patient. Brissa engaged in the decision making process and verbalized understanding of the options discussed and agreed with the final plan.    Tabitha Staton MD

## 2019-05-17 NOTE — PROGRESS NOTES
Clinic Care Coordination Contact  Care Team Conversations    Care Coordinator attended office visit with pt and Dr. Stack this date.  Please refer to Dr. Stack's note for plan of care.  Pt doing very well.  Going out more with family, which she has not done in quite some time.  Feels dose is adequate, cravings increase at night.  Would like to try increasing dose to 4mg BID.  Commended pt on her commitment to recovery.  Did inquire if she would like to start looking for a job in the future.  She is looking forward to this.  Right now it is difficult due to no drivers license.  She is working on getting that back.  Overall, no complaints this date.  Encouraged her to call CC with any questions/concerns/problems.  Verbalized understanding.    Vera Robert RN-BSN  Chronic Pain Care Coordinator  [phone] opt. #3

## 2019-05-17 NOTE — LETTER
My Depression Action Plan  Name: Brissa Santana   Date of Birth 1980  Date: 5/17/2019    My doctor: Abhilash Philip   My clinic: [facility] [place]  [place] [address]  [phone]          GREEN    ZONE   Good Control    What it looks like:     Things are going generally well. You have normal up s and down s. You may even feel depressed from time to time, but bad moods usually last less than a day.   What you need to do:  1. Continue to care for yourself (see self care plan)  2. Check your depression survival kit and update it as needed  3. Follow your physician s recommendations including any medication.  4. Do not stop taking medication unless you consult with your physician first.           YELLOW         ZONE Getting Worse    What it looks like:     Depression is starting to interfere with your life.     It may be hard to get out of bed; you may be starting to isolate yourself from others.    Symptoms of depression are starting to last most all day and this has happened for several days.     You may have suicidal thoughts but they are not constant.   What you need to do:     1. Call your care team, your response to treatment will improve if you keep your care team informed of your progress. Yellow periods are signs an adjustment may need to be made.     2. Continue your self-care, even if you have to fake it!    3. Talk to someone in your support network    4. Open up your depression survival kit           RED    ZONE Medical Alert - Get Help    What it looks like:     Depression is seriously interfering with your life.     You may experience these or other symptoms: You can t get out of bed most days, can t work or engage in other necessary activities, you have trouble taking care of basic hygiene, or basic responsibilities, thoughts of suicide or death that will not go away, self-injurious behavior.     What you need to do:  1. Call your care team and  request a same-day appointment. If they are not available (weekends or after hours) call your local crisis line, emergency room or 911.            Depression Self Care Plan / Survival Kit    Self-Care for Depression  Here s the deal. Your body and mind are really not as separate as most people think.  What you do and think affects how you feel and how you feel influences what you do and think. This means if you do things that people who feel good do, it will help you feel better.  Sometimes this is all it takes.  There is also a place for medication and therapy depending on how severe your depression is, so be sure to consult with your medical provider and/ or Behavioral Health Consultant if your symptoms are worsening or not improving.     In order to better manage my stress, I will:    Exercise  Get some form of exercise, every day. This will help reduce pain and release endorphins, the  feel good  chemicals in your brain. This is almost as good as taking antidepressants!  This is not the same as joining a gym and then never going! (they count on that by the way ) It can be as simple as just going for a walk or doing some gardening, anything that will get you moving.      Hygiene   Maintain good hygiene (Get out of bed in the morning, Make your bed, Brush your teeth, Take a shower, and Get dressed like you were going to work, even if you are unemployed).  If your clothes don't fit try to get ones that do.    Diet  I will strive to eat foods that are good for me, drink plenty of water, and avoid excessive sugar, caffeine, alcohol, and other mood-altering substances.  Some foods that are helpful in depression are: complex carbohydrates, B vitamins, flaxseed, fish or fish oil, fresh fruits and vegetables.    Psychotherapy  I agree to participate in Individual Therapy (if recommended).    Medication  If prescribed medications, I agree to take them.  Missing doses can result in serious side effects.  I understand that  drinking alcohol, or other illicit drug use, may cause potential side effects.  I will not stop my medication abruptly without first discussing it with my provider.    Staying Connected With Others  I will stay in touch with my friends, family members, and my primary care provider/team.    Use your imagination  Be creative.  We all have a creative side; it doesn t matter if it s oil painting, sand castles, or mud pies! This will also kick up the endorphins.    Witness Beauty  (AKA stop and smell the roses) Take a look outside, even in mid-winter. Notice colors, textures. Watch the squirrels and birds.     Service to others  Be of service to others.  There is always someone else in need.  By helping others we can  get out of ourselves  and remember the really important things.  This also provides opportunities for practicing all the other parts of the program.    Humor  Laugh and be silly!  Adjust your TV habits for less news and crime-drama and more comedy.    Control your stress  Try breathing deep, massage therapy, biofeedback, and meditation. Find time to relax each day.     My support system    Clinic Contact:  Phone number:    Contact 1:  Phone number:    Contact 2:  Phone number:    Rastafari/:  Phone number:    Therapist:  Phone number:    Local crisis center:    Phone number:    Other community support:  Phone number:

## 2019-05-18 ASSESSMENT — ANXIETY QUESTIONNAIRES: GAD7 TOTAL SCORE: 1

## 2019-05-23 NOTE — PROGRESS NOTES
Follow-up Buprenorphine Visit           HPI       Brissa Santana is here for f/u on buprenophine/naloxone (Suboxone) therapy for opioid use disorder.  Medication Therapy Management      Brissa is currently taking 4/2 mg of Buprenorphine/Naloxone 2 times daily. This dosage is working to keep cravings at a manageable level. Aside from this, Brissa is currently participating in the following recovery activities attendance at NA/AA meetings various times a month.    Dose was increase from 4 mg AM and 2 mg PM to 4 mg BID last week.  25 days sober.  No use.   Cravings are less, but improving.  Still doing meetings. NA meetings.  Did sit in on class with Stig.  Has URI currently, so energy less.  Gone on walks with kids.  Goes on rides with Mark - who is 9 months sober.  1 friend who uses - but not around her.  No cravings for her.   Ex lives 5 blocks down.  Involved.  17 years together, 4 kids together.    Other medical concerns: No      Any ED visits? No     Withdrawal Symptoms   Fevers or chills? No   Abdominal pain, nausea, vomiting, diarrhea? No   Depression or anxiety?:No     Substance Use  Any opioids other than suboxone? No   Any alcohol use? No   Any other recreational drug use? No     History   Smoking Status     Former Smoker     Packs/day: 1.00     Years: 15.00     Types: Cigarettes     Quit date: 3/11/2017   Smokeless Tobacco     Never Used     Comment: Tried to quit (yes); longest period tobacco-free- 9 months       Problem, Medication and Allergy Lists were reviewed and updated if needed.  reviewed and are current..    Pharmacy Database reviewed? Yes, 5/24/19, as expected.    Patient is an established patient of this clinic..         Review of Systems:   Review of Systems  CONSTITUTIONAL: NEGATIVE for fever, chills, change in weight  INTEGUMENTARY/SKIN: NEGATIVE for worrisome rashes, moles or lesions  EYES: NEGATIVE for vision changes or irritation  ENT/MOUTH: NEGATIVE for ear, mouth and throat  problems  RESP: NEGATIVE for significant cough or SOB  CV: NEGATIVE for chest pain, palpitations or peripheral edema  GI: NEGATIVE for nausea, abdominal pain, heartburn, or change in bowel habits  : NEGATIVE for frequency, dysuria, or hematuria  MUSCULOSKELETAL: NEGATIVE for significant arthralgias or myalgia  NEURO: NEGATIVE for weakness, dizziness or paresthesias  PSYCHIATRIC: NEGATIVE for changes in mood or affect          Physical Exam:     Vitals:    05/24/19 1340   BP: 132/66   BP Location: Right arm   Patient Position: Chair   Cuff Size: Adult Regular   Pulse: 91   Temp: 99.1  F (37.3  C)   TempSrc: Tympanic   SpO2: 98%   Weight: 65.9 kg (145 lb 3.2 oz)     Body mass index is 25.72 kg/m .  Vitals were reviewed and were normal   Physical Exam  GENERAL APPEARANCE: alert, comfortable appearing  EYES: Eyes grossly normal to inspection  HENT:  nose no rhinorrhea  RESP: lungs clear to auscultation - no rales, rhonchi or wheezes and no resp distress  CV: regular rates and rhythm, normal S1 S2,no murmur   SKIN: no rashes, no jaundice, no obvious masses.   NEURO:  no tremor  MENTAL STATUS EXAM:  Appearance/Behavior:No apparent distress  Speech: Normal  Mood/Affect: normal affect  Insight: Adequate      Results:    Results from the last 24 hours  Results for orders placed or performed in visit on 05/24/19 (from the past 24 hour(s))   Urine Drugs of Abuse Screen (Tox13)   Result Value Ref Range    Cannabinoids (15-pee-0-carboxy-9-THC) Not Detected NDET^Not Detected ng/mL    Phencyclidine (Phencyclidine) Not Detected NDET^Not Detected ng/mL    Cocaine (Benzoylecgonine) Not Detected NDET^Not Detected ng/mL    Methamphetamine (d-Methamphetamine) Not Detected NDET^Not Detected ng/mL    Opiates (Morphine) Not Detected NDET^Not Detected ng/mL    Amphetamine (d-Amphetamine) Not Detected NDET^Not Detected ng/mL    Benzodiazepines (Nordiazepam) Not Detected NDET^Not Detected ng/mL    Tricyclic Antidepressants (Desipramine) Not  Detected NDET^Not Detected ng/mL    Methadone (Methadone) Not Detected NDET^Not Detected ng/mL    Barbiturates (Butalbital) Not Detected NDET^Not Detected ng/mL    Oxycodone (Oxycodone) Not Detected NDET^Not Detected ng/mL    Propoxyphene (Norpropoxyphene) Not Detected NDET^Not Detected ng/mL    Buprenorphine (Buprenorphine) Detected, Abnormal Result (A) NDET^Not Detected ng/mL       Assessment and Plan     Was naloxone nasal spray prescribed? Yes Details: done prior.  1. Opioid use disorder (H)  Doing well.  Would like to continue at current dose.  Discussed limiting/avoiding triggers where able - routes she drives, people she hangs around with.  - Urine Drugs of Abuse Screen (Tox13)  - buprenorphine HCl-naloxone HCl (SUBOXONE) 4-1 MG per film; Place 1 Film under the tongue 2 times daily  Dispense: 28 each; Refill: 0    Dosage will not need adjustment today.  Continue at 4 mg  2 time(s) a day of  buprenorphine/naloxone (Suboxone).     Follow up Plan  Stage 2 (2 weeks): Please make a clinic appointment for follow up with me (TABITHA STATON) or another Suboxone provider in 2 weeks for suboxone follow up.    Greater than 15 minutes was spent with this patient, over half of which was on counseling and coordination of care which includes importance of recovery activities,which may include  attendance at NA/AA meetings, sober support network, and the importance of not isolating, being open, honest, and willing to change, possible triggers and how to avoid them, and managing cravings.    Medications Discontinued During This Encounter   Medication Reason     buprenorphine HCl-naloxone HCl (SUBOXONE) 4-1 MG per film Reorder       Options for treatment and follow-up care were reviewed with the patient. Brissa engaged in the decision making process and verbalized understanding of the options discussed and agreed with the final plan.    Tabitha Staton MD

## 2019-05-24 ENCOUNTER — PATIENT OUTREACH (OUTPATIENT)
Dept: CARE COORDINATION | Facility: OTHER | Age: 39
End: 2019-05-24

## 2019-05-24 ENCOUNTER — OFFICE VISIT (OUTPATIENT)
Dept: FAMILY MEDICINE | Facility: OTHER | Age: 39
End: 2019-05-24
Attending: FAMILY MEDICINE
Payer: COMMERCIAL

## 2019-05-24 VITALS
TEMPERATURE: 99.1 F | OXYGEN SATURATION: 98 % | DIASTOLIC BLOOD PRESSURE: 66 MMHG | WEIGHT: 145.2 LBS | BODY MASS INDEX: 25.72 KG/M2 | SYSTOLIC BLOOD PRESSURE: 132 MMHG | HEART RATE: 91 BPM

## 2019-05-24 DIAGNOSIS — F11.90 OPIOID USE DISORDER: Primary | ICD-10-CM

## 2019-05-24 PROCEDURE — 80306 DRUG TEST PRSMV INSTRMNT: CPT | Mod: ZL | Performed by: FAMILY MEDICINE

## 2019-05-24 PROCEDURE — 99213 OFFICE O/P EST LOW 20 MIN: CPT | Performed by: FAMILY MEDICINE

## 2019-05-24 PROCEDURE — G0463 HOSPITAL OUTPT CLINIC VISIT: HCPCS

## 2019-05-24 RX ORDER — BUPRENORPHINE AND NALOXONE 4; 1 MG/1; MG/1
1 FILM, SOLUBLE BUCCAL; SUBLINGUAL 2 TIMES DAILY
Qty: 28 EACH | Refills: 0 | Status: SHIPPED | OUTPATIENT
Start: 2019-05-24 | End: 2019-06-03

## 2019-05-24 ASSESSMENT — ACTIVITIES OF DAILY LIVING (ADL): DEPENDENT_IADLS:: INDEPENDENT

## 2019-05-24 ASSESSMENT — PAIN SCALES - GENERAL: PAINLEVEL: NO PAIN (0)

## 2019-05-24 NOTE — NURSING NOTE
"Chief Complaint   Patient presents with     Medication Therapy Management       Initial /66 (BP Location: Right arm, Patient Position: Chair, Cuff Size: Adult Regular)   Pulse 91   Temp 99.1  F (37.3  C) (Tympanic)   Wt 65.9 kg (145 lb 3.2 oz)   SpO2 98%   BMI 25.72 kg/m   Estimated body mass index is 25.72 kg/m  as calculated from the following:    Height as of 5/10/19: 1.6 m (5' 3\").    Weight as of this encounter: 65.9 kg (145 lb 3.2 oz).  Medication Reconciliation: complete    Lynsey Ramos LPN    "

## 2019-05-24 NOTE — PROGRESS NOTES
Clinic Care Coordination Contact  Care Team Conversations    CC attended office visit with pt and Dr. Stack this date.  Please refer to Dr. Stack's note for plan of care.  25 days sober today!  Doing very well.  Will schedule a 2 week follow up.  Encouraged her to call CC with any questions/concerns/problems.  Verbalized understanding.    Vera Robert RN-BSN  Chronic Pain Care Coordinator  [phone] opt. #3

## 2019-06-03 ENCOUNTER — TELEPHONE (OUTPATIENT)
Dept: FAMILY MEDICINE | Facility: OTHER | Age: 39
End: 2019-06-03

## 2019-06-03 ENCOUNTER — PATIENT OUTREACH (OUTPATIENT)
Dept: CARE COORDINATION | Facility: OTHER | Age: 39
End: 2019-06-03
Payer: COMMERCIAL

## 2019-06-03 ENCOUNTER — APPOINTMENT (OUTPATIENT)
Dept: LAB | Facility: OTHER | Age: 39
End: 2019-06-03
Attending: FAMILY MEDICINE
Payer: COMMERCIAL

## 2019-06-03 DIAGNOSIS — F11.90 OPIOID USE DISORDER: Primary | ICD-10-CM

## 2019-06-03 PROCEDURE — 80306 DRUG TEST PRSMV INSTRMNT: CPT | Performed by: FAMILY MEDICINE

## 2019-06-03 RX ORDER — BUPRENORPHINE AND NALOXONE 4; 1 MG/1; MG/1
1 FILM, SOLUBLE BUCCAL; SUBLINGUAL 2 TIMES DAILY
Qty: 6 EACH | Refills: 0 | Status: SHIPPED | OUTPATIENT
Start: 2019-06-03 | End: 2019-06-06

## 2019-06-03 NOTE — PROGRESS NOTES
RX brought to [facility].  Pt notified and also notified of lab results.    Vera Robert RN-BSN  Chronic Pain Care Coordinator  [phone] opt. #3

## 2019-06-03 NOTE — TELEPHONE ENCOUNTER
"Patient at registration desk. Reported the following. She was unable to  Suboxone script due to \"the pharmacy won't let me pay cash\". \"the doctor needs to change the dose, even if I can only buy one tablet\".   Pt reported she has one cell phone that works; however, can not recall the number. Pt advised to call back this afternoon and request to speak with Vera.   "

## 2019-06-03 NOTE — PROGRESS NOTES
As discussed in person a few minutes ago, will allow 1 pass.  She will need to come in for drug screen today.  If clear, can then get new script for supply to last until her appointment in 3 days.  She needs to be told that this can't happen again - needs to have system in place to be responsible for her medications.

## 2019-06-03 NOTE — PROGRESS NOTES
Clinic Care Coordination Contact  Care Team Conversations    CC received VM from pt this date.  Reports she has misplaced her Suboxone films.  She took them out of the bottle and has not been able to find them.  Has tore her home apart, looked everywhere she has been, and just can not find them.  She doesn't know what to due.  No Suboxone since Friday, May 31, 2019.  No illicit use per VM and no withdrawal as of yet.  States she thinks she can wait until her appt on 6.6.19, but wanted to be open and honest.  Dr. Stack, please advise.  Thank you.    Vera Robert, RN-BSN  Chronic Pain Care Coordinator  [phone] opt. #3

## 2019-06-03 NOTE — TELEPHONE ENCOUNTER
Received return phone call from Vidya.  RX taken care of.  Pt notified via telephone.    Vera Robert RN-BSN  Chronic Pain Care Coordinator  [phone] opt. #3

## 2019-06-05 NOTE — PROGRESS NOTES
Follow-up Buprenorphine Visit           HPI       Brissa Santana is here for f/u on buprenophine/naloxone (Suboxone) therapy for opioid use disorder.  Medication Therapy Management      Brissa is currently taking 4/2 mg of Buprenorphine/Naloxone 2 timesdaily. This dosage is working to keep cravings at a manageable level. Aside from this, Brissa is currently participating in the following recovery activities attendance at NA/AA meetings 4 times a month.    Is planning to work towards getting her 's license back.  Had prior DUI for pills/opiates.    Is considering volunteering at [employer] or the [employer] next to her home.    Is considering looking into job opportunities as well.    See recent phone call.  Did misplace a few days of her films.  Came in for drug screen and was given enough to last until this visit.  That day purchased a lock box for at home for her medications.    Significant other/ex - noticed how well she was doing recently - shared with him that she is on Suboxone.  He is supportive.    Other medical concerns: No    Any ED visits? No     Withdrawal Symptoms   Fevers or chills? No   Abdominal pain, nausea, vomiting, diarrhea? No   Depression or anxiety?:No     Substance Use  Any opioids other than suboxone? No   Any alcohol use? No   Any other recreational drug use? No     History   Smoking Status     Former Smoker     Packs/day: 1.00     Years: 15.00     Types: Cigarettes     Quit date: 3/11/2017   Smokeless Tobacco     Never Used     Comment: Tried to quit (yes); longest period tobacco-free- 9 months       Problem, Medication and Allergy Lists were reviewed and updated if needed.  reviewed and are current..    Pharmacy Database reviewed? Yes, 6/6/19, as expected.    Patient is an established patient of this clinic..         Review of Systems:   Review of Systems  CONSTITUTIONAL: NEGATIVE for fever, chills, change in weight  INTEGUMENTARY/SKIN: NEGATIVE for worrisome rashes  EYES:  NEGATIVE for vision changes or irritation  ENT/MOUTH: NEGATIVE for ear, mouth and throat problems  RESP: NEGATIVE for significant cough or SOB  CV: NEGATIVE for chest pain, palpitations or peripheral edema  GI: NEGATIVE for nausea, abdominal pain, heartburn, or change in bowel habits  : NEGATIVE for frequency, dysuria, or hematuria  MUSCULOSKELETAL: NEGATIVE for significant arthralgias or myalgia  NEURO: NEGATIVE for weakness, dizziness or paresthesias  ENDOCRINE: NEGATIVE for temperature intolerance, skin/hair changes  PSYCHIATRIC: NEGATIVE for changes in mood or affect          Physical Exam:     Vitals:    06/06/19 1403   BP: 128/82   BP Location: Left arm   Patient Position: Chair   Cuff Size: Adult Regular   Pulse: 108   Temp: 98.3  F (36.8  C)   TempSrc: Tympanic   SpO2: 96%   Weight: 65.7 kg (144 lb 12.8 oz)     Body mass index is 25.65 kg/m .  Vitals were reviewed   Physical Exam  GENERAL APPEARANCE: alert, comfortable appearing  EYES: Eyes grossly normal to inspection  HENT:  nose no rhinorrhea  RESP: lungs clear to auscultation - no rales, rhonchi or wheezes and no resp distress  CV: regular rates and rhythm, normal S1 S2,no murmur   ABDOMEN: soft, nontender  SKIN: no rashes, no jaundice, no obvious masses.   NEURO:  no tremor  MENTAL STATUS EXAM:  Appearance/Behavior:No apparent distress  Speech: Normal  Mood/Affect: normal affect  Insight: Adequate      Results:    Results from the last 24 hours  Results for orders placed or performed in visit on 06/06/19 (from the past 24 hour(s))   Urine Drugs of Abuse Screen (Tox13)   Result Value Ref Range    Cannabinoids (72-mfn-5-carboxy-9-THC) Not Detected NDET^Not Detected ng/mL    Phencyclidine (Phencyclidine) Not Detected NDET^Not Detected ng/mL    Cocaine (Benzoylecgonine) Not Detected NDET^Not Detected ng/mL    Methamphetamine (d-Methamphetamine) Not Detected NDET^Not Detected ng/mL    Opiates (Morphine) Not Detected NDET^Not Detected ng/mL    Amphetamine  (d-Amphetamine) Not Detected NDET^Not Detected ng/mL    Benzodiazepines (Nordiazepam) Not Detected NDET^Not Detected ng/mL    Tricyclic Antidepressants (Desipramine) Not Detected NDET^Not Detected ng/mL    Methadone (Methadone) Not Detected NDET^Not Detected ng/mL    Barbiturates (Butalbital) Not Detected NDET^Not Detected ng/mL    Oxycodone (Oxycodone) Not Detected NDET^Not Detected ng/mL    Propoxyphene (Norpropoxyphene) Not Detected NDET^Not Detected ng/mL    Buprenorphine (Buprenorphine) Detected, Abnormal Result (A) NDET^Not Detected ng/mL       Assessment and Plan     Was naloxone nasal spray prescribed? Yes Details: prior visit.  1. Opioid use disorder (H)  Doing well  - Urine Drugs of Abuse Screen (Tox13)  - buprenorphine HCl-naloxone HCl (SUBOXONE) 4-1 MG per film; Place 1 Film under the tongue 2 times daily  Dispense: 28 each; Refill: 0    Dosage will not need adjustment today.  Continue at 4 mg  2 time(s) a day of  buprenorphine/naloxone (Suboxone).     Follow up Plan  Stage 2 (2 weeks): Please make a clinic appointment for follow up with me (TABITHA STATON) or another Suboxone provider in 2 weeks for suboxone follow up.    Greater than 15 minutes was spent with this patient, over half of which was on counseling and coordination of care which includes importance of recovery activities,which may include  attendance at NA/AA meetings, sober support network, and the importance of not isolating, being open, honest, and willing to change, possible triggers and how to avoid them, and managing cravings.    Medications Discontinued During This Encounter   Medication Reason     buprenorphine HCl-naloxone HCl (SUBOXONE) 4-1 MG per film Reorder       Options for treatment and follow-up care were reviewed with the patient. Brissa engaged in the decision making process and verbalized understanding of the options discussed and agreed with the final plan.    Tabitha Staton MD

## 2019-06-06 ENCOUNTER — PATIENT OUTREACH (OUTPATIENT)
Dept: CARE COORDINATION | Facility: OTHER | Age: 39
End: 2019-06-06

## 2019-06-06 ENCOUNTER — OFFICE VISIT (OUTPATIENT)
Dept: FAMILY MEDICINE | Facility: OTHER | Age: 39
End: 2019-06-06
Attending: FAMILY MEDICINE
Payer: COMMERCIAL

## 2019-06-06 VITALS
WEIGHT: 144.8 LBS | BODY MASS INDEX: 25.65 KG/M2 | OXYGEN SATURATION: 96 % | SYSTOLIC BLOOD PRESSURE: 128 MMHG | DIASTOLIC BLOOD PRESSURE: 82 MMHG | TEMPERATURE: 98.3 F | HEART RATE: 108 BPM

## 2019-06-06 DIAGNOSIS — F11.90 OPIOID USE DISORDER: Primary | ICD-10-CM

## 2019-06-06 PROCEDURE — 99213 OFFICE O/P EST LOW 20 MIN: CPT | Performed by: FAMILY MEDICINE

## 2019-06-06 PROCEDURE — 80306 DRUG TEST PRSMV INSTRMNT: CPT | Mod: ZL | Performed by: FAMILY MEDICINE

## 2019-06-06 PROCEDURE — G0463 HOSPITAL OUTPT CLINIC VISIT: HCPCS

## 2019-06-06 RX ORDER — BUPRENORPHINE AND NALOXONE 4; 1 MG/1; MG/1
1 FILM, SOLUBLE BUCCAL; SUBLINGUAL 2 TIMES DAILY
Qty: 28 EACH | Refills: 0 | Status: SHIPPED | OUTPATIENT
Start: 2019-06-06 | End: 2019-06-20

## 2019-06-06 ASSESSMENT — ANXIETY QUESTIONNAIRES
6. BECOMING EASILY ANNOYED OR IRRITABLE: SEVERAL DAYS
7. FEELING AFRAID AS IF SOMETHING AWFUL MIGHT HAPPEN: NOT AT ALL
1. FEELING NERVOUS, ANXIOUS, OR ON EDGE: NOT AT ALL
GAD7 TOTAL SCORE: 3
4. TROUBLE RELAXING: NOT AT ALL
5. BEING SO RESTLESS THAT IT IS HARD TO SIT STILL: NOT AT ALL
2. NOT BEING ABLE TO STOP OR CONTROL WORRYING: SEVERAL DAYS
3. WORRYING TOO MUCH ABOUT DIFFERENT THINGS: SEVERAL DAYS
IF YOU CHECKED OFF ANY PROBLEMS ON THIS QUESTIONNAIRE, HOW DIFFICULT HAVE THESE PROBLEMS MADE IT FOR YOU TO DO YOUR WORK, TAKE CARE OF THINGS AT HOME, OR GET ALONG WITH OTHER PEOPLE: NOT DIFFICULT AT ALL

## 2019-06-06 ASSESSMENT — ACTIVITIES OF DAILY LIVING (ADL): DEPENDENT_IADLS:: INDEPENDENT

## 2019-06-06 ASSESSMENT — PAIN SCALES - GENERAL: PAINLEVEL: NO PAIN (0)

## 2019-06-06 ASSESSMENT — PATIENT HEALTH QUESTIONNAIRE - PHQ9: SUM OF ALL RESPONSES TO PHQ QUESTIONS 1-9: 3

## 2019-06-06 NOTE — NURSING NOTE
"Chief Complaint   Patient presents with     Medication Therapy Management       Initial /82 (BP Location: Left arm, Patient Position: Chair, Cuff Size: Adult Regular)   Pulse 108   Temp 98.3  F (36.8  C) (Tympanic)   Wt 65.7 kg (144 lb 12.8 oz)   SpO2 96%   BMI 25.65 kg/m   Estimated body mass index is 25.65 kg/m  as calculated from the following:    Height as of 5/10/19: 1.6 m (5' 3\").    Weight as of this encounter: 65.7 kg (144 lb 12.8 oz).  Medication Reconciliation: complete    Lynsey Ramos LPN    "

## 2019-06-06 NOTE — PROGRESS NOTES
Clinic Care Coordination Contact  Care Team Conversations    CC attended office visit with pt and Dr. Stack this date.  Please refer to Dr. Stack's note for plan of care.  Doing very well.  Still going to meetings.  No illicit use.  Feels good.  Encouraged her to call CC with any questions/concerns/problems.  Verbalized understanding.    Vera Robert RN-BSN  Chronic Pain Care Coordinator  [phone] opt. #3

## 2019-06-07 ASSESSMENT — ANXIETY QUESTIONNAIRES: GAD7 TOTAL SCORE: 3

## 2019-06-19 NOTE — PROGRESS NOTES
Follow-up Buprenorphine Visit           HPI       Brissa Santana is here for f/u on buprenophine/naloxone (Suboxone) therapy for opioid use disorder.  Medication Therapy Management      Brissa is currently taking 4/1 mg of Buprenorphine/Naloxone 2 times daily. This dosage is working to keep cravings at a manageable level. Aside from this, Brissa is currently participating in the following recovery activities Went to 1 meeting in the last few weeks.    Reports increased cravings mid-day and in the evening.  States they are manageable.  Takes at 9-10AM and 9-10PM.       Other medical concerns: No    Any ED visits? No     Withdrawal Symptoms   Fevers or chills? No   Abdominal pain, nausea, vomiting, diarrhea? No   Depression or anxiety?:No     Substance Use  Any opioids other than suboxone? No   Any alcohol use? No   Any other recreational drug use? No     History   Smoking Status     Former Smoker     Packs/day: 1.00     Years: 15.00     Types: Cigarettes     Quit date: 3/11/2017   Smokeless Tobacco     Never Used     Comment: Tried to quit (yes); longest period tobacco-free- 9 months       Problem, Medication and Allergy Lists were reviewed and updated if needed.  reviewed and are current..    Pharmacy Database reviewed? Yes, 6/20/19, as expected.    Patient is an established patient of this clinic..         Review of Systems:   Review of Systems  CONSTITUTIONAL: NEGATIVE for fever, chills, change in weight  INTEGUMENTARY/SKIN: NEGATIVE for worrisome rashes, moles or lesions  EYES: NEGATIVE for vision changes or irritation  ENT/MOUTH: NEGATIVE for ear, mouth and throat problems  RESP: NEGATIVE for significant cough or SOB  CV: NEGATIVE for chest pain, palpitations or peripheral edema  GI: NEGATIVE for nausea, abdominal pain, heartburn, or change in bowel habits  : NEGATIVE for frequency, dysuria, or hematuria  MUSCULOSKELETAL: NEGATIVE for significant arthralgias or myalgia  NEURO: NEGATIVE for weakness,  dizziness or paresthesias  ENDOCRINE: NEGATIVE for temperature intolerance, skin/hair changes  PSYCHIATRIC: NEGATIVE for changes in mood or affect          Physical Exam:     Vitals:    06/20/19 1445   BP: 108/76   BP Location: Right arm   Patient Position: Chair   Cuff Size: Adult Regular   Pulse: 94   Temp: 98  F (36.7  C)   TempSrc: Tympanic   SpO2: 98%   Weight: 64 kg (141 lb)     Body mass index is 24.98 kg/m .  Vitals were reviewed   Physical Exam  GENERAL APPEARANCE: alert, comfortable appearing  EYES: Eyes grossly normal to inspection  HENT:  nose no rhinorrhea  RESP: lungs clear to auscultation - no rales, rhonchi or wheezes and no resp distress  CV: regular rates and rhythm, normal S1 S2,no murmur   SKIN: no rashes, no jaundice, no obvious masses.   NEURO:  no tremor  MENTAL STATUS EXAM:  Appearance/Behavior:No apparent distress  Speech: Normal  Mood/Affect: normal affect  Insight: Adequate      Results:    Results from the last 24 hours  Results for orders placed or performed in visit on 06/20/19 (from the past 24 hour(s))   Urine Drugs of Abuse Screen (Tox13)   Result Value Ref Range    Cannabinoids (71-mtr-6-carboxy-9-THC) Not Detected NDET^Not Detected ng/mL    Phencyclidine (Phencyclidine) Detected, Abnormal Result (A) NDET^Not Detected ng/mL    Cocaine (Benzoylecgonine) Not Detected NDET^Not Detected ng/mL    Methamphetamine (d-Methamphetamine) Not Detected NDET^Not Detected ng/mL    Opiates (Morphine) Not Detected NDET^Not Detected ng/mL    Amphetamine (d-Amphetamine) Not Detected NDET^Not Detected ng/mL    Benzodiazepines (Nordiazepam) Not Detected NDET^Not Detected ng/mL    Tricyclic Antidepressants (Desipramine) Not Detected NDET^Not Detected ng/mL    Methadone (Methadone) Not Detected NDET^Not Detected ng/mL    Barbiturates (Butalbital) Not Detected NDET^Not Detected ng/mL    Oxycodone (Oxycodone) Not Detected NDET^Not Detected ng/mL    Propoxyphene (Norpropoxyphene) Not Detected NDET^Not  Detected ng/mL    Buprenorphine (Buprenorphine) Detected, Abnormal Result (A) NDET^Not Detected ng/mL     PCP was an unexpected result.  Pt adamantly denies using - was unaware of what PCP even was.  Will send out for confirmatory.    Assessment and Plan   PCP detected.  Patient denies.  Has never used that per report.    Confirmatory sent.    Was late for appointment.  Will get reminder call for care coordination before next visit.    Was naloxone nasal spray prescribed? No Details: has some at home.  1. Opioid use disorder (H)  Stable; manageable cravings  - Urine Drugs of Abuse Screen (Tox13)  - Pain Drug Scr UR W Rptd Meds  - buprenorphine HCl-naloxone HCl (SUBOXONE) 4-1 MG per film; Place 1 Film under the tongue 2 times daily  Dispense: 42 each; Refill: 0    Dosage will not need adjustment today.   Continue on:  4/1mg BID buprenorphine/naloxone (Suboxone).     Follow up Plan  Stage 2 (3 weeks): Please make a clinic appointment for follow up with me (TABITHA STATON) or another Suboxone provider in 3 weeks for suboxone follow up.    Greater than 15 minutes was spent with this patient, over half of which was on counseling and coordination of care which includes importance of recovery activities,which may include  attendance at NA/AA meetings, sober support network, and the importance of not isolating, being open, honest, and willing to change, possible triggers and how to avoid them, and managing cravings.    There are no discontinued medications.    Options for treatment and follow-up care were reviewed with the patient. Brissa engaged in the decision making process and verbalized understanding of the options discussed and agreed with the final plan.    Tabitha Staton MD

## 2019-06-20 ENCOUNTER — OFFICE VISIT (OUTPATIENT)
Dept: FAMILY MEDICINE | Facility: OTHER | Age: 39
End: 2019-06-20
Attending: FAMILY MEDICINE
Payer: COMMERCIAL

## 2019-06-20 ENCOUNTER — PATIENT OUTREACH (OUTPATIENT)
Dept: CARE COORDINATION | Facility: OTHER | Age: 39
End: 2019-06-20

## 2019-06-20 VITALS
HEART RATE: 94 BPM | BODY MASS INDEX: 24.98 KG/M2 | TEMPERATURE: 98 F | WEIGHT: 141 LBS | OXYGEN SATURATION: 98 % | DIASTOLIC BLOOD PRESSURE: 76 MMHG | SYSTOLIC BLOOD PRESSURE: 108 MMHG

## 2019-06-20 DIAGNOSIS — F11.90 OPIOID USE DISORDER: Primary | ICD-10-CM

## 2019-06-20 LAB
AMPHETAMINES UR QL: NOT DETECTED NG/ML
BARBITURATES UR QL SCN: NOT DETECTED NG/ML
BENZODIAZ UR QL SCN: NOT DETECTED NG/ML
BUPRENORPHINE UR QL: ABNORMAL NG/ML
CANNABINOIDS UR QL: NOT DETECTED NG/ML
COCAINE UR QL SCN: NOT DETECTED NG/ML
D-METHAMPHET UR QL: NOT DETECTED NG/ML
METHADONE UR QL SCN: NOT DETECTED NG/ML
OPIATES UR QL SCN: NOT DETECTED NG/ML
OXYCODONE UR QL SCN: NOT DETECTED NG/ML
PCP UR QL SCN: ABNORMAL NG/ML
PROPOXYPH UR QL: NOT DETECTED NG/ML
TRICYCLICS UR QL SCN: NOT DETECTED NG/ML

## 2019-06-20 PROCEDURE — G0463 HOSPITAL OUTPT CLINIC VISIT: HCPCS

## 2019-06-20 PROCEDURE — 99213 OFFICE O/P EST LOW 20 MIN: CPT | Performed by: FAMILY MEDICINE

## 2019-06-20 PROCEDURE — 80306 DRUG TEST PRSMV INSTRMNT: CPT | Mod: ZL | Performed by: FAMILY MEDICINE

## 2019-06-20 PROCEDURE — 80307 DRUG TEST PRSMV CHEM ANLYZR: CPT | Mod: ZL | Performed by: FAMILY MEDICINE

## 2019-06-20 PROCEDURE — 99000 SPECIMEN HANDLING OFFICE-LAB: CPT | Performed by: FAMILY MEDICINE

## 2019-06-20 RX ORDER — BUPRENORPHINE AND NALOXONE 4; 1 MG/1; MG/1
1 FILM, SOLUBLE BUCCAL; SUBLINGUAL 2 TIMES DAILY
Qty: 42 EACH | Refills: 0 | Status: SHIPPED | OUTPATIENT
Start: 2019-06-20 | End: 2019-07-10

## 2019-06-20 ASSESSMENT — ACTIVITIES OF DAILY LIVING (ADL): DEPENDENT_IADLS:: INDEPENDENT

## 2019-06-20 ASSESSMENT — PAIN SCALES - GENERAL: PAINLEVEL: NO PAIN (0)

## 2019-06-20 NOTE — NURSING NOTE
"Chief Complaint   Patient presents with     Medication Therapy Management       Initial /76 (BP Location: Right arm, Patient Position: Chair, Cuff Size: Adult Regular)   Pulse 94   Temp 98  F (36.7  C) (Tympanic)   Wt 64 kg (141 lb)   SpO2 98%   BMI 24.98 kg/m   Estimated body mass index is 24.98 kg/m  as calculated from the following:    Height as of 5/10/19: 1.6 m (5' 3\").    Weight as of this encounter: 64 kg (141 lb).  Medication Reconciliation: complete     Lynsey aRmos LPN          "

## 2019-06-20 NOTE — PROGRESS NOTES
Clinic Care Coordination Contact  Care Team Conversations    CC attended office visit with pt and Dr. Stack this date.  Please see Dr. Stack's note for plan of care.  Encouraged her to call CC with any questions/concerns/problems.  Verbalized understanding.    Vera Robert RN-BSN  Chronic Pain Care Coordinator  [phone] opt. #3

## 2019-06-28 LAB — PAIN DRUG SCR UR W RPTD MEDS: NORMAL

## 2019-07-10 ENCOUNTER — OFFICE VISIT (OUTPATIENT)
Dept: FAMILY MEDICINE | Facility: OTHER | Age: 39
End: 2019-07-10
Attending: FAMILY MEDICINE
Payer: COMMERCIAL

## 2019-07-10 ENCOUNTER — TELEPHONE (OUTPATIENT)
Dept: FAMILY MEDICINE | Facility: OTHER | Age: 39
End: 2019-07-10

## 2019-07-10 ENCOUNTER — PATIENT OUTREACH (OUTPATIENT)
Dept: CARE COORDINATION | Facility: OTHER | Age: 39
End: 2019-07-10

## 2019-07-10 VITALS
OXYGEN SATURATION: 98 % | DIASTOLIC BLOOD PRESSURE: 74 MMHG | TEMPERATURE: 98.5 F | SYSTOLIC BLOOD PRESSURE: 100 MMHG | WEIGHT: 145.6 LBS | BODY MASS INDEX: 25.79 KG/M2 | HEART RATE: 79 BPM

## 2019-07-10 DIAGNOSIS — F11.90 OPIOID USE DISORDER: Primary | ICD-10-CM

## 2019-07-10 PROCEDURE — 99213 OFFICE O/P EST LOW 20 MIN: CPT | Performed by: FAMILY MEDICINE

## 2019-07-10 PROCEDURE — 80306 DRUG TEST PRSMV INSTRMNT: CPT | Mod: ZL | Performed by: FAMILY MEDICINE

## 2019-07-10 PROCEDURE — G0463 HOSPITAL OUTPT CLINIC VISIT: HCPCS

## 2019-07-10 RX ORDER — BUPRENORPHINE AND NALOXONE 4; 1 MG/1; MG/1
1 FILM, SOLUBLE BUCCAL; SUBLINGUAL 2 TIMES DAILY
Qty: 56 EACH | Refills: 0 | Status: SHIPPED | OUTPATIENT
Start: 2019-07-10 | End: 2019-08-08

## 2019-07-10 ASSESSMENT — ACTIVITIES OF DAILY LIVING (ADL): DEPENDENT_IADLS:: INDEPENDENT

## 2019-07-10 ASSESSMENT — PAIN SCALES - GENERAL: PAINLEVEL: NO PAIN (0)

## 2019-07-10 NOTE — PROGRESS NOTES
Clinic Care Coordination Contact  [facility]/Voicemail       Clinical Data: Care Coordinator Outreach  Outreach attempted x 1.  Wanted to remind pt of appt date and time for tomorrow.  Phone not working.    Plan: Care Coordinator will do no further outreaches at this time.  Vera Robert RN-BSN  Chronic Pain Care Coordinator  [phone] opt. #3

## 2019-07-10 NOTE — PROGRESS NOTES
Follow-up Buprenorphine Visit           HPI       Brissa Santana is here for f/u on buprenophine/naloxone (Suboxone) therapy for opioid use disorder.  Medication Follow Up    Brissa is currently taking 4/1 mg of Buprenorphine/Naloxone 2 times daily.  This dosage is working to keep cravings at a manageable level. Aside from this, Brissa is currently participating in the following recovery activities No meetings at this time.  Feels really strong and confident at this time.   Feels like things continue to improve.  Did call today to be seen 1 day early - invited to spend time with family out of town and she does not drive - ride leaving today.  Other medical concerns: No     Any ED visits? No     Withdrawal Symptoms   Fevers or chills? No   Abdominal pain, nausea, vomiting, diarrhea? No   Depression or anxiety?: YES every once in a while - reports it is much better than it was 2 months ago    Substance Use  Any opioids other than suboxone? No   Any alcohol use? No   Any other recreational drug use? No     History   Smoking Status     Former Smoker     Packs/day: 1.00     Years: 15.00     Types: Cigarettes     Quit date: 3/11/2017   Smokeless Tobacco     Never Used     Comment: Tried to quit (yes); longest period tobacco-free- 9 months       Problem, Medication and Allergy Lists were reviewed and updated if needed.  reviewed and are current..    Pharmacy Database reviewed? Yes, 7.10.19, as expected.    Patient is an established patient of this clinic..         Review of Systems:   Review of Systems  CONSTITUTIONAL: NEGATIVE for fever, chills, change in weight  INTEGUMENTARY/SKIN: NEGATIVE for worrisome rashes, moles or lesions  EYES: NEGATIVE for vision changes or irritation  ENT/MOUTH: NEGATIVE for ear, mouth and throat problems  RESP: NEGATIVE for significant cough or SOB  CV: NEGATIVE for chest pain, palpitations or peripheral edema  GI: NEGATIVE for nausea, abdominal pain, heartburn, or change in bowel  habits  : NEGATIVE for frequency, dysuria, or hematuria  MUSCULOSKELETAL: NEGATIVE for significant arthralgias or myalgia  NEURO: NEGATIVE for weakness, dizziness or paresthesias  ENDOCRINE: NEGATIVE for temperature intolerance, skin/hair changes  PSYCHIATRIC: NEGATIVE for changes in mood or affect          Physical Exam:     Vitals:    07/10/19 1538   BP: 100/74   BP Location: Left arm   Patient Position: Chair   Cuff Size: Adult Regular   Pulse: 79   Temp: 98.5  F (36.9  C)   TempSrc: Tympanic   SpO2: 98%   Weight: 66 kg (145 lb 9.6 oz)     Body mass index is 25.79 kg/m .  Vitals were reviewed   Physical Exam  GENERAL APPEARANCE: alert, comfortable appearing  EYES: Eyes grossly normal to inspection  HENT:  nose no rhinorrhea  RESP: lungs clear to auscultation - no rales, rhonchi or wheezes and no resp distress  CV: regular rates and rhythm, normal S1 S2,no murmur   SKIN: no rashes, no jaundice, no obvious masses.   NEURO:  no tremor  MENTAL STATUS EXAM:  Appearance/Behavior:No apparent distress  Speech: Normal  Mood/Affect: normal affect  Insight: Adequate      Results:    Results from the last 24 hoursNo results found for this or any previous visit (from the past 24 hour(s)).    Assessment and Plan     Was naloxone nasal spray prescribed? No Details: has some at home.  1. Opioid use disorder (H)  Stable  - Urine Drugs of Abuse Screen (Tox13)    Dosage will not need adjustment today.  Continue at 4/1 mg  2 time(s) a day of  buprenorphine/naloxone (Suboxone).     Follow up Plan  Stage 3 (4 weeks): Please make a clinic appointment for follow up with me (SANDEEP STATON) or another Suboxone provider in 1 month for suboxone follow up.    Greater than 15 minutes was spent with this patient, over half of which was on counseling and coordination of care which includes importance of recovery activities,which may include  attendance at NA/AA meetings, sober support network, and the importance of not isolating, being  open, honest, and willing to change, possible triggers and how to avoid them, and managing cravings.    There are no discontinued medications.    Options for treatment and follow-up care were reviewed with the patient. Brissa engaged in the decision making process and verbalized understanding of the options discussed and agreed with the final plan.    Tabitha Sommers MD

## 2019-07-10 NOTE — NURSING NOTE
"Chief Complaint   Patient presents with     Medication Therapy Management       Initial /74 (BP Location: Left arm, Patient Position: Chair, Cuff Size: Adult Regular)   Pulse 79   Temp 98.5  F (36.9  C) (Tympanic)   Wt 66 kg (145 lb 9.6 oz)   SpO2 98%   BMI 25.79 kg/m   Estimated body mass index is 25.79 kg/m  as calculated from the following:    Height as of 5/10/19: 1.6 m (5' 3\").    Weight as of this encounter: 66 kg (145 lb 9.6 oz).  Medication Reconciliation: complete     Lynsey Ramos LPN      "

## 2019-07-10 NOTE — TELEPHONE ENCOUNTER
Patient calling stating she can't make her appointment that is scheduled tomorrow with Dr. Sommers and would like if she can get in today. Please call patient back at [phone].   No

## 2019-07-10 NOTE — PROGRESS NOTES
Clinic Care Coordination Contact  Care Team Conversations    CC attended office visit with pt and Dr. Stack this date.  Please refer to Dr. Stack's note for plan of care.  Doing very well.  Feeling strong and confident.  Has no desire to use.  She was commended on this.  No questions or concerns this date.  Encouraged her to call with any questions/concerns/problems.  Verbalized understanding.    Vera Robert RN-BSN  Chronic Pain Care Coordinator  [phone] opt. #3

## 2019-07-25 ENCOUNTER — OFFICE VISIT (OUTPATIENT)
Dept: OBGYN | Facility: OTHER | Age: 39
End: 2019-07-25
Attending: OBSTETRICS & GYNECOLOGY
Payer: COMMERCIAL

## 2019-07-25 VITALS
BODY MASS INDEX: 25.87 KG/M2 | SYSTOLIC BLOOD PRESSURE: 131 MMHG | DIASTOLIC BLOOD PRESSURE: 82 MMHG | HEIGHT: 63 IN | WEIGHT: 146 LBS

## 2019-07-25 DIAGNOSIS — N90.89 VULVAR LESION: ICD-10-CM

## 2019-07-25 DIAGNOSIS — B96.89 BV (BACTERIAL VAGINOSIS): ICD-10-CM

## 2019-07-25 DIAGNOSIS — N76.0 BV (BACTERIAL VAGINOSIS): ICD-10-CM

## 2019-07-25 DIAGNOSIS — R87.810 CERVICAL HIGH RISK HPV (HUMAN PAPILLOMAVIRUS) TEST POSITIVE: ICD-10-CM

## 2019-07-25 DIAGNOSIS — N76.0 VAGINITIS AND VULVOVAGINITIS: Primary | ICD-10-CM

## 2019-07-25 LAB
SPECIMEN SOURCE: NORMAL
WET PREP SPEC: NORMAL

## 2019-07-25 PROCEDURE — 88305 TISSUE EXAM BY PATHOLOGIST: CPT | Mod: TC | Performed by: OBSTETRICS & GYNECOLOGY

## 2019-07-25 PROCEDURE — 87210 SMEAR WET MOUNT SALINE/INK: CPT | Mod: ZL | Performed by: OBSTETRICS & GYNECOLOGY

## 2019-07-25 PROCEDURE — G0463 HOSPITAL OUTPT CLINIC VISIT: HCPCS | Mod: 25

## 2019-07-25 PROCEDURE — 56605 BIOPSY OF VULVA/PERINEUM: CPT | Performed by: OBSTETRICS & GYNECOLOGY

## 2019-07-25 PROCEDURE — 99213 OFFICE O/P EST LOW 20 MIN: CPT | Mod: 25 | Performed by: OBSTETRICS & GYNECOLOGY

## 2019-07-25 ASSESSMENT — MIFFLIN-ST. JEOR: SCORE: 1306.38

## 2019-07-25 ASSESSMENT — PAIN SCALES - GENERAL: PAINLEVEL: NO PAIN (0)

## 2019-07-25 NOTE — NURSING NOTE
"Chief Complaint   Patient presents with     Follow Up     vaginitis, pt was suppose to be seen 2/19/19       Initial /82 (BP Location: Left arm, Cuff Size: Adult Regular)   Ht 1.6 m (5' 3\")   Wt 66.2 kg (146 lb)   BMI 25.86 kg/m   Estimated body mass index is 25.86 kg/m  as calculated from the following:    Height as of this encounter: 1.6 m (5' 3\").    Weight as of this encounter: 66.2 kg (146 lb).  Medication Reconciliation: cheli Echols    "

## 2019-07-25 NOTE — PROGRESS NOTES
S:  F/u recurrent BV/Vulvogainitis.  Pt with h/o recurrent BV related to intercourse.She has found that if she takes flagyl with intercourse, and avoids male internal ejaculation,  episodes have become much less frequent and she has not had one for 3 months.  At her last visit it was also noted that she had a inner labial rash/leukoplakia.  She did not show up for f/u appt for vulvar biopsy.  Her last pap was nml with + HR HPV (other) 10/18.  No other complaint.  See my prior evaluation.          Patient Active Problem List   Diagnosis     Tramadol dependency and abuse     Drug overdose     ACP (advance care planning)     MDD (major depressive disorder), recurrent, severe, with psychosis (H)     FRANCIA (generalized anxiety disorder)     Uncomplicated opioid dependence (H)     Neck pain     History of reduction mammoplasty     History of spinal surgery     Insomnia     Inflammation of sacroiliac joint (H)            Past Medical History:   Diagnosis Date     Backache, unspecified 2007     Chemical dependency (H) 2016     FRANCIA (generalized anxiety disorder)      Heroin use 2013     Lumbago 2003     MDD (major depressive disorder), recurrent, severe, with psychosis (H)      Polypharmacy -h/o tramadol,ambien,narcotic depend. 2013     Tramadol dependency and abuse 2011            Past Surgical History:   Procedure Laterality Date     APPENDECTOMY       BACK SURGERY      lumbar diskectomy with fusion      SECTION      x2     COLONOSCOPY N/A 2018    Procedure: COLONOSCOPY;  DIAGNOSTIC COLONOSCOPY TOTAL HEMORRHOIDECTOMY;  Surgeon: Sharif Yeboah MD;  Location: [facility] OR     D & C       HEMORRHOIDECTOMY INTERNAL N/A 2018    Procedure: HEMORRHOIDECTOMY INTERNAL;;  Surgeon: Sharif Yeboah MD;  Location: [facility] OR     OTHER SURGICAL HISTORY      Breast Reduction     TONSILLECTOMY       TUBAL LIGATION              Social History     Tobacco Use     Smoking status: Former Smoker     " Packs/day: 1.00     Years: 15.00     Pack years: 15.00     Types: Cigarettes     Last attempt to quit: 3/11/2017     Years since quittin.3     Smokeless tobacco: Never Used     Tobacco comment: Tried to quit (yes); longest period tobacco-free- 9 months   Substance Use Topics     Alcohol use: No            Family History   Problem Relation Age of Onset     Breast Cancer Maternal Grandmother      Cancer Paternal Grandmother      Hepatitis Paternal Uncle         Hep C     Hepatitis Father         Hep C               Allergies   Allergen Reactions     Benzodiazepines      On Suboxone - not in pt's best interest to receives Benzos     Latex      Irritation; see comments            Current Outpatient Medications   Medication Sig Dispense Refill     buprenorphine HCl-naloxone HCl (SUBOXONE) 4-1 MG per film Place 1 Film under the tongue 2 times daily 56 each 0     venlafaxine (EFFEXOR-XR) 150 MG 24 hr capsule TAKE ONE CAPSULE BY MOUTH DAILY 90 capsule 0     metroNIDAZOLE (FLAGYL) 500 MG tablet TK 1 T PO BID  0     naloxone (NARCAN) 4 MG/0.1ML nasal spray Spray 1 spray (4 mg) into one nostril alternating nostrils as needed for opioid reversal every 2-3 minutes until assistance arrives (Patient not taking: Reported on 2019) 0.2 mL 0          Review Of Systems  Constitutional:  Denies fever  GI/ negative except as noted per hpi    O:   /82 (BP Location: Left arm, Cuff Size: Adult Regular)   Ht 1.6 m (5' 3\")   Wt 66.2 kg (146 lb)   BMI 25.86 kg/m    Gen:  NAD, A and O  Abd soft, NT, ND  Lymphadenopathy:  neg  Vulva: No lesions, erythema, BUS wnl.  Inner labia with leukoplakia/atrophic parchment skin changes with some labial  adhesions superiorly.   bilaterally.    Vagina: Pink, moist mucosa with rugae,no lesions.  + whitish DC  Cervix: No lesions,   Anus without lesions  Ext.  neg  Procedure:  After informed consent was obtained from the patient,  the area was swabbed with betadine prep and infiltrated with " 1% lidocaine. A 4mm punch biopsy was used to biopsy the lesion along left inner labia.  Specimen was submitted to pathology. Patient tolerated the procedure well but with considerable anxiety. EBL minimal.      A:P)  Recurrent BV.  Well controlled with current regimen.  CPM.  Labial leukoplakia.  Consistent with  chronic dermatitis or lichenoid skin d/o.  Vulvar biopsy pending.  Will call with results when available and treat accordingly.   H/o cervical HPV.  F/u pap smear 4 months.

## 2019-07-30 DIAGNOSIS — L90.0 LICHEN SCLEROSUS: Primary | ICD-10-CM

## 2019-07-30 RX ORDER — CLOBETASOL PROPIONATE 0.5 MG/G
CREAM TOPICAL 2 TIMES DAILY
Qty: 45 G | Refills: 1 | Status: SHIPPED | OUTPATIENT
Start: 2019-07-30 | End: 2021-04-07

## 2019-07-31 LAB — COPATH REPORT: NORMAL

## 2019-08-07 ENCOUNTER — TELEPHONE (OUTPATIENT)
Dept: FAMILY MEDICINE | Facility: OTHER | Age: 39
End: 2019-08-07

## 2019-08-07 NOTE — TELEPHONE ENCOUNTER
Called phone number provider - the girl that answered the phone stated that to call the # listed in pt's demographics - phone is charged now.  Phone call placed to pt.  She apologized - had court for 3 hours today - didn't think it would last that long.  Would like an appt on 8.8.19.  Appt made for 9:45AM.

## 2019-08-07 NOTE — TELEPHONE ENCOUNTER
Patient called and said she is going to be late and gave me this phone number for Vera to call [phone]

## 2019-08-08 ENCOUNTER — TELEPHONE (OUTPATIENT)
Dept: BEHAVIORAL HEALTH | Facility: OTHER | Age: 39
End: 2019-08-08

## 2019-08-08 ENCOUNTER — OFFICE VISIT (OUTPATIENT)
Dept: FAMILY MEDICINE | Facility: OTHER | Age: 39
End: 2019-08-08
Attending: FAMILY MEDICINE
Payer: COMMERCIAL

## 2019-08-08 ENCOUNTER — PATIENT OUTREACH (OUTPATIENT)
Dept: CARE COORDINATION | Facility: OTHER | Age: 39
End: 2019-08-08

## 2019-08-08 VITALS
WEIGHT: 134 LBS | SYSTOLIC BLOOD PRESSURE: 112 MMHG | DIASTOLIC BLOOD PRESSURE: 78 MMHG | HEART RATE: 77 BPM | TEMPERATURE: 98 F | OXYGEN SATURATION: 97 % | BODY MASS INDEX: 23.74 KG/M2

## 2019-08-08 DIAGNOSIS — F11.90 OPIOID USE DISORDER: Primary | ICD-10-CM

## 2019-08-08 DIAGNOSIS — F15.10 METHAMPHETAMINE USE (H): ICD-10-CM

## 2019-08-08 LAB
AMPHETAMINES UR QL: ABNORMAL NG/ML
BARBITURATES UR QL SCN: NOT DETECTED NG/ML
BENZODIAZ UR QL SCN: NOT DETECTED NG/ML
BUPRENORPHINE UR QL: ABNORMAL NG/ML
CANNABINOIDS UR QL: NOT DETECTED NG/ML
COCAINE UR QL SCN: NOT DETECTED NG/ML
D-METHAMPHET UR QL: NOT DETECTED NG/ML
METHADONE UR QL SCN: NOT DETECTED NG/ML
OPIATES UR QL SCN: NOT DETECTED NG/ML
OXYCODONE UR QL SCN: NOT DETECTED NG/ML
PCP UR QL SCN: NOT DETECTED NG/ML
PROPOXYPH UR QL: NOT DETECTED NG/ML
TRICYCLICS UR QL SCN: NOT DETECTED NG/ML

## 2019-08-08 PROCEDURE — 99214 OFFICE O/P EST MOD 30 MIN: CPT | Performed by: FAMILY MEDICINE

## 2019-08-08 PROCEDURE — G0463 HOSPITAL OUTPT CLINIC VISIT: HCPCS

## 2019-08-08 PROCEDURE — 80306 DRUG TEST PRSMV INSTRMNT: CPT | Mod: ZL | Performed by: FAMILY MEDICINE

## 2019-08-08 RX ORDER — BUPRENORPHINE AND NALOXONE 4; 1 MG/1; MG/1
1 FILM, SOLUBLE BUCCAL; SUBLINGUAL 2 TIMES DAILY
Qty: 56 EACH | Refills: 0 | Status: SHIPPED | OUTPATIENT
Start: 2019-08-08 | End: 2019-09-09

## 2019-08-08 ASSESSMENT — ACTIVITIES OF DAILY LIVING (ADL): DEPENDENT_IADLS:: INDEPENDENT

## 2019-08-08 ASSESSMENT — PAIN SCALES - GENERAL: PAINLEVEL: NO PAIN (0)

## 2019-08-08 NOTE — NURSING NOTE
"Chief Complaint   Patient presents with     Medication Therapy Management       Initial /78 (BP Location: Right arm, Patient Position: Chair, Cuff Size: Adult Regular)   Pulse 77   Temp 98  F (36.7  C) (Tympanic)   Wt 60.8 kg (134 lb)   SpO2 97%   BMI 23.74 kg/m   Estimated body mass index is 23.74 kg/m  as calculated from the following:    Height as of 7/25/19: 1.6 m (5' 3\").    Weight as of this encounter: 60.8 kg (134 lb).  Medication Reconciliation: complete     Lynsey Ramos LPN      "

## 2019-08-08 NOTE — PROGRESS NOTES
"Follow-up Buprenorphine Visit           HPI       Brissa Santana is here for f/u on buprenophine/naloxone (Suboxone) therapy for opioid use disorder.  Medication Therapy Management    Brissa is currently taking 4/1mg of Buprenorphine/Naloxone 2 times  daily.     Status since last visit:    Since last visit patient has been: doing well.     Intensity:     There has been: no craving.      Suboxone Dose: adequate.  \"great - everything is fine\".    Progression of Symptoms:     Cues to use and relapse Admitted to smoking meth    Recovery program has been: active.   Accompanying Signs & Symptoms:    Side Effects: none.    Sobriety:     Status: patient has relapsed.  Methamphetamine use - smoked 2 days ago.  Was in the wrong place, at the wrong time.  People were smoking and passed her the pipe and she smoked it.  Has regret, guilt, and shame.  She has removed this person from her phone/facebook and does not plan on speaking with him in the future.  Completed hated the feeling - is 100% confident that she will not use again.  She has a good plan set forth to avoid use in the future.    Drug Screen: obtained.    Precipitating factors:    Triggers have been: non-existent.   Alleviating factors:    Contact with sponsor has been: no sponsor.     Family and support system has been: helpful.   Other Therapies Tried :     Patient has been going to recovery meetings:sporadically.  Went to one recently    Other medical concerns: No    Any ED visits? No       History   Smoking Status     Former Smoker     Packs/day: 1.00     Years: 15.00     Types: Cigarettes     Quit date: 3/11/2017   Smokeless Tobacco     Never Used     Comment: Tried to quit (yes); longest period tobacco-free- 9 months       Problem, Medication and Allergy Lists were reviewed and updated if needed.  reviewed and are current..    Pharmacy Database reviewed? Yes, 8.8.19, as expected.    Patient is an established patient of this clinic..         Review of " Systems:   Review of Systems  CONSTITUTIONAL: NEGATIVE for fever, chills, change in weight  INTEGUMENTARY/SKIN: NEGATIVE for worrisome rashes, moles or lesions  EYES: NEGATIVE for vision changes or irritation  ENT/MOUTH: NEGATIVE for ear, mouth and throat problems  RESP: NEGATIVE for significant cough or SOB  CV: NEGATIVE for chest pain, palpitations or peripheral edema  GI: NEGATIVE for nausea, abdominal pain, heartburn, or change in bowel habits  : NEGATIVE for frequency, dysuria, or hematuria  MUSCULOSKELETAL: NEGATIVE for significant arthralgias or myalgia  NEURO: NEGATIVE for weakness, dizziness or paresthesias  ENDOCRINE: NEGATIVE for temperature intolerance, skin/hair changes  PSYCHIATRIC: NEGATIVE for changes in mood or affect          Physical Exam:     Vitals:    08/08/19 1006   BP: 112/78   BP Location: Right arm   Patient Position: Chair   Cuff Size: Adult Regular   Pulse: 77   Temp: 98  F (36.7  C)   TempSrc: Tympanic   SpO2: 97%   Weight: 60.8 kg (134 lb)     Body mass index is 23.74 kg/m .  Vitals were reviewed   Physical Exam  GENERAL APPEARANCE: alert, comfortable appearing  EYES: Eyes grossly normal to inspection  HENT:  nose no rhinorrhea  RESP: lungs clear to auscultation - no rales, rhonchi or wheezes and no resp distress  CV: regular rates and rhythm, normal S1 S2,no murmur   ABDOMEN:soft, non-tender, non-distended, no hepatosplenomegaly or masses  SKIN: no rashes, no jaundice, no obvious masses.   NEURO:  no tremor  MENTAL STATUS EXAM:  Appearance/Behavior:No apparent distress  Speech: Normal  Mood/Affect: normal affect  Insight: Adequate      Results:    Results from the last 24 hours  Results for orders placed or performed in visit on 08/08/19 (from the past 24 hour(s))   Urine Drugs of Abuse Screen (Tox13)   Result Value Ref Range    Cannabinoids (16-gxb-0-carboxy-9-THC) Not Detected NDET^Not Detected ng/mL    Phencyclidine (Phencyclidine) Not Detected NDET^Not Detected ng/mL    Cocaine  (Benzoylecgonine) Not Detected NDET^Not Detected ng/mL    Methamphetamine (d-Methamphetamine) Not Detected NDET^Not Detected ng/mL    Opiates (Morphine) Not Detected NDET^Not Detected ng/mL    Amphetamine (d-Amphetamine) Detected, Abnormal Result (A) NDET^Not Detected ng/mL    Benzodiazepines (Nordiazepam) Not Detected NDET^Not Detected ng/mL    Tricyclic Antidepressants (Desipramine) Not Detected NDET^Not Detected ng/mL    Methadone (Methadone) Not Detected NDET^Not Detected ng/mL    Barbiturates (Butalbital) Not Detected NDET^Not Detected ng/mL    Oxycodone (Oxycodone) Not Detected NDET^Not Detected ng/mL    Propoxyphene (Norpropoxyphene) Not Detected NDET^Not Detected ng/mL    Buprenorphine (Buprenorphine) Detected, Abnormal Result (A) NDET^Not Detected ng/mL       Assessment and Plan     Was naloxone nasal spray prescribed? No Details: has some at home.    1. Opioid use disorder (H)  Doing well overall.  - Urine Drugs of Abuse Screen (Tox13)            (F15.10) Methamphetamine use (H)  Comment: recent use x 1 with regret    Encouraged patient to follow through with counseling.  Referral to [facility] placed by care coordination.  Patient open to doing more counseling.  Discussed plan for when kids to back to school - more time on her hands.  Establishing routine, volunteer, work, etc.        Dosage will not need adjustment today.  Continue at 4/1mg mg  2 time(s) a day of  buprenorphine/naloxone (Suboxone).     Follow up Plan  Stage 3 (4 weeks): Please make a clinic appointment for follow up with me (SANDEEP STATON) or another Suboxone provider in 1 month for suboxone follow up.    Greater than 15 minutes was spent with this patient, over half of which was on counseling and coordination of care which includes importance of recovery activities,which may include  attendance at NA/AA meetings, sober support network, and the importance of not isolating, being open, honest, and willing to change, possible triggers and  how to avoid them, and managing cravings.    Medications Discontinued During This Encounter   Medication Reason     buprenorphine HCl-naloxone HCl (SUBOXONE) 4-1 MG per film Reorder       Options for treatment and follow-up care were reviewed with the patient. Brissa engaged in the decision making process and verbalized understanding of the options discussed and agreed with the final plan.    Tabitha Sommers MD

## 2019-08-08 NOTE — PROGRESS NOTES
Clinic Care Coordination Contact  Care Team Conversations    CC attended office visit with pt and Dr. Stack this date.  Please refer to Dr. Stack's note for plan of care.  Pt admitted to relapse on Methamphetamine.  Educated pt that relapse is often a part of recovery.  Admits shame, guilt, and anger about relapse.  Educated her that these are normal feelings and they will reinforce continued abstinence.  Verbalized understanding.  Also, she is interested in [facility] services.  Referral placed.  Encouraged her to call CC with any questions/concerns/problems.   Verbalized understanding.    Vera Robert RN-BSN  Chronic Pain Care Coordinator  [phone] opt. #3

## 2019-08-08 NOTE — TELEPHONE ENCOUNTER
Received referral for possible [facility] services from RN Chronic Pain Care Coordinator, Vera Robert.  Attempted to reach patient, but was unsuccessful.  Plan to attempt again.       Zoë Guo, [facility]  Social Work Care Coordinator  [facility]   [phone] or [phone]     02-Sep-2017 20:27

## 2019-08-18 ENCOUNTER — HOSPITAL ENCOUNTER (EMERGENCY)
Facility: HOSPITAL | Age: 39
Discharge: HOME | End: 2019-08-18
Attending: PHYSICIAN ASSISTANT | Admitting: PHYSICIAN ASSISTANT
Payer: COMMERCIAL

## 2019-08-18 VITALS
OXYGEN SATURATION: 97 % | TEMPERATURE: 98 F | DIASTOLIC BLOOD PRESSURE: 86 MMHG | SYSTOLIC BLOOD PRESSURE: 121 MMHG | HEART RATE: 73 BPM | RESPIRATION RATE: 18 BRPM

## 2019-08-18 DIAGNOSIS — K59.00 CONSTIPATION: ICD-10-CM

## 2019-08-18 DIAGNOSIS — R74.8 ELEVATED ALKALINE PHOSPHATASE LEVEL: ICD-10-CM

## 2019-08-18 LAB
ALBUMIN SERPL-MCNC: 3 G/DL (ref 3.4–5)
ALBUMIN UR-MCNC: 10 MG/DL
ALP SERPL-CCNC: 215 U/L (ref 40–150)
ALT SERPL W P-5'-P-CCNC: 42 U/L (ref 0–50)
ANION GAP SERPL CALCULATED.3IONS-SCNC: 7 MMOL/L (ref 3–14)
APPEARANCE UR: CLEAR
AST SERPL W P-5'-P-CCNC: 21 U/L (ref 0–45)
BACTERIA #/AREA URNS HPF: ABNORMAL /HPF
BASOPHILS # BLD AUTO: 0 10E9/L (ref 0–0.2)
BASOPHILS NFR BLD AUTO: 0.1 %
BILIRUB SERPL-MCNC: 0.2 MG/DL (ref 0.2–1.3)
BILIRUB UR QL STRIP: NEGATIVE
BUN SERPL-MCNC: 11 MG/DL (ref 7–30)
CALCIUM SERPL-MCNC: 9.4 MG/DL (ref 8.5–10.1)
CHLORIDE SERPL-SCNC: 100 MMOL/L (ref 94–109)
CO2 SERPL-SCNC: 28 MMOL/L (ref 20–32)
COLOR UR AUTO: ABNORMAL
CREAT SERPL-MCNC: 0.81 MG/DL (ref 0.52–1.04)
DIFFERENTIAL METHOD BLD: ABNORMAL
EOSINOPHIL # BLD AUTO: 0 10E9/L (ref 0–0.7)
EOSINOPHIL NFR BLD AUTO: 0.1 %
ERYTHROCYTE [DISTWIDTH] IN BLOOD BY AUTOMATED COUNT: 12.5 % (ref 10–15)
GFR SERPL CREATININE-BSD FRML MDRD: >90 ML/MIN/{1.73_M2}
GLUCOSE SERPL-MCNC: 94 MG/DL (ref 70–99)
GLUCOSE UR STRIP-MCNC: NEGATIVE MG/DL
HCT VFR BLD AUTO: 38.1 % (ref 35–47)
HGB BLD-MCNC: 12.8 G/DL (ref 11.7–15.7)
HGB UR QL STRIP: ABNORMAL
IMM GRANULOCYTES # BLD: 0 10E9/L (ref 0–0.4)
IMM GRANULOCYTES NFR BLD: 0.3 %
KETONES UR STRIP-MCNC: NEGATIVE MG/DL
LEUKOCYTE ESTERASE UR QL STRIP: NEGATIVE
LIPASE SERPL-CCNC: 32 U/L (ref 73–393)
LYMPHOCYTES # BLD AUTO: 0.7 10E9/L (ref 0.8–5.3)
LYMPHOCYTES NFR BLD AUTO: 10.7 %
MCH RBC QN AUTO: 30.8 PG (ref 26.5–33)
MCHC RBC AUTO-ENTMCNC: 33.6 G/DL (ref 31.5–36.5)
MCV RBC AUTO: 92 FL (ref 78–100)
MONOCYTES # BLD AUTO: 1 10E9/L (ref 0–1.3)
MONOCYTES NFR BLD AUTO: 14.6 %
MUCOUS THREADS #/AREA URNS LPF: PRESENT /LPF
NEUTROPHILS # BLD AUTO: 5 10E9/L (ref 1.6–8.3)
NEUTROPHILS NFR BLD AUTO: 74.2 %
NITRATE UR QL: NEGATIVE
NRBC # BLD AUTO: 0 10*3/UL
NRBC BLD AUTO-RTO: 0 /100
PH UR STRIP: 6 PH (ref 4.7–8)
PLATELET # BLD AUTO: 327 10E9/L (ref 150–450)
POTASSIUM SERPL-SCNC: 3.5 MMOL/L (ref 3.4–5.3)
PROT SERPL-MCNC: 7.9 G/DL (ref 6.8–8.8)
RBC # BLD AUTO: 4.15 10E12/L (ref 3.8–5.2)
RBC #/AREA URNS AUTO: 4 /HPF (ref 0–2)
SODIUM SERPL-SCNC: 135 MMOL/L (ref 133–144)
SOURCE: ABNORMAL
SP GR UR STRIP: 1.01 (ref 1–1.03)
SQUAMOUS #/AREA URNS AUTO: 2 /HPF (ref 0–1)
UROBILINOGEN UR STRIP-MCNC: NORMAL MG/DL (ref 0–2)
WBC # BLD AUTO: 6.8 10E9/L (ref 4–11)
WBC #/AREA URNS AUTO: 7 /HPF (ref 0–5)

## 2019-08-18 PROCEDURE — 96376 TX/PRO/DX INJ SAME DRUG ADON: CPT

## 2019-08-18 PROCEDURE — 25000132 ZZH RX MED GY IP 250 OP 250 PS 637: Performed by: PHYSICIAN ASSISTANT

## 2019-08-18 PROCEDURE — 99284 EMERGENCY DEPT VISIT MOD MDM: CPT | Mod: 25

## 2019-08-18 PROCEDURE — 85025 COMPLETE CBC W/AUTO DIFF WBC: CPT | Performed by: PHYSICIAN ASSISTANT

## 2019-08-18 PROCEDURE — 36415 COLL VENOUS BLD VENIPUNCTURE: CPT | Performed by: PHYSICIAN ASSISTANT

## 2019-08-18 PROCEDURE — 96374 THER/PROPH/DIAG INJ IV PUSH: CPT

## 2019-08-18 PROCEDURE — 81001 URINALYSIS AUTO W/SCOPE: CPT | Performed by: FAMILY MEDICINE

## 2019-08-18 PROCEDURE — 83690 ASSAY OF LIPASE: CPT | Performed by: PHYSICIAN ASSISTANT

## 2019-08-18 PROCEDURE — 25000128 H RX IP 250 OP 636: Performed by: PHYSICIAN ASSISTANT

## 2019-08-18 PROCEDURE — 25800030 ZZH RX IP 258 OP 636: Performed by: PHYSICIAN ASSISTANT

## 2019-08-18 PROCEDURE — 96361 HYDRATE IV INFUSION ADD-ON: CPT

## 2019-08-18 PROCEDURE — 99284 EMERGENCY DEPT VISIT MOD MDM: CPT | Mod: Z6 | Performed by: PHYSICIAN ASSISTANT

## 2019-08-18 PROCEDURE — 80053 COMPREHEN METABOLIC PANEL: CPT | Performed by: PHYSICIAN ASSISTANT

## 2019-08-18 RX ORDER — ONDANSETRON 2 MG/ML
4 INJECTION INTRAMUSCULAR; INTRAVENOUS EVERY 30 MIN PRN
Status: DISCONTINUED | OUTPATIENT
Start: 2019-08-18 | End: 2019-08-18 | Stop reason: HOSPADM

## 2019-08-18 RX ORDER — SODIUM CHLORIDE 9 MG/ML
1000 INJECTION, SOLUTION INTRAVENOUS CONTINUOUS
Status: DISCONTINUED | OUTPATIENT
Start: 2019-08-18 | End: 2019-08-18 | Stop reason: HOSPADM

## 2019-08-18 RX ORDER — ONDANSETRON 8 MG/1
8 TABLET, ORALLY DISINTEGRATING ORAL EVERY 8 HOURS PRN
Qty: 10 TABLET | Refills: 1 | Status: SHIPPED | OUTPATIENT
Start: 2019-08-18 | End: 2019-10-10

## 2019-08-18 RX ORDER — POLYETHYLENE GLYCOL 3350 17 G/17G
68 POWDER, FOR SOLUTION ORAL ONCE
Status: COMPLETED | OUTPATIENT
Start: 2019-08-18 | End: 2019-08-18

## 2019-08-18 RX ADMIN — SODIUM CHLORIDE 1000 ML: 9 INJECTION, SOLUTION INTRAVENOUS at 18:46

## 2019-08-18 RX ADMIN — SODIUM CHLORIDE 1000 ML: 9 INJECTION, SOLUTION INTRAVENOUS at 17:25

## 2019-08-18 RX ADMIN — ONDANSETRON 4 MG: 2 INJECTION INTRAMUSCULAR; INTRAVENOUS at 18:46

## 2019-08-18 RX ADMIN — ONDANSETRON 4 MG: 2 INJECTION INTRAMUSCULAR; INTRAVENOUS at 17:25

## 2019-08-18 RX ADMIN — POLYETHYLENE GLYCOL 3350 68 G: 17 POWDER, FOR SOLUTION ORAL at 17:25

## 2019-08-18 NOTE — ED AVS SNAPSHOT
[facility]  [address] [address]  Phone:  [phone]                                    Brissa Santana   MRN: [id number]    Department:  [facility]   Date of Visit:  8/18/2019           After Visit Summary Signature Page    I have received my discharge instructions, and my questions have been answered. I have discussed any challenges I see with this plan with the nurse or doctor.    ..........................................................................................................................................  Patient/Patient Representative Signature      ..........................................................................................................................................  Patient Representative Print Name and Relationship to Patient    ..................................................               ................................................  Date                                   Time    ..........................................................................................................................................  Reviewed by Signature/Title    ...................................................              ..............................................  Date                                               Time          22EPIC Rev 08/18

## 2019-08-18 NOTE — ED PROVIDER NOTES
History     Chief Complaint   Patient presents with     Vomiting     HPI  Brissa Santana is a 38 year old female who presents emergency department with complaints of nausea and vomiting for the past 5 days with associated fever to 103.  Patient started developing moderate abdominal pain on the left side within the last 2 days.  Patient also notes that she has not had a bowel movement in the last 2 weeks.  Patient is currently on a Suboxone program and suspects this may be causing her constipation.  She has been taking stool softeners without improvement.  No prior history of diverticulitis.  Has had prior tubal ligation, appendectomy, hemorrhoidectomy.      Allergies:  Allergies   Allergen Reactions     Benzodiazepines      On Suboxone - not in pt's best interest to receives Benzos     Latex      Irritation; see comments       Problem List:    Patient Active Problem List    Diagnosis Date Noted     ACP (advance care planning) 07/14/2016     Priority: Medium     Advance Care Planning 7/14/2016: ACP Review of Chart / Resources Provided:  Reviewed chart for advance care plan.  Brissa Santana has no plan or code status on file. Discussed available resources and provided with information.   Added by Corbin Vlaencia             Uncomplicated opioid dependence (H) 07/14/2016     Priority: Medium     MDD (major depressive disorder), recurrent, severe, with psychosis (H)      Priority: Medium     FRANCIA (generalized anxiety disorder)      Priority: Medium     Drug overdose 02/19/2016     Priority: Medium     Inflammation of sacroiliac joint (H) 10/16/2015     Priority: Medium     Insomnia 11/21/2013     Priority: Medium     Neck pain 05/20/2013     Priority: Medium     History of reduction mammoplasty 05/20/2013     Priority: Medium     History of spinal surgery 05/20/2013     Priority: Medium     Tramadol dependency and abuse 03/16/2011     Priority: Medium        Past Medical History:    Past Medical History:   Diagnosis  Date     Backache, unspecified 2007     Chemical dependency (H) 2016     FRANCIA (generalized anxiety disorder)      Heroin use 2013     Lumbago 2003     MDD (major depressive disorder), recurrent, severe, with psychosis (H)      Polypharmacy -h/o tramadol,ambien,narcotic depend. 2013     Tramadol dependency and abuse 2011       Past Surgical History:    Past Surgical History:   Procedure Laterality Date     APPENDECTOMY       BACK SURGERY      lumbar diskectomy with fusion      SECTION      x2     COLONOSCOPY N/A 2018    Procedure: COLONOSCOPY;  DIAGNOSTIC COLONOSCOPY TOTAL HEMORRHOIDECTOMY;  Surgeon: Sharif Yeboah MD;  Location: [facility] OR     D & C       HEMORRHOIDECTOMY INTERNAL N/A 2018    Procedure: HEMORRHOIDECTOMY INTERNAL;;  Surgeon: Sharif Yeboah MD;  Location: [facility] OR     OTHER SURGICAL HISTORY      Breast Reduction     TONSILLECTOMY       TUBAL LIGATION         Family History:    Family History   Problem Relation Age of Onset     Breast Cancer Maternal Grandmother      Cancer Paternal Grandmother      Hepatitis Paternal Uncle         Hep C     Hepatitis Father         Hep C       Social History:  Marital Status:  Single [1]  Social History     Tobacco Use     Smoking status: Former Smoker     Packs/day: 1.00     Years: 15.00     Pack years: 15.00     Types: Cigarettes     Last attempt to quit: 3/11/2017     Years since quittin.4     Smokeless tobacco: Never Used     Tobacco comment: Tried to quit (yes); longest period tobacco-free- 9 months   Substance Use Topics     Alcohol use: No     Drug use: No        Medications:      buprenorphine HCl-naloxone HCl (SUBOXONE) 4-1 MG per film   clobetasol (TEMOVATE) 0.05 % external cream   ondansetron (ZOFRAN-ODT) 8 MG ODT tab   venlafaxine (EFFEXOR-XR) 150 MG 24 hr capsule   naloxone (NARCAN) 4 MG/0.1ML nasal spray         Review of Systems   Constitutional: Positive for fever.   Respiratory: Negative.     Cardiovascular: Negative.    Gastrointestinal: Positive for abdominal pain, constipation, nausea and vomiting. Negative for diarrhea.   Genitourinary: Negative.        Physical Exam   BP: 130/88  Pulse: 92  Temp: 98  F (36.7  C)  Resp: 16  SpO2: 96 %      Physical Exam   Constitutional: She is oriented to person, place, and time. No distress.   HENT:   Head: Atraumatic.   Mouth/Throat: Oropharynx is clear and moist. No oropharyngeal exudate.   Eyes: Pupils are equal, round, and reactive to light. Conjunctivae are normal. No scleral icterus.   Neck: Normal range of motion.   Cardiovascular: Normal heart sounds and intact distal pulses.   Pulmonary/Chest: Breath sounds normal. No respiratory distress.   Abdominal: Soft. Bowel sounds are normal. There is tenderness (Minimal tenderness to palpation on left mid abdomen with no guarding or rebound.).   Genitourinary:   Genitourinary Comments: Patient declined rectal exam.   Musculoskeletal: She exhibits no edema or tenderness.   Neurological: She is alert and oriented to person, place, and time.   Skin: Skin is warm. No rash noted. She is not diaphoretic.       ED Course        Procedures  Patient was given 1 L NS and Zofran and noted significant improvement in symptoms including abdominal discomfort.  Patient was given 4 doses of Miralax in 1 L of Pedialyte.  She did not have a BM while in ED.  She declined Fleet enema.  Recommended repeat use of Miralax and Pedialyte as done in ED until significant BM results.  Recommended recheck with PCP if no significant BM in the next 2-3 days.  Recommended trial of Fleet enema at home.               Results for orders placed or performed during the hospital encounter of 08/18/19 (from the past 24 hour(s))   CBC with platelets differential   Result Value Ref Range    WBC 6.8 4.0 - 11.0 10e9/L    RBC Count 4.15 3.8 - 5.2 10e12/L    Hemoglobin 12.8 11.7 - 15.7 g/dL    Hematocrit 38.1 35.0 - 47.0 %    MCV 92 78 - 100 fl    MCH 30.8  26.5 - 33.0 pg    MCHC 33.6 31.5 - 36.5 g/dL    RDW 12.5 10.0 - 15.0 %    Platelet Count 327 150 - 450 10e9/L    Diff Method Automated Method     % Neutrophils 74.2 %    % Lymphocytes 10.7 %    % Monocytes 14.6 %    % Eosinophils 0.1 %    % Basophils 0.1 %    % Immature Granulocytes 0.3 %    Nucleated RBCs 0 0 /100    Absolute Neutrophil 5.0 1.6 - 8.3 10e9/L    Absolute Lymphocytes 0.7 (L) 0.8 - 5.3 10e9/L    Absolute Monocytes 1.0 0.0 - 1.3 10e9/L    Absolute Eosinophils 0.0 0.0 - 0.7 10e9/L    Absolute Basophils 0.0 0.0 - 0.2 10e9/L    Abs Immature Granulocytes 0.0 0 - 0.4 10e9/L    Absolute Nucleated RBC 0.0    Comprehensive metabolic panel   Result Value Ref Range    Sodium 135 133 - 144 mmol/L    Potassium 3.5 3.4 - 5.3 mmol/L    Chloride 100 94 - 109 mmol/L    Carbon Dioxide 28 20 - 32 mmol/L    Anion Gap 7 3 - 14 mmol/L    Glucose 94 70 - 99 mg/dL    Urea Nitrogen 11 7 - 30 mg/dL    Creatinine 0.81 0.52 - 1.04 mg/dL    GFR Estimate >90 >60 mL/min/[1.73_m2]    GFR Estimate If Black >90 >60 mL/min/[1.73_m2]    Calcium 9.4 8.5 - 10.1 mg/dL    Bilirubin Total 0.2 0.2 - 1.3 mg/dL    Albumin 3.0 (L) 3.4 - 5.0 g/dL    Protein Total 7.9 6.8 - 8.8 g/dL    Alkaline Phosphatase 215 (H) 40 - 150 U/L    ALT 42 0 - 50 U/L    AST 21 0 - 45 U/L   Lipase   Result Value Ref Range    Lipase 32 (L) 73 - 393 U/L   UA reflex to Microscopic and Culture   Result Value Ref Range    Color Urine Light Yellow     Appearance Urine Clear     Glucose Urine Negative NEG^Negative mg/dL    Bilirubin Urine Negative NEG^Negative    Ketones Urine Negative NEG^Negative mg/dL    Specific Gravity Urine 1.006 1.003 - 1.035    Blood Urine Small (A) NEG^Negative    pH Urine 6.0 4.7 - 8.0 pH    Protein Albumin Urine 10 (A) NEG^Negative mg/dL    Urobilinogen mg/dL Normal 0.0 - 2.0 mg/dL    Nitrite Urine Negative NEG^Negative    Leukocyte Esterase Urine Negative NEG^Negative    Source Midstream Urine     RBC Urine 4 (H) 0 - 2 /HPF    WBC Urine 7 (H) 0 -  5 /HPF    Bacteria Urine Few (A) NEG^Negative /HPF    Squamous Epithelial /HPF Urine 2 (H) 0 - 1 /HPF    Mucous Urine Present (A) NEG^Negative /LPF       Medications   0.9% sodium chloride BOLUS (0 mLs Intravenous Stopped 8/18/19 1841)   polyethylene glycol (MIRALAX/GLYCOLAX) Packet 68 g (68 g Oral Given 8/18/19 1725)       Assessments & Plan (with Medical Decision Making)     I have reviewed the nursing notes.    I have reviewed the findings, diagnosis, plan and need for follow up with the patient.    Discharge Medication List as of 8/18/2019  7:33 PM      START taking these medications    Details   ondansetron (ZOFRAN-ODT) 8 MG ODT tab Take 1 tablet (8 mg) by mouth every 8 hours as needed for nausea, Disp-10 tablet, R-1, E-Prescribe             Final diagnoses:   Constipation   Elevated alkaline phosphatase level     FREDDY Rocha on 8/19/2019 at 10:30 AM   8/18/2019   [facility]     Anish Barrera PA  08/19/19 1035

## 2019-08-18 NOTE — ED NOTES
"Patient notes 5 days of the \"stomach flu\" with nausea and vomiting \"bile\". Patient has been having fevers and chills. Patient also notes that she has not had a BM in 2 weeks, states that she is on \"the suboxone program\" and feels that is making constipated.   "

## 2019-08-18 NOTE — DISCHARGE INSTRUCTIONS
Mix 1 capful Miralax per 1 cup Pedialyte and repeat every 30-60 minutes as necessary until good results.

## 2019-08-19 ASSESSMENT — ENCOUNTER SYMPTOMS
CARDIOVASCULAR NEGATIVE: 1
VOMITING: 1
FEVER: 1
NAUSEA: 1
DIARRHEA: 0
ABDOMINAL PAIN: 1
CONSTIPATION: 1
RESPIRATORY NEGATIVE: 1

## 2019-08-19 NOTE — ED NOTES
Patient able to provide urine sample at this time. Patient notes that she is passing flatus and having nausea again. Patient given PRN zofran.

## 2019-08-19 NOTE — ED NOTES
Pt aware of need to  and use the zofran prn at pharmacy in the am. Will start additional miralax in the am per pt. Encouraged return to the ED with any new or worsening sx.

## 2019-08-26 ENCOUNTER — TELEPHONE (OUTPATIENT)
Dept: BEHAVIORAL HEALTH | Facility: OTHER | Age: 39
End: 2019-08-26

## 2019-08-26 NOTE — TELEPHONE ENCOUNTER
Received referral for possible [facility] services from RN Chronic Pain Care Coordinator, Vera Robert.  Attempted to reach patient, but was unsuccessful.     Zoë Guo [facility]  Social Work Care Coordinator  [facility]   [phone] or [phone]

## 2019-09-09 ENCOUNTER — OFFICE VISIT (OUTPATIENT)
Dept: FAMILY MEDICINE | Facility: OTHER | Age: 39
End: 2019-09-09
Attending: FAMILY MEDICINE
Payer: COMMERCIAL

## 2019-09-09 VITALS
HEART RATE: 76 BPM | BODY MASS INDEX: 23.84 KG/M2 | TEMPERATURE: 98.6 F | OXYGEN SATURATION: 97 % | DIASTOLIC BLOOD PRESSURE: 82 MMHG | WEIGHT: 134.6 LBS | SYSTOLIC BLOOD PRESSURE: 118 MMHG

## 2019-09-09 DIAGNOSIS — F41.1 GAD (GENERALIZED ANXIETY DISORDER): ICD-10-CM

## 2019-09-09 DIAGNOSIS — F33.3 MDD (MAJOR DEPRESSIVE DISORDER), RECURRENT, SEVERE, WITH PSYCHOSIS (H): ICD-10-CM

## 2019-09-09 DIAGNOSIS — F11.90 OPIOID USE DISORDER: Primary | ICD-10-CM

## 2019-09-09 DIAGNOSIS — K59.00 CONSTIPATION, UNSPECIFIED CONSTIPATION TYPE: ICD-10-CM

## 2019-09-09 PROCEDURE — G0463 HOSPITAL OUTPT CLINIC VISIT: HCPCS

## 2019-09-09 PROCEDURE — 99214 OFFICE O/P EST MOD 30 MIN: CPT | Performed by: FAMILY MEDICINE

## 2019-09-09 PROCEDURE — 80306 DRUG TEST PRSMV INSTRMNT: CPT | Mod: ZL | Performed by: FAMILY MEDICINE

## 2019-09-09 RX ORDER — BUPRENORPHINE AND NALOXONE 4; 1 MG/1; MG/1
1 FILM, SOLUBLE BUCCAL; SUBLINGUAL 2 TIMES DAILY
Qty: 56 EACH | Refills: 0 | Status: SHIPPED | OUTPATIENT
Start: 2019-09-09 | End: 2019-10-10

## 2019-09-09 RX ORDER — DOCUSATE SODIUM 100 MG/1
100 CAPSULE, LIQUID FILLED ORAL 2 TIMES DAILY
Qty: 60 CAPSULE | Refills: 11 | Status: SHIPPED | OUTPATIENT
Start: 2019-09-09 | End: 2019-10-25

## 2019-09-09 ASSESSMENT — PAIN SCALES - GENERAL: PAINLEVEL: NO PAIN (0)

## 2019-09-09 NOTE — PROGRESS NOTES
Follow-up Buprenorphine Visit           HPI       Brissa Santana is here for f/u on buprenophine/naloxone (Suboxone) therapy for opioid use disorder.  Medication Therapy Management      Brissa is currently taking 4 mg of Buprenorphine/Naloxone 2 daily.     Was out for a couple days.  Had not been feeling well - had viral illness - and so was not able to come in.  That has resolved.  Last dose was this morning - had 1 picked up at pharmacy - single dose script.    Status since last visit:    Since last visit patient has been: doing well.     Intensity:     There has been: moderate craving.  Typical.  Manageable.    Suboxone Dose: adequate.    Progression of Symptoms:     Cues to use and relapse.  Neighbor uses - seeing out her window - knows it is present.  No pressure.    Recovery program has been: solid.   Accompanying Signs & Symptoms:    Side Effects: none.    Sobriety:     Status: no use since last visit.      Drug Screen: obtained.    Precipitating factors:     Triggers have been: mild.   Alleviating factors:    Contact with sponsor has been: no sponsor.     Family and support system has been: n/a.   Other Therapies Tried :     Patient has been going to recovery meetings:sporadically.  [place].  Friend chairs meetings.    Kids back in school - some boredom when kids are gone    Somewhat isolation    Hasn't given counseling, volunteering, etc yes    Open to counseling; feeling down lately    Constipation - chronic; taking metamucil    Other medical concerns: No    Any ED visits? No       History   Smoking Status     Former Smoker     Packs/day: 1.00     Years: 15.00     Types: Cigarettes     Quit date: 3/11/2017   Smokeless Tobacco     Never Used     Comment: Tried to quit (yes); longest period tobacco-free- 9 months       Problem, Medication and Allergy Lists were reviewed and are current..    Pharmacy Database reviewed?     Patient is an established patient of this clinic..         Review of Systems:    Review of Systems  CONSTITUTIONAL: NEGATIVE for fever, chills, change in weight  INTEGUMENTARY/SKIN: NEGATIVE for worrisome rashes, moles or lesions  EYES: NEGATIVE for vision changes or irritation  ENT/MOUTH: NEGATIVE for ear, mouth and throat problems  RESP: NEGATIVE for significant cough or SOB  CV: NEGATIVE for chest pain, palpitations or peripheral edema  GI: NEGATIVE for nausea, abdominal pain, heartburn, or change in bowel habits  MUSCULOSKELETAL: NEGATIVE for significant arthralgias or myalgia  NEURO: NEGATIVE for weakness, dizziness or paresthesias  HEME/ALLERGY: NEGATIVE for bleeding problems  PSYCHIATRIC: NEGATIVE for changes in mood or affect          Physical Exam:     Vitals:    09/09/19 1556   BP: 118/82   BP Location: Right arm   Patient Position: Chair   Cuff Size: Adult Regular   Pulse: 76   Temp: 98.6  F (37  C)   TempSrc: Tympanic   SpO2: 97%   Weight: 61.1 kg (134 lb 9.6 oz)     Body mass index is 23.84 kg/m .  Vitals were reviewed and were normal  Vitals were reviewed   Physical Exam  GENERAL APPEARANCE: alert, comfortable appearing  EYES: Eyes grossly normal to inspection  HENT:  nose no rhinorrhea  RESP: lungs clear to auscultation - no rales, rhonchi or wheezes and no resp distress  CV: regular rates and rhythm, normal S1 S2,no murmur   ABDOMEN:soft, non-tender, non-distended, no hepatosplenomegaly or masses  SKIN: no rashes, no jaundice, no obvious masses.   NEURO:  no tremor  MENTAL STATUS EXAM:  Appearance/Behavior:No apparent distress  Speech: Normal  Mood/Affect: normal affect  Insight: Adequate      Results:    Results from the last 24 hours  Results for orders placed or performed in visit on 09/09/19 (from the past 24 hour(s))   Urine Drugs of Abuse Screen (Tox13)   Result Value Ref Range    Cannabinoids (03-aoq-4-carboxy-9-THC) Not Detected NDET^Not Detected ng/mL    Phencyclidine (Phencyclidine) Not Detected NDET^Not Detected ng/mL    Cocaine (Benzoylecgonine) Not Detected  NDET^Not Detected ng/mL    Methamphetamine (d-Methamphetamine) Not Detected NDET^Not Detected ng/mL    Opiates (Morphine) Not Detected NDET^Not Detected ng/mL    Amphetamine (d-Amphetamine) Not Detected NDET^Not Detected ng/mL    Benzodiazepines (Nordiazepam) Not Detected NDET^Not Detected ng/mL    Tricyclic Antidepressants (Desipramine) Not Detected NDET^Not Detected ng/mL    Methadone (Methadone) Not Detected NDET^Not Detected ng/mL    Barbiturates (Butalbital) Not Detected NDET^Not Detected ng/mL    Oxycodone (Oxycodone) Not Detected NDET^Not Detected ng/mL    Propoxyphene (Norpropoxyphene) Not Detected NDET^Not Detected ng/mL    Buprenorphine (Buprenorphine) Detected, Abnormal Result (A) NDET^Not Detected ng/mL       Assessment and Plan     Was naloxone nasal spray prescribed? Yes.  Prior.  1. Opioid use disorder (H)  stable  - Urine Drugs of Abuse Screen (Tox13)  - MENTAL HEALTH REFERRAL  - Adult; Outpatient Treatment; Individual/Couples/Family/Group Therapy/Health Psychology; Other: Not Listed - Enter Referral Details in Scheduling Comments Below    2. MDD (major depressive disorder), recurrent, severe, with psychosis (H)  Would be good to start counseling again.  Kids are back in school. She has more down time.  She is interested in [facility].  Referral placed and number given.  - MENTAL HEALTH REFERRAL  - Adult; Outpatient Treatment; Individual/Couples/Family/Group Therapy/Health Psychology; Other: Not Listed - Enter Referral Details in Scheduling Comments Below    3. FRANCIA (generalized anxiety disorder)  - MENTAL HEALTH REFERRAL  - Adult; Outpatient Treatment; Individual/Couples/Family/Group Therapy/Health Psychology; Other: Not Listed - [facility] Referral Details in Scheduling Comments Below    4. Constipation, unspecified constipation type  - docusate sodium (COLACE) 100 MG capsule; Take 1 capsule (100 mg) by mouth 2 times daily  Dispense: 60 capsule; Refill: 11    Dosage will not need adjustment  today.  Continue at 4 mg  2 time(s) a day of  buprenorphine/naloxone (Suboxone).     Follow up Plan  Stage 3 (4 weeks): Please make a clinic appointment for follow up with me (TABITHA STATON) or another Suboxone provider in 1 month for suboxone follow up.    Greater than 25 minutes was spent with this patient, over half of which was on counseling and coordination of care which includes importance of recovery activities,which may include  attendance at NA/AA meetings, sober support network, and the importance of not isolating, being open, honest, and willing to change, possible triggers and how to avoid them, and managing cravings.    Medications Discontinued During This Encounter   Medication Reason     buprenorphine HCl-naloxone HCl (SUBOXONE) 4-1 MG per film Reorder       Options for treatment and follow-up care were reviewed with the patient. Brissa engaged in the decision making process and verbalized understanding of the options discussed and agreed with the final plan.    Tabitha Staton MD

## 2019-09-09 NOTE — NURSING NOTE
"Chief Complaint   Patient presents with     Medication Therapy Management       Initial /82 (BP Location: Right arm, Patient Position: Chair, Cuff Size: Adult Regular)   Pulse 76   Temp 98.6  F (37  C) (Tympanic)   Wt 61.1 kg (134 lb 9.6 oz)   SpO2 97%   BMI 23.84 kg/m   Estimated body mass index is 23.84 kg/m  as calculated from the following:    Height as of 7/25/19: 1.6 m (5' 3\").    Weight as of this encounter: 61.1 kg (134 lb 9.6 oz).  Medication Reconciliation: complete     Lynsey Ramos LPN      " PRE-OP EVALUATION    Patient Name: Lefty Mckinney    Pre-op Diagnosis: Kidney stones [N20.0]    Procedure(s):  CYSTOSCOPY, RETROGRADE, PYELOGRAM,  URETEROSCOPY, INSETTION BILATERAL URETERAL STENT    Surgeon(s) and Role:     Patsy Graves MD - Primary Anesthetic Complications  (-) history of anesthetic complications         GI/Hepatic/Renal             (+) chronic renal disease (Nephrolithiasis)                    Cardiovascular                (+) obesity                                       Endo/Oth  09/01/2019    K 4.1 09/01/2019     (H) 09/01/2019    CO2 25.0 09/01/2019    BUN 16 09/01/2019    CREATSERUM 0.86 09/01/2019    GLU 94 09/01/2019    CA 7.5 (L) 09/01/2019            Airway      Mallampati: II  Mouth opening: >3 FB  TM distance

## 2019-09-09 NOTE — PATIENT INSTRUCTIONS
Referral for counseling to [facility].  Add Colace 1-2 daily for chronic constipation.   Can use OTC MIralax daily as needed for constipation as well.  Follow up 4 weeks.       Psychologists/ Counselors      [place]   [phone]  [facility]   [phone]  [facility] [phone]  [facility] (kids) [phone]  [facility](teens) [phone]  [facility]  [phone]  [facility]  [phone]  [facility]  [phone]  [facility]  [phone]  [facility]  [phone]  [facility] counseling [phone] (Opening new location)  [facility]   [phone] (Opening new location)  [facility]    [phone]      [facility]  [phone]  [facility] [phone]  [facility]  [phone]  [facility]  [phone]     [facility] [phone]      [facility] counseling [phone]  [facility]   [phone]  [facility] (Camilo Ramirez LMFT)                                                   [phone]  Evelyne López  [phone]  [facility] [phone]  [facility]      [phone]  [facility]       [phone]  [facility]  [phone]  [facility]      [phone]     [place]  Zack Maguire   [phone]  [facility]      [phone]  [facility] [phone]  [facility]      [phone]  [facility]  [phone]        *Facilities in bold italics indicate medication management  Services are offered.    Crisis support  Mobile crisis line- [phone]  [facility] Range: Free online resources including therapy for Mental Health and substance use problems: Http://thriverange.org    Crisis Text Line  Text HOME to 491-286 - The Crisis Text Line serves anyone, in any type of crisis,  providing access to free, 24/7 support and information via the medium people already use and trust  http://www.crisistextline.org   Here's how it works:  Text HOME to 485-868 from anywhere in the [place], anytime, about any type of crisis.  A live, trained Crisis Counselor receives the text and responds quickly.  The volunteer Crisis Counselor will help you move from a 'hot moment to a cool moment'

## 2019-09-15 DIAGNOSIS — F33.3 MDD (MAJOR DEPRESSIVE DISORDER), RECURRENT, SEVERE, WITH PSYCHOSIS (H): ICD-10-CM

## 2019-09-15 DIAGNOSIS — F41.1 GAD (GENERALIZED ANXIETY DISORDER): ICD-10-CM

## 2019-09-16 RX ORDER — VENLAFAXINE HYDROCHLORIDE 150 MG/1
CAPSULE, EXTENDED RELEASE ORAL
Qty: 90 CAPSULE | Refills: 0 | Status: SHIPPED | OUTPATIENT
Start: 2019-09-16 | End: 2019-12-06

## 2019-10-10 ENCOUNTER — APPOINTMENT (OUTPATIENT)
Dept: LAB | Facility: OTHER | Age: 39
End: 2019-10-10
Attending: FAMILY MEDICINE
Payer: COMMERCIAL

## 2019-10-10 ENCOUNTER — OFFICE VISIT (OUTPATIENT)
Dept: FAMILY MEDICINE | Facility: OTHER | Age: 39
End: 2019-10-10
Attending: FAMILY MEDICINE
Payer: COMMERCIAL

## 2019-10-10 ENCOUNTER — PATIENT OUTREACH (OUTPATIENT)
Dept: CARE COORDINATION | Facility: OTHER | Age: 39
End: 2019-10-10

## 2019-10-10 VITALS
HEART RATE: 67 BPM | OXYGEN SATURATION: 99 % | WEIGHT: 130 LBS | BODY MASS INDEX: 23.03 KG/M2 | TEMPERATURE: 98.2 F | DIASTOLIC BLOOD PRESSURE: 78 MMHG | SYSTOLIC BLOOD PRESSURE: 120 MMHG | RESPIRATION RATE: 18 BRPM

## 2019-10-10 DIAGNOSIS — F11.90 OPIOID USE DISORDER: Primary | ICD-10-CM

## 2019-10-10 PROCEDURE — G0463 HOSPITAL OUTPT CLINIC VISIT: HCPCS

## 2019-10-10 PROCEDURE — 80306 DRUG TEST PRSMV INSTRMNT: CPT | Mod: ZL | Performed by: FAMILY MEDICINE

## 2019-10-10 PROCEDURE — 99213 OFFICE O/P EST LOW 20 MIN: CPT | Performed by: FAMILY MEDICINE

## 2019-10-10 RX ORDER — BUPRENORPHINE AND NALOXONE 4; 1 MG/1; MG/1
1 FILM, SOLUBLE BUCCAL; SUBLINGUAL 2 TIMES DAILY
Qty: 28 EACH | Refills: 0 | Status: SHIPPED | OUTPATIENT
Start: 2019-10-10 | End: 2019-10-25

## 2019-10-10 ASSESSMENT — PAIN SCALES - GENERAL: PAINLEVEL: NO PAIN (0)

## 2019-10-10 ASSESSMENT — ACTIVITIES OF DAILY LIVING (ADL): DEPENDENT_IADLS:: INDEPENDENT

## 2019-10-10 NOTE — PROGRESS NOTES
Follow-up Buprenorphine Visit           HPI       Brissa Santana is here for f/u on buprenophine/naloxone (Suboxone) therapy for opioid use disorder.  Suboxone Follow Up    Brissa is currently taking 4/1 mg of Buprenorphine/Naloxone 2 times daily.     Status since last visit:    Since last visit patient has been: doing well.     Intensity:     There has been: no craving.    Suboxone Dose: adequate.    Progression of Symptoms:     Cues to use and relapse none    Recovery program has been: solid.   Accompanying Signs & Symptoms:    Side Effects: fatigue? Worse than when she started. Sleeps well.  Sobriety:     Status: no use since last visit.      Drug Screen: obtained.    Precipitating factors:    Triggers have been: mild.   Alleviating factors:    Contact with sponsor has been: no sponsor.     Family and support system has been: helpful.   Other Therapies Tried :     Patient has been going to recovery meetings:not at all.  Been busy at home and tired.    Patient and household with recent stomach flu and respiratory illness.  Daughter was in ER.  Other medical concerns: No    Any ED visits? No       History   Smoking Status     Former Smoker     Packs/day: 1.00     Years: 15.00     Types: Cigarettes     Quit date: 3/11/2017   Smokeless Tobacco     Never Used     Comment: Tried to quit (yes); longest period tobacco-free- 9 months       Problem, Medication and Allergy Lists were reviewed and updated if needed.  reviewed and are current..    Pharmacy Database reviewed? Yes, 10.10.19, as expected.    Patient is an established patient of this clinic..         Review of Systems:   Review of Systems  CONSTITUTIONAL: NEGATIVE for fever, chills, change in weight  INTEGUMENTARY/SKIN: NEGATIVE for worrisome rashes, moles or lesions  EYES: NEGATIVE for vision changes or irritation  ENT/MOUTH: NEGATIVE for ear, mouth and throat problems  RESP: NEGATIVE for significant cough or SOB  CV: NEGATIVE for chest pain, palpitations or  peripheral edema  GI: NEGATIVE for nausea, abdominal pain, heartburn, or change in bowel habits  : NEGATIVE for frequency, dysuria, or hematuria  MUSCULOSKELETAL: NEGATIVE for significant arthralgias or myalgia  NEURO: NEGATIVE for weakness, dizziness or paresthesias  ENDOCRINE: NEGATIVE for temperature intolerance, skin/hair changes  PSYCHIATRIC: NEGATIVE for changes in mood or affect          Physical Exam:     Vitals:    10/10/19 1005   BP: 120/78   BP Location: Right arm   Patient Position: Sitting   Cuff Size: Adult Regular   Pulse: 67   Resp: 18   Temp: 98.2  F (36.8  C)   TempSrc: Tympanic   SpO2: 99%   Weight: 59 kg (130 lb)     Body mass index is 23.03 kg/m .  Vitals were reviewed   Physical Exam  GENERAL APPEARANCE: alert, comfortable appearing  EYES: Eyes grossly normal to inspection  HENT:  nose no rhinorrhea  RESP: lungs clear to auscultation - no rales, rhonchi or wheezes and no resp distress  CV: regular rates and rhythm, normal S1 S2,no murmur   ABDOMEN:soft, non-tender, non-distended, no hepatosplenomegaly or masses  SKIN: no rashes, no jaundice, no obvious masses.   NEURO:  no tremor  MENTAL STATUS EXAM:  Appearance/Behavior:No apparent distress  Speech: Normal  Mood/Affect: normal affect  Insight: Fair      Results:    Results from the last 24 hoursNo results found for this or any previous visit (from the past 24 hour(s)).    Assessment and Plan     Was naloxone nasal spray prescribed? No Details: prior.  1. Opioid use disorder (H)  Doing well.  No relapse.  Struggling with fatigue - possibly from Suboxone.  Will plan to see again in 2 weeks rather than 4.  May be combination of recent illness as well as medication.  Encouraged to work on structured routine - getting some exercise, volunteer, counseling, considering work.  Lots of time at home.    - Urine Drugs of Abuse Screen (Tox13)    Dosage will not need adjustment today.  Continue at 4 mg  2 time(s) a day of  buprenorphine/naloxone  (Suboxone).     Follow up Plan  Stage 2 (2 weeks): Please make a clinic appointment for follow up with me (TABITHA STATON) or another Suboxone provider in 2 weeks for suboxone follow up.    Greater than 15 minutes was spent with this patient, over half of which was on counseling and coordination of care which includes importance of recovery activities,which may include  attendance at NA/AA meetings, sober support network, and the importance of not isolating, being open, honest, and willing to change, possible triggers and how to avoid them, and managing cravings.    Medications Discontinued During This Encounter   Medication Reason     ondansetron (ZOFRAN-ODT) 8 MG ODT tab        Options for treatment and follow-up care were reviewed with the patient. Brissa engaged in the decision making process and verbalized understanding of the options discussed and agreed with the final plan.    Tabitha Staton MD

## 2019-10-10 NOTE — NURSING NOTE
"Chief Complaint   Patient presents with     opiod use disorder       Initial /78 (BP Location: Right arm, Patient Position: Sitting, Cuff Size: Adult Regular)   Pulse 67   Temp 98.2  F (36.8  C) (Tympanic)   Resp 18   Wt 59 kg (130 lb)   SpO2 99%   BMI 23.03 kg/m   Estimated body mass index is 23.03 kg/m  as calculated from the following:    Height as of 7/25/19: 1.6 m (5' 3\").    Weight as of this encounter: 59 kg (130 lb).  Medication Reconciliation: complete  Conchis Serra LPN  "

## 2019-10-10 NOTE — PROGRESS NOTES
Clinic Care Coordination Contact  Care Team Conversations    CC reviewed chart this date and discussed case with Dr. Stack.  Doing ok.  Fatigued.  Will follow up in 2 weeks.      Vera Robert RN-BSN  Chronic Pain Care Coordinator  [phone] opt. #3

## 2019-10-25 ENCOUNTER — TELEPHONE (OUTPATIENT)
Dept: FAMILY MEDICINE | Facility: OTHER | Age: 39
End: 2019-10-25
Payer: COMMERCIAL

## 2019-10-25 ENCOUNTER — HOSPITAL ENCOUNTER (EMERGENCY)
Facility: HOSPITAL | Age: 39
Discharge: HOME | End: 2019-10-25
Attending: NURSE PRACTITIONER | Admitting: NURSE PRACTITIONER
Payer: COMMERCIAL

## 2019-10-25 VITALS
DIASTOLIC BLOOD PRESSURE: 86 MMHG | TEMPERATURE: 98 F | SYSTOLIC BLOOD PRESSURE: 122 MMHG | RESPIRATION RATE: 16 BRPM | OXYGEN SATURATION: 100 %

## 2019-10-25 DIAGNOSIS — F11.99 OPIOID USE WITH OPIOID-INDUCED DISORDER (H): ICD-10-CM

## 2019-10-25 DIAGNOSIS — Z76.0 ENCOUNTER FOR MEDICATION REFILL: Primary | ICD-10-CM

## 2019-10-25 DIAGNOSIS — F11.90 OPIOID USE DISORDER: ICD-10-CM

## 2019-10-25 PROCEDURE — G0463 HOSPITAL OUTPT CLINIC VISIT: HCPCS

## 2019-10-25 PROCEDURE — 80306 DRUG TEST PRSMV INSTRMNT: CPT | Performed by: FAMILY MEDICINE

## 2019-10-25 PROCEDURE — 99213 OFFICE O/P EST LOW 20 MIN: CPT | Mod: Z6 | Performed by: NURSE PRACTITIONER

## 2019-10-25 RX ORDER — BUPRENORPHINE AND NALOXONE 4; 1 MG/1; MG/1
1 FILM, SOLUBLE BUCCAL; SUBLINGUAL 2 TIMES DAILY
Qty: 16 EACH | Refills: 0 | Status: SHIPPED | OUTPATIENT
Start: 2019-10-25 | End: 2019-11-01

## 2019-10-25 ASSESSMENT — ENCOUNTER SYMPTOMS
NAUSEA: 0
SHORTNESS OF BREATH: 0
VOMITING: 0

## 2019-10-25 NOTE — ED TRIAGE NOTES
Pt presents with needing her suboxone refilled due to missing her clinic appointment yesterday. She is out of suboxone today.

## 2019-10-25 NOTE — ED PROVIDER NOTES
History     Chief Complaint   Patient presents with     Medication Refill     requesting medication refill due to missed her appt at the clinic yesterday     HPI  Brissa Santana is a 39 year old female who presents to  for medication refill. She had an appointment with PCP yesterday to get subuxone but missed appointment due to lack of transportation. She states she came here and spoke to  who called and nurse and was informed to go to urgent care to have her Suboxone prescription refilled for the weekend.  Denies chest pain, shortness of breath, nausea or vomiting.    Allergies:  Allergies   Allergen Reactions     Benzodiazepines      On Suboxone - not in pt's best interest to receives Benzos     Latex      Irritation; see comments       Problem List:    Patient Active Problem List    Diagnosis Date Noted     ACP (advance care planning) 07/14/2016     Priority: Medium     Advance Care Planning 7/14/2016: ACP Review of Chart / Resources Provided:  Reviewed chart for advance care plan.  Brissa Santana has no plan or code status on file. Discussed available resources and provided with information.   Added by Corbin Valencia             Uncomplicated opioid dependence (H) 07/14/2016     Priority: Medium     MDD (major depressive disorder), recurrent, severe, with psychosis (H)      Priority: Medium     FRANCIA (generalized anxiety disorder)      Priority: Medium     Drug overdose 02/19/2016     Priority: Medium     Inflammation of sacroiliac joint (H) 10/16/2015     Priority: Medium     Insomnia 11/21/2013     Priority: Medium     Neck pain 05/20/2013     Priority: Medium     History of reduction mammoplasty 05/20/2013     Priority: Medium     History of spinal surgery 05/20/2013     Priority: Medium     Tramadol dependency and abuse 03/16/2011     Priority: Medium        Past Medical History:    Past Medical History:   Diagnosis Date     Backache, unspecified 01/22/2007     Chemical dependency (H)  2016     FRANCIA (generalized anxiety disorder)      Heroin use 2013     Lumbago 2003     MDD (major depressive disorder), recurrent, severe, with psychosis (H)      Polypharmacy -h/o tramadol,ambien,narcotic depend. 2013     Tramadol dependency and abuse 2011       Past Surgical History:    Past Surgical History:   Procedure Laterality Date     APPENDECTOMY       BACK SURGERY      lumbar diskectomy with fusion      SECTION      x2     COLONOSCOPY N/A 2018    Procedure: COLONOSCOPY;  DIAGNOSTIC COLONOSCOPY TOTAL HEMORRHOIDECTOMY;  Surgeon: Sharif Yeboah MD;  Location: [facility]     D & C       HEMORRHOIDECTOMY INTERNAL N/A 2018    Procedure: HEMORRHOIDECTOMY INTERNAL;;  Surgeon: Sharif Yeboah MD;  Location: [facility] OR     OTHER SURGICAL HISTORY      Breast Reduction     TONSILLECTOMY       TUBAL LIGATION         Family History:    Family History   Problem Relation Age of Onset     Breast Cancer Maternal Grandmother      Cancer Paternal Grandmother      Hepatitis Paternal Uncle         Hep C     Hepatitis Father         Hep C       Social History:  Marital Status:  Single [1]  Social History     Tobacco Use     Smoking status: Former Smoker     Packs/day: 1.00     Years: 15.00     Pack years: 15.00     Types: Cigarettes     Last attempt to quit: 3/11/2017     Years since quittin.6     Smokeless tobacco: Never Used     Tobacco comment: Tried to quit (yes); longest period tobacco-free- 9 months   Substance Use Topics     Alcohol use: No     Drug use: No        Medications:    clobetasol (TEMOVATE) 0.05 % external cream  venlafaxine (EFFEXOR-XR) 150 MG 24 hr capsule  buprenorphine HCl-naloxone HCl (SUBOXONE) 4-1 MG per film  naloxone (NARCAN) 4 MG/0.1ML nasal spray          Review of Systems   Respiratory: Negative for shortness of breath.    Cardiovascular: Negative for chest pain.   Gastrointestinal: Negative for nausea and vomiting.   All other systems reviewed and are  negative.      Physical Exam   BP: 122/86  Heart Rate: 93  Temp: 98  F (36.7  C)  Resp: 16  SpO2: 100 %      Physical Exam  Vitals signs and nursing note reviewed.   Constitutional:       General: She is not in acute distress.     Appearance: Normal appearance. She is not ill-appearing, toxic-appearing or diaphoretic.   Cardiovascular:      Rate and Rhythm: Normal rate and regular rhythm.      Heart sounds: Normal heart sounds.   Pulmonary:      Effort: Pulmonary effort is normal.      Breath sounds: Normal breath sounds. No wheezing, rhonchi or rales.   Chest:      Chest wall: No tenderness.   Abdominal:      Palpations: Abdomen is soft.   Neurological:      Mental Status: She is alert.         ED Course        Procedures               No results found for this or any previous visit (from the past 24 hour(s)).    Medications - No data to display    Assessments & Plan (with Medical Decision Making)     I have reviewed the nursing notes.    I have reviewed the findings, diagnosis, plan and need for follow up with the patient.  Encounter for medication refill  Patient presents to urgent care requesting refill of Suboxone prescription after having missed her appointment at the clinic yesterday due to lack of transportation.  Spoke with Vera MCGOWAN, who does care coordination for patients receiving Suboxone through the clinic, she recommended that patient call her to have set up an appointment.  Patient informed to call Vera MCGOWAN, in the clinic to set up an appointment.  Discussed with patient that she needs to follow-up with Dr. Stack for her refills for Suboxone in the future.  Return to emergency department for worsening or concerning symptoms.  Patient verbalized understanding.    Final diagnoses:   Encounter for medication refill       10/25/2019   [facility]     Audrey Layton, CNP  10/25/19 8936

## 2019-10-25 NOTE — DISCHARGE INSTRUCTIONS
Follow-up with Dr. Stack for continued Suboxone refills.    Return to emergency department for worsening or concerning symptoms.

## 2019-10-25 NOTE — TELEPHONE ENCOUNTER
Pt currently in urgent care - missed her appt yesterday and is in need of a Suboxone refill.  1 week of med pended.  Appt made for next Friday, 2:15PM, arrive at 2:00PM.  Also, urine ordered.  Will have pt leave urine this date.

## 2019-10-25 NOTE — TELEPHONE ENCOUNTER
BHARATH: CC spoke with pt.  She did admit to using meth once a week ago.  Was remorseful.  Did it because she literally had no energy - hard time falling asleep.  No plan on doing it again.

## 2019-10-25 NOTE — ED AVS SNAPSHOT
[facility]  [address] [address]  Phone:  [phone]                                    Brissa Santana   MRN: [id number]    Department:  [facility]   Date of Visit:  10/25/2019           After Visit Summary Signature Page    I have received my discharge instructions, and my questions have been answered. I have discussed any challenges I see with this plan with the nurse or doctor.    ..........................................................................................................................................  Patient/Patient Representative Signature      ..........................................................................................................................................  Patient Representative Print Name and Relationship to Patient    ..................................................               ................................................  Date                                   Time    ..........................................................................................................................................  Reviewed by Signature/Title    ...................................................              ..............................................  Date                                               Time          22EPIC Rev 08/18

## 2019-10-30 NOTE — PROGRESS NOTES
Follow-up Buprenorphine Visit           HPI       Brissa Santana is here for f/u on buprenophine/naloxone (Suboxone) therapy for opioid use disorder.  Suboxone Follow Up    Brissa is currently taking 4/1 mg of Buprenorphine/Naloxone 2 times daily.     Status since last visit:    Since last visit patient has been: stable.     Intensity:     There has been: moderate craving.      Suboxone Dose: adequate.    Progression of Symptoms:     Cues to use and relapse     Recovery program has been: active.   Accompanying Signs & Symptoms:    Side Effects: fatigue.    Sobriety:     Status: patient has relapsed.  Sought out meth x 1 - was so fatigued; did keep awake, but made feel poor    Drug Screen: obtained.    Precipitating factors:    Triggers have been: mild.   Alleviating factors:    Contact with sponsor has been: no sponsor.     Family and support system has been: helpful.   Other Therapies Tried :     Patient has been going to recovery meetings:not at all.      Other medical concerns: No    Any ED visits? No     Sleeps well, but too much.  Tired all the time.    Snoring.  No witnessed apnea.      History   Smoking Status     Former Smoker     Packs/day: 1.00     Years: 15.00     Types: Cigarettes     Quit date: 3/11/2017   Smokeless Tobacco     Never Used     Comment: Tried to quit (yes); longest period tobacco-free- 9 months       Problem, Medication and Allergy Lists were reviewed and updated if needed.  reviewed and are current..    Pharmacy Database reviewed? Yes, 11.1.19, as expected.  Last fill 10.25.19 #16 - 8 day supply.    Patient is an established patient of this clinic..         Review of Systems:   Review of Systems  CONSTITUTIONAL: NEGATIVE for fever, chills, change in weight  INTEGUMENTARY/SKIN: NEGATIVE for worrisome rashes, moles or lesions  EYES: NEGATIVE for vision changes or irritation  ENT/MOUTH: NEGATIVE for ear, mouth and throat problems  RESP: NEGATIVE for significant cough or SOB  CV: NEGATIVE  for chest pain, palpitations or peripheral edema  GI: NEGATIVE for nausea, abdominal pain, heartburn, or change in bowel habits  : NEGATIVE for frequency, dysuria, or hematuria  MUSCULOSKELETAL: NEGATIVE for significant arthralgias or myalgia  NEURO: NEGATIVE for weakness, dizziness or paresthesias  ENDOCRINE: NEGATIVE for temperature intolerance, skin/hair changes  PSYCHIATRIC: NEGATIVE for changes in mood or affect          Physical Exam:     Vitals:    11/01/19 1433   BP: 125/72   BP Location: Right arm   Patient Position: Chair   Cuff Size: Adult Regular   Pulse: 82   Temp: 97.8  F (36.6  C)   TempSrc: Tympanic   SpO2: 98%   Weight: 61.7 kg (136 lb)     Body mass index is 24.09 kg/m .  Vitals were reviewed and were normal  Vitals were reviewed   Physical Exam  GENERAL APPEARANCE: alert, comfortable appearing  EYES: Eyes grossly normal to inspection  HENT:  nose no rhinorrhea  RESP: lungs clear to auscultation - no rales, rhonchi or wheezes and no resp distress  CV: regular rates and rhythm, normal S1 S2,no murmur   ABDOMEN:soft, non-tender, non-distended, no hepatosplenomegaly or masses  SKIN: no rashes, no jaundice, no obvious masses.   NEURO:  no tremor  MENTAL STATUS EXAM:  Appearance/Behavior:No apparent distress  Speech: Normal  Mood/Affect: normal affect  Insight: Fair      Results:    Results from the last 24 hours  Results for orders placed or performed in visit on 11/01/19 (from the past 24 hour(s))   Urine Drugs of Abuse Screen (Tox13)   Result Value Ref Range    Cannabinoids (32-dok-6-carboxy-9-THC) Not Detected NDET^Not Detected ng/mL    Phencyclidine (Phencyclidine) Not Detected NDET^Not Detected ng/mL    Cocaine (Benzoylecgonine) Not Detected NDET^Not Detected ng/mL    Methamphetamine (d-Methamphetamine) Not Detected NDET^Not Detected ng/mL    Opiates (Morphine) Not Detected NDET^Not Detected ng/mL    Amphetamine (d-Amphetamine) Not Detected NDET^Not Detected ng/mL    Benzodiazepines  (Nordiazepam) Not Detected NDET^Not Detected ng/mL    Tricyclic Antidepressants (Desipramine) Not Detected NDET^Not Detected ng/mL    Methadone (Methadone) Not Detected NDET^Not Detected ng/mL    Barbiturates (Butalbital) Not Detected NDET^Not Detected ng/mL    Oxycodone (Oxycodone) Not Detected NDET^Not Detected ng/mL    Propoxyphene (Norpropoxyphene) Not Detected NDET^Not Detected ng/mL    Buprenorphine (Buprenorphine) Detected, Abnormal Result (A) NDET^Not Detected ng/mL       Assessment and Plan     Was naloxone nasal spray prescribed? No Details: has some at home.  1. Opioid use disorder (H)  No relapse of opioids.    1 relapse of meth - small amount - did not show up on drug testing today.  Congratulated on honesty.  Needs to start looking into more structure - job, etc.  - Urine Drugs of Abuse Screen (Tox13)  - buprenorphine HCl-naloxone HCl (SUBOXONE) 4-1 MG per film; Place 1 Film under the tongue 2 times daily  Dispense: 42 each; Refill: 0    2. Chronic fatigue  May be side effect of Suboxone?  Consider sleep study - MARIA EUGENIA, narcolepsy, etc.  - Vitamin D Deficiency  - TSH with free T4 reflex    Dosage will not need adjustment today.  Continue at 4/1 mg  2 time(s) a day of  buprenorphine/naloxone (Suboxone).     Follow up Plan  Stage 2 (3 weeks): Please make a clinic appointment for follow up with me (SANDEEP STATON) or another Suboxone provider in 3 weeks for suboxone follow up.    Greater than 15 minutes was spent with this patient, over half of which was on counseling and coordination of care which includes importance of recovery activities,which may include  attendance at NA/AA meetings, sober support network, and the importance of not isolating, being open, honest, and willing to change, possible triggers and how to avoid them, and managing cravings.    Medications Discontinued During This Encounter   Medication Reason     buprenorphine HCl-naloxone HCl (SUBOXONE) 4-1 MG per film Reorder       Options  for treatment and follow-up care were reviewed with the patient. Brissa engaged in the decision making process and verbalized understanding of the options discussed and agreed with the final plan.    Tabitha Sommers MD

## 2019-11-01 ENCOUNTER — OFFICE VISIT (OUTPATIENT)
Dept: FAMILY MEDICINE | Facility: OTHER | Age: 39
End: 2019-11-01
Attending: FAMILY MEDICINE
Payer: COMMERCIAL

## 2019-11-01 ENCOUNTER — PATIENT OUTREACH (OUTPATIENT)
Dept: CARE COORDINATION | Facility: OTHER | Age: 39
End: 2019-11-01

## 2019-11-01 VITALS
HEART RATE: 82 BPM | DIASTOLIC BLOOD PRESSURE: 72 MMHG | WEIGHT: 136 LBS | SYSTOLIC BLOOD PRESSURE: 125 MMHG | BODY MASS INDEX: 24.09 KG/M2 | TEMPERATURE: 97.8 F | OXYGEN SATURATION: 98 %

## 2019-11-01 DIAGNOSIS — Z00.00 ROUTINE GENERAL MEDICAL EXAMINATION AT A HEALTH CARE FACILITY: ICD-10-CM

## 2019-11-01 DIAGNOSIS — R53.82 CHRONIC FATIGUE: ICD-10-CM

## 2019-11-01 DIAGNOSIS — F11.90 OPIOID USE DISORDER: Primary | ICD-10-CM

## 2019-11-01 DIAGNOSIS — E55.9 VITAMIN D DEFICIENCY: ICD-10-CM

## 2019-11-01 LAB
AMPHETAMINES UR QL: NOT DETECTED NG/ML
BARBITURATES UR QL SCN: NOT DETECTED NG/ML
BENZODIAZ UR QL SCN: NOT DETECTED NG/ML
BUPRENORPHINE UR QL: ABNORMAL NG/ML
CANNABINOIDS UR QL: NOT DETECTED NG/ML
COCAINE UR QL SCN: NOT DETECTED NG/ML
D-METHAMPHET UR QL: NOT DETECTED NG/ML
METHADONE UR QL SCN: NOT DETECTED NG/ML
OPIATES UR QL SCN: NOT DETECTED NG/ML
OXYCODONE UR QL SCN: NOT DETECTED NG/ML
PCP UR QL SCN: NOT DETECTED NG/ML
PROPOXYPH UR QL: NOT DETECTED NG/ML
TRICYCLICS UR QL SCN: NOT DETECTED NG/ML
TSH SERPL DL<=0.005 MIU/L-ACNC: 2.92 MU/L (ref 0.4–4)

## 2019-11-01 PROCEDURE — 90686 IIV4 VACC NO PRSV 0.5 ML IM: CPT | Performed by: FAMILY MEDICINE

## 2019-11-01 PROCEDURE — 80306 DRUG TEST PRSMV INSTRMNT: CPT | Mod: ZL | Performed by: FAMILY MEDICINE

## 2019-11-01 PROCEDURE — 84443 ASSAY THYROID STIM HORMONE: CPT | Mod: ZL | Performed by: FAMILY MEDICINE

## 2019-11-01 PROCEDURE — 82306 VITAMIN D 25 HYDROXY: CPT | Mod: ZL | Performed by: FAMILY MEDICINE

## 2019-11-01 PROCEDURE — 36415 COLL VENOUS BLD VENIPUNCTURE: CPT | Mod: ZL | Performed by: FAMILY MEDICINE

## 2019-11-01 PROCEDURE — 90686 IIV4 VACC NO PRSV 0.5 ML IM: CPT

## 2019-11-01 PROCEDURE — 99214 OFFICE O/P EST MOD 30 MIN: CPT | Performed by: FAMILY MEDICINE

## 2019-11-01 PROCEDURE — G0463 HOSPITAL OUTPT CLINIC VISIT: HCPCS

## 2019-11-01 PROCEDURE — 90471 IMMUNIZATION ADMIN: CPT

## 2019-11-01 PROCEDURE — G0463 HOSPITAL OUTPT CLINIC VISIT: HCPCS | Mod: 25

## 2019-11-01 RX ORDER — BUPRENORPHINE AND NALOXONE 4; 1 MG/1; MG/1
1 FILM, SOLUBLE BUCCAL; SUBLINGUAL 2 TIMES DAILY
Qty: 42 EACH | Refills: 0 | Status: SHIPPED | OUTPATIENT
Start: 2019-11-01 | End: 2019-11-29

## 2019-11-01 ASSESSMENT — PAIN SCALES - GENERAL: PAINLEVEL: NO PAIN (0)

## 2019-11-01 ASSESSMENT — ACTIVITIES OF DAILY LIVING (ADL): DEPENDENT_IADLS:: INDEPENDENT

## 2019-11-01 NOTE — PROGRESS NOTES
Clinic Care Coordination Contact    Follow Up Progress Note      Assessment: CC attended office visit with pt and Dr. Stack this date.  Pleaser refer to Dr. Stack's note for plan of care.       Intervention/Education provided during outreach: Continuing to work on sobriety/recovery.  Encouraged counseling/[facility].     Outreach Frequency: monthly    Plan:   Encouraged her to call/text CC with any questions/concerns/problems.  Verbalized understanding.  Care Coordinator will follow up in 1 month.    Vera Robert RN-BSN  Chronic Pain Care Coordinator  [phone] opt. #3

## 2019-11-01 NOTE — PATIENT INSTRUCTIONS
Will call with labs.  Continue current dose.  Flu shot given.  Consider sleep study.  Preventive Health Recommendations  Female Ages 40 to 49    Yearly exam:     See your health care provider every year in order to  1. Review health changes.   2. Discuss preventive care.    3. Review your medicines if your doctor prescribed any.      Get a Pap test every three years (unless you have an abnormal result and your provider advises testing more often).      If you get Pap tests with HPV test, you only need to test every 5 years, unless you have an abnormal result. You do not need a Pap test if your uterus was removed (hysterectomy) and you have not had cancer.      You should be tested each year for STDs (sexually transmitted diseases), if you're at risk.     Ask your doctor if you should have a mammogram.      Have a colonoscopy (test for colon cancer) if someone in your family has had colon cancer or polyps before age 50.       Have a cholesterol test every 5 years.       Have a diabetes test (fasting glucose) after age 45. If you are at risk for diabetes, you should have this test every 3 years.    Shots: Get a flu shot each year. Get a tetanus shot every 10 years.     Nutrition:     Eat at least 5 servings of fruits and vegetables each day.    Eat whole-grain bread, whole-wheat pasta and brown rice instead of white grains and rice.    Get adequate Calcium and Vitamin D.      Lifestyle    Exercise at least 150 minutes a week (an average of 30 minutes a day, 5 days a week). This will help you control your weight and prevent disease.    Limit alcohol to one drink per day.    No smoking.     Wear sunscreen to prevent skin cancer.    See your dentist every six months for an exam and cleaning.

## 2019-11-01 NOTE — NURSING NOTE
"Chief Complaint   Patient presents with     RECHECK       Initial /72 (BP Location: Right arm, Patient Position: Chair, Cuff Size: Adult Regular)   Pulse 82   Temp 97.8  F (36.6  C) (Tympanic)   Wt 61.7 kg (136 lb)   SpO2 98%   BMI 24.09 kg/m   Estimated body mass index is 24.09 kg/m  as calculated from the following:    Height as of 7/25/19: 1.6 m (5' 3\").    Weight as of this encounter: 61.7 kg (136 lb).  Medication Reconciliation: complete  Sara Hamilton LPN  "

## 2019-11-01 NOTE — PROGRESS NOTES
SUBJECTIVE:   CC: Brissa Santana is an 39 year old woman who presents for preventive health visit.     Healthy Habits:    Do you get at least three servings of calcium containing foods daily (dairy, green leafy vegetables, etc.)?     Amount of exercise or daily activities, outside of work:     Problems taking medications regularly     Medication side effects:     Have you had an eye exam in the past two years?     Do you see a dentist twice per year?     Do you have sleep apnea, excessive snoring or daytime drowsiness?          Today's PHQ-2 Score: No flowsheet data found.    Abuse: Current or Past(Physical, Sexual or Emotional)-   Do you feel safe in your environment?         Social History     Tobacco Use     Smoking status: Former Smoker     Packs/day: 1.00     Years: 15.00     Pack years: 15.00     Types: Cigarettes     Last attempt to quit: 3/11/2017     Years since quittin.6     Smokeless tobacco: Never Used     Tobacco comment: Tried to quit (yes); longest period tobacco-free- 9 months   Substance Use Topics     Alcohol use: No     If you drink alcohol do you typically have >3 drinks per day or >7 drinks per week?                      Reviewed orders with patient.  Reviewed health maintenance and updated orders accordingly -           Pertinent mammograms are reviewed under the imaging tab.  History of abnormal Pap smear:   PAP / HPV Latest Ref Rng & Units 10/4/2018 2017 3/11/2014   PAP - NIL LSIL(A) NIL   HPV 16 DNA NEG:Negative Negative Negative -   HPV 18 DNA NEG:Negative Negative Negative -   OTHER HR HPV NEG:Negative Positive(A) Positive(A) -     Reviewed and updated as needed this visit by clinical staff  Allergies  Meds         Reviewed and updated as needed this visit by Provider  Allergies  Meds            ROS:      OBJECTIVE:   /72 (BP Location: Right arm, Patient Position: Chair, Cuff Size: Adult Regular)   Pulse 82   Temp 97.8  F (36.6  C) (Tympanic)   Wt 61.7 kg (136 lb)  "  SpO2 98%   BMI 24.09 kg/m    EXAM:          ASSESSMENT/PLAN:       COUNSELING:       Estimated body mass index is 24.09 kg/m  as calculated from the following:    Height as of 7/25/19: 1.6 m (5' 3\").    Weight as of this encounter: 61.7 kg (136 lb).         reports that she quit smoking about 2 years ago. Her smoking use included cigarettes. She has a 15.00 pack-year smoking history. She has never used smokeless tobacco.      Counseling Resources:  ATP IV Guidelines  Pooled Cohorts Equation Calculator  Breast Cancer Risk Calculator  FRAX Risk Assessment  ICSI Preventive Guidelines  Dietary Guidelines for Americans, 2010  USDA's MyPlate  ASA Prophylaxis  Lung CA Screening    Tabitha Sommers MD  [facility] [place]  "

## 2019-11-05 LAB — DEPRECATED CALCIDIOL+CALCIFEROL SERPL-MC: 24 UG/L (ref 20–75)

## 2019-11-06 PROBLEM — E55.9 VITAMIN D DEFICIENCY: Status: ACTIVE | Noted: 2019-11-06

## 2019-11-06 RX ORDER — ERGOCALCIFEROL 1.25 MG/1
50000 CAPSULE, LIQUID FILLED ORAL WEEKLY
Qty: 8 CAPSULE | Refills: 0 | Status: SHIPPED | OUTPATIENT
Start: 2019-11-06 | End: 2021-04-07

## 2019-11-29 ENCOUNTER — TELEPHONE (OUTPATIENT)
Dept: FAMILY MEDICINE | Facility: OTHER | Age: 39
End: 2019-11-29

## 2019-11-29 DIAGNOSIS — F11.90 OPIOID USE DISORDER: ICD-10-CM

## 2019-11-29 RX ORDER — BUPRENORPHINE AND NALOXONE 4; 1 MG/1; MG/1
1 FILM, SOLUBLE BUCCAL; SUBLINGUAL 2 TIMES DAILY
Qty: 6 EACH | Refills: 0 | Status: SHIPPED | OUTPATIENT
Start: 2019-11-29 | End: 2019-12-06

## 2019-11-29 NOTE — TELEPHONE ENCOUNTER
3:32 PM    Reason for Call: OVERBOOK    Patient is having the following symptoms: needs suboxone. Pt usually sees Tabitha Bargeronald for this- PT would like to know if she can come in right now and do that.     The patient is requesting an appointment for suboxone with Tabitha Sommers.    Was an appointment offered for this call? No  If yes : Appointment type              Date    Preferred method for responding to this message: Telephone Call  What is your phone number ?  [phone] Brissa     If we cannot reach you directly, may we leave a detailed response at the number you provided? Yes    Can this message wait until your PCP/provider returns, if unavailable today? Not applicable, Sommers is in    [facility] [place]

## 2019-12-02 ENCOUNTER — TELEPHONE (OUTPATIENT)
Dept: FAMILY MEDICINE | Facility: OTHER | Age: 39
End: 2019-12-02

## 2019-12-02 NOTE — PROGRESS NOTES
Follow-up Buprenorphine Visit           HPI       Brissa Santana is here for f/u on buprenophine/naloxone (Suboxone) therapy for opioid use disorder.  Suboxone Follow Up    Brissa is currently taking 4/1 mg of Buprenorphine/Naloxone 2 times daily.     Has been out.    Used meth a couple times.  Last use yesterday morning.  Took suboxone off the streets.  Took some day.  Had bought a 12 and taking pieces.    Withdrawal - anxiety, dizziness, restless legs, sweats.    Status since last visit:    Since last visit patient has been: struggling.     Intensity:     There has been: no craving for opiates     Suboxone Dose: adequate.    Progression of Symptoms:     Cues to use and relapse - running out    Recovery program has been: weak.   Accompanying Signs & Symptoms:    Side Effects: none.    Sobriety:     Status: patient has relapsed.  Meth     Drug Screen: obtained.    Precipitating factors:    Triggers have been:  Alleviating factors:    Contact with sponsor has been: no sponsor. Never has had one.    Family and support system has been: supportive, but isolates herself.   Other Therapies Tried :     Patient has been going to recovery meetings:not at all - remotely has    Other medical concerns: No    Any ED visits? No     Difficulty with no 's license.  Limited transportation.  Lives in [place]. Missing appointment - needing to reschedule.    Neighbor does meth - so patient knows it is there.  Has told him to not give it to her even if she asks for it.    Does feel she can get rides, but admits to her own procrastination, etc.      History   Smoking Status     Former Smoker     Packs/day: 1.00     Years: 15.00     Types: Cigarettes     Quit date: 3/11/2017   Smokeless Tobacco     Never Used     Comment: Tried to quit (yes); longest period tobacco-free- 9 months       Problem, Medication and Allergy Lists were reviewed and updated if needed.  reviewed and are current..    Pharmacy Database reviewed?     Patient  is an established patient of this clinic..         Review of Systems:   Review of Systems  CONSTITUTIONAL: NEGATIVE for fever, chills, change in weight  INTEGUMENTARY/SKIN: NEGATIVE for worrisome rashes, moles or lesions  EYES: NEGATIVE for vision changes or irritation  ENT/MOUTH: NEGATIVE for ear, mouth and throat problems  RESP: NEGATIVE for significant cough or SOB  CV: NEGATIVE for chest pain, palpitations or peripheral edema  GI: NEGATIVE for nausea, abdominal pain, heartburn, or change in bowel habits  : NEGATIVE for frequency, dysuria, or hematuria  MUSCULOSKELETAL: NEGATIVE for significant arthralgias or myalgia  NEURO: NEGATIVE for weakness, dizziness or paresthesias  ENDOCRINE: NEGATIVE for temperature intolerance, skin/hair changes  PSYCHIATRIC: NEGATIVE for changes in mood or affect          Physical Exam:     Vitals:    12/06/19 1418   BP: 130/84   BP Location: Left arm   Patient Position: Chair   Cuff Size: Adult Regular   Pulse: 108   Resp: 20   Temp: 99  F (37.2  C)   TempSrc: Tympanic   SpO2: 99%   Weight: 59.2 kg (130 lb 9.6 oz)     Body mass index is 23.13 kg/m .  Vitals were reviewed   Physical Exam  GENERAL APPEARANCE: alert, comfortable appearing  EYES: Eyes grossly normal to inspection  HENT:  nose no rhinorrhea  RESP: lungs clear to auscultation - no rales, rhonchi or wheezes and no resp distress  CV: regular rates and rhythm, normal S1 S2,no murmur   ABDOMEN:soft, non-tender, non-distended, no hepatosplenomegaly or masses  SKIN: no rashes, no jaundice, no obvious masses.   NEURO:  no tremor  MENTAL STATUS EXAM:  Appearance/Behavior:No apparent distress  Speech: Normal  Mood/Affect: anxious  Insight: Fair      Results:    Results from the last 24 hoursNo results found for this or any previous visit (from the past 24 hour(s)).    Assessment and Plan     Was naloxone nasal spray prescribed? prior  1. Opioid use disorder (H)  Ongoing struggles as of late.  Patient has a lot of down time - gets  in her own head.    Then has neighbor friend whom she knows uses methamphetamines.    Has no transportation and lives in [place] - so has to arrange rides.      She is interested in trying to set up dual visits - seeing counselor here at the clinic on the same days as her MAT with me.  Will do [facility] referral.    Discussed potential Remeron for methamphetamine use.  She is on Effexor XR.  She would like to wean down on this with the plan of trying Remeron in the future.    Would like to see her weekly or biweekly, however, given issues with transportation, she then often finds herself running out, which is risk for repeat substance use.    - Urine Drugs of Abuse Screen (Tox13)  - buprenorphine HCl-naloxone HCl (SUBOXONE) 4-1 MG per film; Place 1 Film under the tongue 2 times daily  Dispense: 56 each; Refill: 0    2. MDD (major depressive disorder), recurrent, severe, with psychosis (H)    - venlafaxine (EFFEXOR-XR) 75 MG 24 hr capsule; TAKE 1 CAPSULE BY MOUTH DAILY  Dispense: 30 capsule; Refill: 1    3. FRANCIA (generalized anxiety disorder)    - venlafaxine (EFFEXOR-XR) 75 MG 24 hr capsule; TAKE 1 CAPSULE BY MOUTH DAILY  Dispense: 30 capsule; Refill: 1    Dosage will not need adjustment today.  Continue at 4 mg  2 time(s) a day of  buprenorphine/naloxone (Suboxone).     Follow up Plan  Stage 3 (4 weeks): Please make a clinic appointment for follow up with me (SANDEEP STATON) or another Suboxone provider in 1 month for suboxone follow up.    Greater than 25 minutes was spent with this patient, over half of which was on counseling and coordination of care which includes importance of recovery activities,which may include  attendance at NA/AA meetings, sober support network, and the importance of not isolating, being open, honest, and willing to change, possible triggers and how to avoid them, and managing cravings.    Medications Discontinued During This Encounter   Medication Reason     venlafaxine (EFFEXOR-XR) 150 MG  24 hr capsule Reorder     buprenorphine HCl-naloxone HCl (SUBOXONE) 4-1 MG per film Reorder       Options for treatment and follow-up care were reviewed with the patient. Brissa engaged in the decision making process and verbalized understanding of the options discussed and agreed with the final plan.    Tabitha Sommers MD

## 2019-12-02 NOTE — TELEPHONE ENCOUNTER
This was taken care of on 11/29/2019, and patient has a follow up with Dr Stack today (12/2/2019).

## 2019-12-06 ENCOUNTER — OFFICE VISIT (OUTPATIENT)
Dept: FAMILY MEDICINE | Facility: OTHER | Age: 39
End: 2019-12-06
Attending: FAMILY MEDICINE
Payer: COMMERCIAL

## 2019-12-06 ENCOUNTER — APPOINTMENT (OUTPATIENT)
Dept: LAB | Facility: OTHER | Age: 39
End: 2019-12-06
Attending: FAMILY MEDICINE
Payer: COMMERCIAL

## 2019-12-06 VITALS
WEIGHT: 130.6 LBS | RESPIRATION RATE: 20 BRPM | SYSTOLIC BLOOD PRESSURE: 130 MMHG | BODY MASS INDEX: 23.13 KG/M2 | DIASTOLIC BLOOD PRESSURE: 84 MMHG | HEART RATE: 108 BPM | OXYGEN SATURATION: 99 % | TEMPERATURE: 99 F

## 2019-12-06 DIAGNOSIS — F41.1 GAD (GENERALIZED ANXIETY DISORDER): ICD-10-CM

## 2019-12-06 DIAGNOSIS — F33.3 MDD (MAJOR DEPRESSIVE DISORDER), RECURRENT, SEVERE, WITH PSYCHOSIS (H): ICD-10-CM

## 2019-12-06 DIAGNOSIS — F11.90 OPIOID USE DISORDER: Primary | ICD-10-CM

## 2019-12-06 LAB
AMPHETAMINES UR QL: ABNORMAL NG/ML
BARBITURATES UR QL SCN: NOT DETECTED NG/ML
BENZODIAZ UR QL SCN: NOT DETECTED NG/ML
BUPRENORPHINE UR QL: ABNORMAL NG/ML
CANNABINOIDS UR QL: NOT DETECTED NG/ML
COCAINE UR QL SCN: NOT DETECTED NG/ML
D-METHAMPHET UR QL: ABNORMAL NG/ML
METHADONE UR QL SCN: NOT DETECTED NG/ML
OPIATES UR QL SCN: NOT DETECTED NG/ML
OXYCODONE UR QL SCN: NOT DETECTED NG/ML
PCP UR QL SCN: NOT DETECTED NG/ML
PROPOXYPH UR QL: NOT DETECTED NG/ML
TRICYCLICS UR QL SCN: NOT DETECTED NG/ML

## 2019-12-06 PROCEDURE — 80306 DRUG TEST PRSMV INSTRMNT: CPT | Mod: ZL | Performed by: FAMILY MEDICINE

## 2019-12-06 PROCEDURE — 99214 OFFICE O/P EST MOD 30 MIN: CPT | Performed by: FAMILY MEDICINE

## 2019-12-06 PROCEDURE — G0463 HOSPITAL OUTPT CLINIC VISIT: HCPCS

## 2019-12-06 RX ORDER — VENLAFAXINE HYDROCHLORIDE 75 MG/1
CAPSULE, EXTENDED RELEASE ORAL
Qty: 30 CAPSULE | Refills: 1 | Status: SHIPPED | OUTPATIENT
Start: 2019-12-06 | End: 2020-01-22

## 2019-12-06 RX ORDER — BUPRENORPHINE AND NALOXONE 4; 1 MG/1; MG/1
1 FILM, SOLUBLE BUCCAL; SUBLINGUAL 2 TIMES DAILY
Qty: 56 EACH | Refills: 0 | Status: SHIPPED | OUTPATIENT
Start: 2019-12-06 | End: 2020-01-22

## 2019-12-06 ASSESSMENT — PATIENT HEALTH QUESTIONNAIRE - PHQ9: SUM OF ALL RESPONSES TO PHQ QUESTIONS 1-9: 9

## 2019-12-06 ASSESSMENT — PAIN SCALES - GENERAL: PAINLEVEL: NO PAIN (0)

## 2019-12-06 NOTE — NURSING NOTE
"Chief Complaint   Patient presents with     RECHECK       Initial /84 (BP Location: Left arm, Patient Position: Chair, Cuff Size: Adult Regular)   Pulse 108   Temp 99  F (37.2  C) (Tympanic)   Resp 20   Wt 59.2 kg (130 lb 9.6 oz)   SpO2 99%   BMI 23.13 kg/m   Estimated body mass index is 23.13 kg/m  as calculated from the following:    Height as of 7/25/19: 1.6 m (5' 3\").    Weight as of this encounter: 59.2 kg (130 lb 9.6 oz).  Medication Reconciliation: complete  Yuki Pina LPN    "

## 2019-12-10 ENCOUNTER — TELEPHONE (OUTPATIENT)
Dept: BEHAVIORAL HEALTH | Facility: OTHER | Age: 39
End: 2019-12-10

## 2019-12-10 NOTE — TELEPHONE ENCOUNTER
Received referral for possible [facility] services from Dr. Stack.  Attempted to reach patient, but was unsuccessful.  Plan to attempt again.       Zoë Guo [facility]  Social Work Care Coordinator  [facility]   [phone] or [phone]

## 2020-01-06 NOTE — PROGRESS NOTES
"Follow-up Buprenorphine Visit           HPI       Brissa Santana is here for f/u on buprenophine/naloxone (Suboxone) therapy for opioid use disorder.  Suboxone Follow up    Brissa is currently taking 4/1 mg of Buprenorphine/Naloxone 2 times daily.     Had to reschedule visit twice.  Admits it is \"her own problem\" regarding how to get her.      Ongoing meth use a few times.  7 days of use since last seen.  Went looking for heroin - cravings when out of Buprenorphine.  Didn't use - they didn't give her.  Couldn't sleep last night.  Got 2 klonopin to take last night.    Off Effexor XR x 2 days - lost bottle.  Is on 75 mg.    Interested in trying Remeron.    When on Suboxone - no cravings for opiates.  Does get tired.  Did vit D.  Still tired.  Triggers her to use methamphetamines.    Status since last visit:    Since last visit patient has been: struggling.     Intensity:     There has been: no craving.      Suboxone Dose: adequate.    Progression of Symptoms:     Cues to use and relapse - present - neighbor    Recovery program has been: ignored.   Accompanying Signs & Symptoms:    Side Effects: fatigue.    Sobriety:     Status: patient has relapsed.      Drug Screen: obtained.    Precipitating factors:    Triggers have been: intense.   Alleviating factors:    Contact with sponsor has been: no sponsor.     Family and support system has been: neutral.   Other Therapies Tried :     Patient has been going to recovery meetings:not at all.      Other medical concerns: No    Any ED visits? No       History   Smoking Status     Former Smoker     Packs/day: 1.00     Years: 15.00     Types: Cigarettes     Quit date: 3/11/2017   Smokeless Tobacco     Never Used     Comment: Tried to quit (yes); longest period tobacco-free- 9 months       Problem, Medication and Allergy Lists were reviewed and updated if needed.  reviewed and are current..    Pharmacy Database reviewed? Yes, 1.22.20, as expected.  Last fill 12.3.19 #56 - 14 " "day supply.    Patient is an established patient of this clinic..         Review of Systems:   Review of Systems  CONSTITUTIONAL: NEGATIVE for fever, chills, change in weight  INTEGUMENTARY/SKIN: NEGATIVE for worrisome rashes, moles or lesions  EYES: NEGATIVE for vision changes or irritation  ENT/MOUTH: NEGATIVE for ear, mouth and throat problems  RESP: NEGATIVE for significant cough or SOB  CV: NEGATIVE for chest pain, palpitations or peripheral edema  GI: NEGATIVE for nausea, abdominal pain, heartburn, or change in bowel habits  : NEGATIVE for frequency, dysuria, or hematuria  MUSCULOSKELETAL: NEGATIVE for significant arthralgias or myalgia  NEURO: NEGATIVE for weakness, dizziness or paresthesias  ENDOCRINE: NEGATIVE for temperature intolerance, skin/hair changes  PSYCHIATRIC: NEGATIVE for changes in mood or affect          Physical Exam:     Vitals:    01/22/20 0829   BP: 120/82   BP Location: Left arm   Patient Position: Sitting   Cuff Size: Adult Regular   Pulse: 95   Temp: 97.2  F (36.2  C)   TempSrc: Tympanic   SpO2: 98%   Weight: 59.9 kg (132 lb)   Height: 1.6 m (5' 3\")     Body mass index is 23.38 kg/m .  Vitals were reviewed and were normal  Vitals were reviewed   Physical Exam  GENERAL APPEARANCE: alert, comfortable appearing  EYES: Eyes grossly normal to inspection  HENT:  nose no rhinorrhea  RESP: lungs clear to auscultation - no rales, rhonchi or wheezes and no resp distress  CV: regular rates and rhythm, normal S1 S2,no murmur   ABDOMEN:soft, non-tender, non-distended, no hepatosplenomegaly or masses  SKIN: no rashes, no jaundice, no obvious masses.   NEURO:  no tremor  MENTAL STATUS EXAM:  Appearance/Behavior:No apparent distress  Speech: Normal  Mood/Affect: depressed affect  Insight: Fair      Results:    Results from the last 24 hours  Results for orders placed or performed in visit on 01/22/20 (from the past 24 hour(s))   Urine Drugs of Abuse Screen (Tox13)   Result Value Ref Range    " Cannabinoids (58-ehi-8-carboxy-9-THC) Not Detected NDET^Not Detected ng/mL    Phencyclidine (Phencyclidine) Not Detected NDET^Not Detected ng/mL    Cocaine (Benzoylecgonine) Not Detected NDET^Not Detected ng/mL    Methamphetamine (d-Methamphetamine) Not Detected NDET^Not Detected ng/mL    Opiates (Morphine) Not Detected NDET^Not Detected ng/mL    Amphetamine (d-Amphetamine) Not Detected NDET^Not Detected ng/mL    Benzodiazepines (Nordiazepam) Not Detected NDET^Not Detected ng/mL    Tricyclic Antidepressants (Desipramine) Not Detected NDET^Not Detected ng/mL    Methadone (Methadone) Not Detected NDET^Not Detected ng/mL    Barbiturates (Butalbital) Not Detected NDET^Not Detected ng/mL    Oxycodone (Oxycodone) Not Detected NDET^Not Detected ng/mL    Propoxyphene (Norpropoxyphene) Not Detected NDET^Not Detected ng/mL    Buprenorphine (Buprenorphine) Not Detected NDET^Not Detected ng/mL       Assessment and Plan     Was naloxone nasal spray prescribed? Yes.  Prior.  1. Opioid use disorder (H)  Doing well when has Suboxone.  Issues following up.  No excuses - given number for rides through her insurance  - Urine Drugs of Abuse Screen (Tox13)  - buprenorphine HCl-naloxone HCl (SUBOXONE) 4-1 MG per film; Place 1 Film under the tongue 2 times daily  Dispense: 16 each; Refill: 0    2. Methamphetamine use (H)  Escalated recently.  Willing to try Remeron to help with mood and sleep.    Counseling to be set up.    3. MDD (major depressive disorder), recurrent, severe, with psychosis (H)  Wants to be off Effexor XR.  Will cut back.  Add Remeron.    4. FRANCIA (generalized anxiety disorder)  Substance induced is part of it.    Patient Instructions   Your insurance plan covers medical transportation.  You can call:  [phone] to set up medical transportation.    Reduce the Effexor XR to 37.5 mg.  Start Remeron 15 mg at bedtime.  Continue Suboxone 4 mg twice daily.  Can try taking all 8 mg at night.      Will see if Behavioral health  home staff can meet with you at next visit as well to set up additional therapy appointments.            Dosage will not need adjustment today.  Continue at 4 mg  2 time(s) a day of  buprenorphine/naloxone (Suboxone).     Follow up Plan  Stage 1(1 week): Please make a clinic appointment for follow up with me (TABITHA STATON) or another Suboxone provider in 1 week for suboxone follow up.    Greater than 25 minutes was spent with this patient, over half of which was on counseling and coordination of care which includes importance of recovery activities,which may include  attendance at NA/AA meetings, sober support network, and the importance of not isolating, being open, honest, and willing to change, possible triggers and how to avoid them, and managing cravings.    There are no discontinued medications.    Options for treatment and follow-up care were reviewed with the patient. Brissa engaged in the decision making process and verbalized understanding of the options discussed and agreed with the final plan.    Tabitha Staton MD

## 2020-01-22 ENCOUNTER — OFFICE VISIT (OUTPATIENT)
Dept: FAMILY MEDICINE | Facility: OTHER | Age: 40
End: 2020-01-22
Attending: FAMILY MEDICINE
Payer: COMMERCIAL

## 2020-01-22 ENCOUNTER — PATIENT OUTREACH (OUTPATIENT)
Dept: CARE COORDINATION | Facility: OTHER | Age: 40
End: 2020-01-22

## 2020-01-22 ENCOUNTER — APPOINTMENT (OUTPATIENT)
Dept: LAB | Facility: OTHER | Age: 40
End: 2020-01-22
Attending: FAMILY MEDICINE
Payer: COMMERCIAL

## 2020-01-22 VITALS
HEIGHT: 63 IN | DIASTOLIC BLOOD PRESSURE: 82 MMHG | OXYGEN SATURATION: 98 % | TEMPERATURE: 97.2 F | HEART RATE: 95 BPM | BODY MASS INDEX: 23.39 KG/M2 | WEIGHT: 132 LBS | SYSTOLIC BLOOD PRESSURE: 120 MMHG

## 2020-01-22 DIAGNOSIS — F33.3 MDD (MAJOR DEPRESSIVE DISORDER), RECURRENT, SEVERE, WITH PSYCHOSIS (H): ICD-10-CM

## 2020-01-22 DIAGNOSIS — F41.1 GAD (GENERALIZED ANXIETY DISORDER): ICD-10-CM

## 2020-01-22 DIAGNOSIS — F15.10 METHAMPHETAMINE USE (H): ICD-10-CM

## 2020-01-22 DIAGNOSIS — F11.90 OPIOID USE DISORDER: Primary | ICD-10-CM

## 2020-01-22 PROCEDURE — 80306 DRUG TEST PRSMV INSTRMNT: CPT | Mod: ZL | Performed by: FAMILY MEDICINE

## 2020-01-22 PROCEDURE — G0463 HOSPITAL OUTPT CLINIC VISIT: HCPCS

## 2020-01-22 PROCEDURE — 99214 OFFICE O/P EST MOD 30 MIN: CPT | Performed by: FAMILY MEDICINE

## 2020-01-22 RX ORDER — VENLAFAXINE HYDROCHLORIDE 37.5 MG/1
CAPSULE, EXTENDED RELEASE ORAL
Qty: 30 CAPSULE | Refills: 1 | Status: SHIPPED | OUTPATIENT
Start: 2020-01-22 | End: 2021-04-07

## 2020-01-22 RX ORDER — MIRTAZAPINE 15 MG/1
15 TABLET, ORALLY DISINTEGRATING ORAL AT BEDTIME
Qty: 30 TABLET | Refills: 1 | Status: SHIPPED | OUTPATIENT
Start: 2020-01-22 | End: 2020-01-29

## 2020-01-22 RX ORDER — BUPRENORPHINE AND NALOXONE 4; 1 MG/1; MG/1
1 FILM, SOLUBLE BUCCAL; SUBLINGUAL 2 TIMES DAILY
Qty: 16 EACH | Refills: 0 | Status: SHIPPED | OUTPATIENT
Start: 2020-01-22 | End: 2020-01-29

## 2020-01-22 ASSESSMENT — ACTIVITIES OF DAILY LIVING (ADL): DEPENDENT_IADLS:: INDEPENDENT

## 2020-01-22 ASSESSMENT — PATIENT HEALTH QUESTIONNAIRE - PHQ9: SUM OF ALL RESPONSES TO PHQ QUESTIONS 1-9: 13

## 2020-01-22 ASSESSMENT — ANXIETY QUESTIONNAIRES
1. FEELING NERVOUS, ANXIOUS, OR ON EDGE: MORE THAN HALF THE DAYS
7. FEELING AFRAID AS IF SOMETHING AWFUL MIGHT HAPPEN: MORE THAN HALF THE DAYS
IF YOU CHECKED OFF ANY PROBLEMS ON THIS QUESTIONNAIRE, HOW DIFFICULT HAVE THESE PROBLEMS MADE IT FOR YOU TO DO YOUR WORK, TAKE CARE OF THINGS AT HOME, OR GET ALONG WITH OTHER PEOPLE: VERY DIFFICULT
5. BEING SO RESTLESS THAT IT IS HARD TO SIT STILL: SEVERAL DAYS
3. WORRYING TOO MUCH ABOUT DIFFERENT THINGS: MORE THAN HALF THE DAYS
4. TROUBLE RELAXING: NEARLY EVERY DAY
GAD7 TOTAL SCORE: 15
2. NOT BEING ABLE TO STOP OR CONTROL WORRYING: NEARLY EVERY DAY
6. BECOMING EASILY ANNOYED OR IRRITABLE: MORE THAN HALF THE DAYS

## 2020-01-22 ASSESSMENT — PAIN SCALES - GENERAL: PAINLEVEL: NO PAIN (0)

## 2020-01-22 ASSESSMENT — MIFFLIN-ST. JEOR: SCORE: 1237.88

## 2020-01-22 NOTE — PATIENT INSTRUCTIONS
Your insurance plan covers medical transportation.  You can call:  [phone] to set up medical transportation.    Reduce the Effexor XR to 37.5 mg.  Start Remeron 15 mg at bedtime.  Continue Suboxone 4 mg twice daily.  Can try taking all 8 mg at night.      Will see if Behavioral health home staff can meet with you at next visit as well to set up additional therapy appointments.

## 2020-01-22 NOTE — PROGRESS NOTES
Suboxone RX did not send to pharmacy.  CC called RX into [facility] - pharmacist Carl Robert, RN-BSN  Clinic Care Coordinator  [phone] opt. #3

## 2020-01-22 NOTE — PROGRESS NOTES
Clinic Care Coordination Contact  Care Team Conversations    CC attended office visit with pt and Dr. Stack this date.  Please refer to Dr. Stack's note for plan of care.  Pt struggling this date.  Meth use, Klonopin use.  Did seek out heroin  - deal fell through though.  She is remorseful and really wants help.  Missing appointments is an issue for her.    Provided her with her insurance's transportation phone number as transportation is covered.  In need of counseling, which she is agreeable too.  Will see if BETSEY Duran, can meet with pt next week at scheduled appt.  All questions answered.  Encouraged her to call/text CC with any questions/concerns/problems.  Verbalized understanding.    Vera Robert RN-BSN  Clinic Care Coordinator  [phone] opt. #3

## 2020-01-23 ASSESSMENT — ANXIETY QUESTIONNAIRES: GAD7 TOTAL SCORE: 15

## 2020-01-27 NOTE — PROGRESS NOTES
Follow-up Buprenorphine Visit           HPI       Brissa Santana is here for f/u on buprenophine/naloxone (Suboxone) therapy for opioid use disorder.  Suboxone Follow Up    Brissa is currently taking 4/1 mg of Buprenorphine/Naloxone 2 times daily.     Status since last visit:    Since last visit patient has been: doing well.     Intensity:     There has been: no craving.      Suboxone Dose: adequate.    Progression of Symptoms:     Cues to use and relapse None    Recovery program has been: active.   Accompanying Signs & Symptoms:    Side Effects: none.    Sobriety:     Status: no use since last visit.      Drug Screen: obtained.    Precipitating factors:    Triggers have been: mild. Quit talking to her neighbor  Alleviating factors:    Contact with sponsor has been: no sponsor.     Family and support system has been: helpful.   Other Therapies Tried :     Patient has been going to recovery meetings:not at all.      Started Remeron last week.  Sleep has been good.  Still fatigued.  Excessive eating.  Has gained weight in the last week.  18# per clinic scale.    Other medical concerns: No    Any ED visits? No       History   Smoking Status     Former Smoker     Packs/day: 1.00     Years: 15.00     Types: Cigarettes     Quit date: 3/11/2017   Smokeless Tobacco     Never Used     Comment: Tried to quit (yes); longest period tobacco-free- 9 months       Problem, Medication and Allergy Lists were reviewed and updated if needed.  reviewed and are current..    Pharmacy Database reviewed? Yes, 1.29.20, as expected.    Patient is an established patient of this clinic..         Review of Systems:   Review of Systems  CONSTITUTIONAL: NEGATIVE for fever, chills, change in weight  INTEGUMENTARY/SKIN: NEGATIVE for worrisome rashes, moles or lesions  EYES: NEGATIVE for vision changes or irritation  ENT/MOUTH: NEGATIVE for ear, mouth and throat problems  RESP: NEGATIVE for significant cough or SOB  CV: NEGATIVE for chest pain,  palpitations or peripheral edema  GI: NEGATIVE for nausea, abdominal pain, heartburn, or change in bowel habits  : NEGATIVE for frequency, dysuria, or hematuria  MUSCULOSKELETAL: NEGATIVE for significant arthralgias or myalgia  NEURO: NEGATIVE for weakness, dizziness or paresthesias  ENDOCRINE: NEGATIVE for temperature intolerance, skin/hair changes  PSYCHIATRIC: NEGATIVE for changes in mood or affect          Physical Exam:     Vitals:    01/29/20 0951   BP: 138/86   BP Location: Right arm   Patient Position: Chair   Cuff Size: Adult Large   Pulse: 111   Temp: 98.6  F (37  C)   TempSrc: Tympanic   SpO2: 98%   Weight: 68.3 kg (150 lb 9.6 oz)     Body mass index is 26.68 kg/m .  Vitals were reviewed   Physical Exam  GENERAL APPEARANCE: alert, comfortable appearing  EYES: Eyes grossly normal to inspection  HENT:  nose no rhinorrhea  RESP: lungs clear to auscultation - no rales, rhonchi or wheezes and no resp distress  CV: regular rates and rhythm, normal S1 S2,no murmur   ABDOMEN:soft, non-tender, non-distended, no hepatosplenomegaly or masses  SKIN: no rashes, no jaundice, no obvious masses.   NEURO:  no tremor  MENTAL STATUS EXAM:  Appearance/Behavior:No apparent distress  Speech: Normal  Mood/Affect: normal affect  Insight: Fair      Results:      Results from the last 24 hours  Results for orders placed or performed in visit on 01/29/20 (from the past 24 hour(s))   Urine Drugs of Abuse Screen (Tox13)   Result Value Ref Range    Cannabinoids (85-ysf-0-carboxy-9-THC) Not Detected NDET^Not Detected ng/mL    Phencyclidine (Phencyclidine) Not Detected NDET^Not Detected ng/mL    Cocaine (Benzoylecgonine) Not Detected NDET^Not Detected ng/mL    Methamphetamine (d-Methamphetamine) Not Detected NDET^Not Detected ng/mL    Opiates (Morphine) Not Detected NDET^Not Detected ng/mL    Amphetamine (d-Amphetamine) Not Detected NDET^Not Detected ng/mL    Benzodiazepines (Nordiazepam) Not Detected NDET^Not Detected ng/mL     Tricyclic Antidepressants (Desipramine) Not Detected NDET^Not Detected ng/mL    Methadone (Methadone) Not Detected NDET^Not Detected ng/mL    Barbiturates (Butalbital) Not Detected NDET^Not Detected ng/mL    Oxycodone (Oxycodone) Not Detected NDET^Not Detected ng/mL    Propoxyphene (Norpropoxyphene) Not Detected NDET^Not Detected ng/mL    Buprenorphine (Buprenorphine) Detected, Abnormal Result (A) NDET^Not Detected ng/mL       Assessment and Plan     Was naloxone nasal spray prescribed? No Details: has some at home.  1. Opioid use disorder (H)  Doing well.  No relapse.  Remeron for mood and methamphetamine - is helping.  However - has excessive increase in appetite and is eating anything and everything - bowls of brown sugars, multiple coca cola's in a day, malts, etc.    Will try reducing dose in 1/2.  Reign in dietary cravings - making healthier choices.  Reassess at next visit.  Plans to meet with Zoë DAWKINS as well.    - Urine Drugs of Abuse Screen (Tox13)    Dosage will not need adjustment today.  Continue at 4/1 mg  2 time(s) a day of  buprenorphine/naloxone (Suboxone).     Follow up Plan  Stage 2 (2 weeks): Please make a clinic appointment for follow up with me (SANDEEP STATON) or another Suboxone provider in 2 weeks for suboxone follow up.    (F15.10) Methamphetamine use (H)  (R63.5) Weight gain    Greater than 15 minutes was spent with this patient, over half of which was on counseling and coordination of care which includes importance of recovery activities,which may include  attendance at NA/AA meetings, sober support network, and the importance of not isolating, being open, honest, and willing to change, possible triggers and how to avoid them, and managing cravings.    There are no discontinued medications.    Options for treatment and follow-up care were reviewed with the patient. Brissa engaged in the decision making process and verbalized understanding of the options discussed and agreed with the  final plan.    Tabitha Sommers MD

## 2020-01-29 ENCOUNTER — OFFICE VISIT (OUTPATIENT)
Dept: BEHAVIORAL HEALTH | Facility: OTHER | Age: 40
End: 2020-01-29
Attending: FAMILY MEDICINE
Payer: COMMERCIAL

## 2020-01-29 ENCOUNTER — PATIENT OUTREACH (OUTPATIENT)
Dept: CARE COORDINATION | Facility: OTHER | Age: 40
End: 2020-01-29

## 2020-01-29 ENCOUNTER — OFFICE VISIT (OUTPATIENT)
Dept: FAMILY MEDICINE | Facility: OTHER | Age: 40
End: 2020-01-29
Attending: FAMILY MEDICINE
Payer: COMMERCIAL

## 2020-01-29 VITALS
TEMPERATURE: 98.6 F | BODY MASS INDEX: 26.68 KG/M2 | HEART RATE: 111 BPM | OXYGEN SATURATION: 98 % | DIASTOLIC BLOOD PRESSURE: 86 MMHG | SYSTOLIC BLOOD PRESSURE: 138 MMHG | WEIGHT: 150.6 LBS

## 2020-01-29 DIAGNOSIS — F15.10 METHAMPHETAMINE USE (H): ICD-10-CM

## 2020-01-29 DIAGNOSIS — F11.90 OPIOID USE DISORDER: Primary | ICD-10-CM

## 2020-01-29 DIAGNOSIS — R63.5 WEIGHT GAIN: ICD-10-CM

## 2020-01-29 DIAGNOSIS — R69 DIAGNOSIS DEFERRED: Primary | ICD-10-CM

## 2020-01-29 PROCEDURE — 80306 DRUG TEST PRSMV INSTRMNT: CPT | Mod: ZL | Performed by: FAMILY MEDICINE

## 2020-01-29 PROCEDURE — G0463 HOSPITAL OUTPT CLINIC VISIT: HCPCS

## 2020-01-29 PROCEDURE — 99214 OFFICE O/P EST MOD 30 MIN: CPT | Performed by: FAMILY MEDICINE

## 2020-01-29 RX ORDER — BUPRENORPHINE AND NALOXONE 4; 1 MG/1; MG/1
1 FILM, SOLUBLE BUCCAL; SUBLINGUAL 2 TIMES DAILY
Qty: 28 EACH | Refills: 0 | Status: SHIPPED | OUTPATIENT
Start: 2020-01-29 | End: 2020-02-19

## 2020-01-29 RX ORDER — MIRTAZAPINE 15 MG/1
7.5 TABLET, FILM COATED ORAL AT BEDTIME
COMMUNITY
Start: 2020-01-29 | End: 2021-04-07

## 2020-01-29 ASSESSMENT — ACTIVITIES OF DAILY LIVING (ADL): DEPENDENT_IADLS:: INDEPENDENT

## 2020-01-29 ASSESSMENT — PAIN SCALES - GENERAL: PAINLEVEL: NO PAIN (0)

## 2020-01-29 NOTE — PROGRESS NOTES
Clinic Care Coordination Contact  Care Team Conversations    CC attended office visit with pt and Dr. Stack this date.  Please refer to Dr. Stack's note for plan of care.  Doing well this date.  All questions answered.  Encouraged her to call/text CC with any questions/concerns/problems.  Verbalized understanding.    Vera Robert RN-BSN  Clinic Care Coordinator  [phone] opt. #3

## 2020-01-29 NOTE — PATIENT INSTRUCTIONS
Continue Suboxone current dose.  Follow up 2 weeks.  Trial of reducing Remeron to 1/2 tab or 7.5 mg.  Dietary changes.  Monitor weight.

## 2020-01-29 NOTE — PROGRESS NOTES
CH[facility] CC did not see patient today per Vera Robert patient had errands to run after appointment. Will plan to see patient at next scheduled appointment.     BETSEY Duran  Social Work Care Coordinator  [facility]   Ext [phone] or [phone]

## 2020-02-12 ENCOUNTER — TELEPHONE (OUTPATIENT)
Dept: FAMILY MEDICINE | Facility: OTHER | Age: 40
End: 2020-02-12

## 2020-02-12 NOTE — TELEPHONE ENCOUNTER
Patient is not going to make her appointment for her suboxone today she is wondering if she can push the appointment to later today?  Can you please advise     [phone]- okay to leave a voice mail.

## 2020-02-12 NOTE — TELEPHONE ENCOUNTER
LM on VM stating to come at 3:00PM and if that does not work, to call/text to reschedule for tomorrow.    Vera Robert, RN-BSN  Clinic Care Coordinator  [phone] opt. #3

## 2020-02-19 ENCOUNTER — OFFICE VISIT (OUTPATIENT)
Dept: FAMILY MEDICINE | Facility: OTHER | Age: 40
End: 2020-02-19
Attending: FAMILY MEDICINE
Payer: COMMERCIAL

## 2020-02-19 ENCOUNTER — PATIENT OUTREACH (OUTPATIENT)
Dept: CARE COORDINATION | Facility: OTHER | Age: 40
End: 2020-02-19

## 2020-02-19 ENCOUNTER — OFFICE VISIT (OUTPATIENT)
Dept: BEHAVIORAL HEALTH | Facility: OTHER | Age: 40
End: 2020-02-19
Attending: SOCIAL WORKER
Payer: COMMERCIAL

## 2020-02-19 ENCOUNTER — APPOINTMENT (OUTPATIENT)
Dept: LAB | Facility: OTHER | Age: 40
End: 2020-02-19
Attending: FAMILY MEDICINE
Payer: COMMERCIAL

## 2020-02-19 VITALS
OXYGEN SATURATION: 98 % | HEIGHT: 63 IN | HEART RATE: 107 BPM | DIASTOLIC BLOOD PRESSURE: 82 MMHG | BODY MASS INDEX: 24.1 KG/M2 | SYSTOLIC BLOOD PRESSURE: 130 MMHG | WEIGHT: 136 LBS | TEMPERATURE: 98.1 F

## 2020-02-19 DIAGNOSIS — F15.10 METHAMPHETAMINE ABUSE (H): ICD-10-CM

## 2020-02-19 DIAGNOSIS — F11.90 OPIOID USE DISORDER: Primary | ICD-10-CM

## 2020-02-19 DIAGNOSIS — R69 DIAGNOSIS DEFERRED: Primary | ICD-10-CM

## 2020-02-19 PROCEDURE — 80306 DRUG TEST PRSMV INSTRMNT: CPT | Mod: ZL | Performed by: FAMILY MEDICINE

## 2020-02-19 PROCEDURE — G0463 HOSPITAL OUTPT CLINIC VISIT: HCPCS

## 2020-02-19 PROCEDURE — 99000 SPECIMEN HANDLING OFFICE-LAB: CPT | Performed by: FAMILY MEDICINE

## 2020-02-19 PROCEDURE — 99214 OFFICE O/P EST MOD 30 MIN: CPT | Performed by: FAMILY MEDICINE

## 2020-02-19 PROCEDURE — 80307 DRUG TEST PRSMV CHEM ANLYZR: CPT | Mod: ZL | Performed by: FAMILY MEDICINE

## 2020-02-19 RX ORDER — BUPRENORPHINE AND NALOXONE 4; 1 MG/1; MG/1
1 FILM, SOLUBLE BUCCAL; SUBLINGUAL 2 TIMES DAILY
Qty: 28 EACH | Refills: 0 | Status: SHIPPED | OUTPATIENT
Start: 2020-02-19 | End: 2020-11-12

## 2020-02-19 ASSESSMENT — ACTIVITIES OF DAILY LIVING (ADL): DEPENDENT_IADLS:: INDEPENDENT

## 2020-02-19 ASSESSMENT — PAIN SCALES - GENERAL: PAINLEVEL: NO PAIN (0)

## 2020-02-19 ASSESSMENT — MIFFLIN-ST. JEOR: SCORE: 1256.02

## 2020-02-19 NOTE — PROGRESS NOTES
Clinic Care Coordination Contact  Care Team Conversations    CC attended office visit with pt and Dr. Stack this date.  Please refer to Dr. Stack's note for plan of care.  Discussed possibility of outpatient treatment.  She is going to consider this. Is remorseful about the meth use.  She needs structure in her life.  Going to see if [facility] can meet with her this date.  All questions answered.  Encouraged her to call/text CC with any questions/concerns/problems.  Verbalized understanding.    Vera Robert RN-BSN  Clinic Care Coordinator  [phone] opt. #3

## 2020-02-19 NOTE — NURSING NOTE
"Chief Complaint   Patient presents with     Recheck Medication       Initial /82 (BP Location: Right arm, Patient Position: Sitting, Cuff Size: Adult Regular)   Pulse 107   Temp 98.1  F (36.7  C) (Tympanic)   Ht 1.6 m (5' 3\")   Wt 61.7 kg (136 lb)   SpO2 98%   BMI 24.09 kg/m   Estimated body mass index is 24.09 kg/m  as calculated from the following:    Height as of this encounter: 1.6 m (5' 3\").    Weight as of this encounter: 61.7 kg (136 lb).  Medication Reconciliation: complete  Mónica Garcia LPN  "

## 2020-02-19 NOTE — PROGRESS NOTES
"CHOCO MEEK introduced and explained integrated health model, brief therapy interventions and referral/ support services for ongoing therapy interventions.  Discussed [facility] services, patient was interested.    Presenting issue:  Pt is seeing Dr Stack and in the saboxone program.  She has a long history of opioid use disorder as well as a recent increase in meth abuse.  Brissa has been to treatment many many times and continues to relapse.  She had lost a close friend in 2017 to a heroine overdose and had overdosed herself after that time.  She has 4 children ages 19, 18, 14 and 16 years old who she states are \"wonderful, healthy happy kids\"  She does not use in front of her children and they are aware of her addiction struggles. She denies being suicidal but does have an overall feeling of wanting to die.  She would not act on this.  She doesn't drive as she has had 3-DUI's on the past.     She is scheduled to enroll on Monday Feb 24th at 2pm.  Ride is set up with [employer] and paid by insurance  [phone]    February 19, 2020 BETSEY Wadsworth   "

## 2020-02-19 NOTE — PROGRESS NOTES
Follow-up Buprenorphine Visit           HPI       Brissa Santana is here for f/u on buprenophine/naloxone (Suboxone) therapy for opioid use disorder.  Suboxone Follow Up    Brissa is currently taking 4/1 mg of Buprenorphine/Naloxone 2 times daily.     Status since last visit:    Since last visit patient has been: struggling. Lack of motivation.  Admits to being irresponsible.  Stopped the Remeron due to weight gain.  Unsure if the gain was caused by Remeron or the meth washout.    Intensity:     There has been: intense craving.  No plan on acting on it.      Suboxone Dose: adequate.  When she takes it as prescribed.  Has been out for a week.  Progression of Symptoms:     Cues to use and relapse Admits to meth use 4 times since last visit.      Recovery program has been: weak.   Accompanying Signs & Symptoms:    Side Effects: none.    Sobriety:     Status: patient has relapsed.  Meth - smoked it - yesterday.      Drug Screen: obtained.    Precipitating factors:    Triggers have been: moderate. Neighbor is still a trigger.  Alleviating factors:    Contact with sponsor has been: no sponsor.     Family and support system has been: helpful.   Other Therapies Tried :     Patient has been going to recovery meetings:not at all.      Other medical concerns: No    Any ED visits? No       History   Smoking Status     Former Smoker     Packs/day: 1.00     Years: 15.00     Types: Cigarettes     Quit date: 3/11/2017   Smokeless Tobacco     Never Used     Comment: Tried to quit (yes); longest period tobacco-free- 9 months       Problem, Medication and Allergy Lists were reviewed and updated if needed.  reviewed and are current..    Pharmacy Database reviewed? Yes, 2.19.20, as expected.  Last fill os Suboxone 1.29.20 #16 - 8 day supply.  Has been out for over 1 week.    Patient is an established patient of this clinic..         Review of Systems:   Review of Systems  CONSTITUTIONAL: NEGATIVE for fever, chills, change in  "weight  INTEGUMENTARY/SKIN: NEGATIVE for worrisome rashes, moles or lesions  EYES: NEGATIVE for vision changes or irritation  ENT/MOUTH: NEGATIVE for ear, mouth and throat problems  RESP: NEGATIVE for significant cough or SOB  CV: NEGATIVE for chest pain, palpitations or peripheral edema  GI: NEGATIVE for nausea, abdominal pain, heartburn, or change in bowel habits  : NEGATIVE for frequency, dysuria, or hematuria  MUSCULOSKELETAL: NEGATIVE for significant arthralgias or myalgia  NEURO: NEGATIVE for weakness, dizziness or paresthesias  ENDOCRINE: NEGATIVE for temperature intolerance, skin/hair changes  PSYCHIATRIC: NEGATIVE for changes in mood or affect          Physical Exam:     Vitals:    02/19/20 1101   BP: 130/82   BP Location: Right arm   Patient Position: Sitting   Cuff Size: Adult Regular   Pulse: 107   Temp: 98.1  F (36.7  C)   TempSrc: Tympanic   SpO2: 98%   Weight: 61.7 kg (136 lb)   Height: 1.6 m (5' 3\")     Body mass index is 24.09 kg/m .  Vitals were reviewed   Physical Exam  GENERAL APPEARANCE: alert, comfortable appearing  EYES: Eyes grossly normal to inspection  HENT:  nose no rhinorrhea  RESP: lungs clear to auscultation - no rales, rhonchi or wheezes and no resp distress  CV: regular rates and rhythm, normal S1 S2,no murmur   SKIN: no rashes, no jaundice, no obvious masses.   NEURO:  no tremor  MENTAL STATUS EXAM:  Appearance/Behavior:No apparent distress  Speech: Normal  Mood/Affect: normal affect  Insight: Poor      Results:    Results from the last 24 hours  Results for orders placed or performed in visit on 02/19/20 (from the past 24 hour(s))   Urine Drugs of Abuse Screen (Tox13)   Result Value Ref Range    Cannabinoids (95-oyp-5-carboxy-9-THC) Not Detected NDET^Not Detected ng/mL    Phencyclidine (Phencyclidine) Not Detected NDET^Not Detected ng/mL    Cocaine (Benzoylecgonine) Not Detected NDET^Not Detected ng/mL    Methamphetamine (d-Methamphetamine) Not Detected NDET^Not Detected ng/mL    " Opiates (Morphine) Not Detected NDET^Not Detected ng/mL    Amphetamine (d-Amphetamine) Not Detected NDET^Not Detected ng/mL    Benzodiazepines (Nordiazepam) Not Detected NDET^Not Detected ng/mL    Tricyclic Antidepressants (Desipramine) Not Detected NDET^Not Detected ng/mL    Methadone (Methadone) Not Detected NDET^Not Detected ng/mL    Barbiturates (Butalbital) Not Detected NDET^Not Detected ng/mL    Oxycodone (Oxycodone) Not Detected NDET^Not Detected ng/mL    Propoxyphene (Norpropoxyphene) Not Detected NDET^Not Detected ng/mL    Buprenorphine (Buprenorphine) Not Detected NDET^Not Detected ng/mL       Assessment and Plan     Was naloxone nasal spray prescribed? No Details: has some at home.  1. Opioid use disorder (H)  No relapse with opioids, but meth as noted above.  - Urine Drugs of Abuse Screen (Tox13)    Dosage will not need adjustment today.    Continue at 4/1 mg  2 time(s) a day of  buprenorphine/naloxone (Suboxone).     (F15.10) Methamphetamine abuse (H)  Comment: increase use; remeron was helping, but weight gain was significant; willing to retry at 1/2 dose  Plan: restart remeron 7.5 mg this time    Meeting with social workEli, today regarding treatment options.      Follow up Plan  Stage 2 (2 weeks): Please make a clinic appointment for follow up with me (SANDEEP STATON) or another Suboxone provider in 2 weeks for suboxone follow up.    Greater than 15 minutes was spent with this patient, over half of which was on counseling and coordination of care which includes importance of recovery activities,which may include  attendance at NA/AA meetings, sober support network, and the importance of not isolating, being open, honest, and willing to change, possible triggers and how to avoid them, and managing cravings.    Medications Discontinued During This Encounter   Medication Reason     buprenorphine HCl-naloxone HCl (SUBOXONE) 4-1 MG per film Reorder       Options for treatment and follow-up care  were reviewed with the patient. Brissa engaged in the decision making process and verbalized understanding of the options discussed and agreed with the final plan.    Tabitha Sommers MD

## 2020-02-26 LAB — PAIN DRUG SCR UR W RPTD MEDS: NORMAL

## 2020-07-31 ENCOUNTER — TELEPHONE (OUTPATIENT)
Dept: OBGYN | Facility: OTHER | Age: 40
End: 2020-07-31

## 2020-07-31 DIAGNOSIS — N76.0 BV (BACTERIAL VAGINOSIS): Primary | ICD-10-CM

## 2020-07-31 DIAGNOSIS — B96.89 BV (BACTERIAL VAGINOSIS): Primary | ICD-10-CM

## 2020-07-31 RX ORDER — METRONIDAZOLE 500 MG/1
500 TABLET ORAL 2 TIMES DAILY
Qty: 14 TABLET | Refills: 1 | Status: SHIPPED | OUTPATIENT
Start: 2020-07-31 | End: 2020-08-07

## 2020-07-31 NOTE — TELEPHONE ENCOUNTER
Pt calls with request for a Rx for BV states she has itching and knows she has BV. Was seen on 7/25/20 with lichen sclerosis. Was supposed to follow up and no showed. Wants it sent to [facility]. Please advise

## 2020-11-10 NOTE — PROGRESS NOTES
Follow-up Buprenorphine Visit    The author of this note documented a reason for not sharing it with the patient.       HPI       Brissa Santana is here for f/u on buprenophine/naloxone (Suboxone) therapy for opioid use disorder.  Recheck Medication      Was last prescribed Suboxone 4/1mg BID - last seen February 2020    Status since last visit:    Since last visit patient has been: struggling. Hasn't been seen in 9 months.  Using 1-2 points per day - daily.   Admits to falling off the wagon.  Was hanging around the wrong people - unable to say no.     Intensity:     There has been: intense craving.      Suboxone Dose:   Hasn't had a prescription of Suboxone since February  - has taken it off the street a few times.    Progression of Symptoms:     Cues to use and relapse admits to heroin and meth - last use of heroin today - one hour ago, meth 2 days ago.  Smoking it.  No IV use.      Recovery program has been: weak.   Accompanying Signs & Symptoms:    Side Effects: none.    Sobriety:     Status: patient has relapsed.      Drug Screen: obtained.    Precipitating factors:    Triggers have been: moderate. People, places  Alleviating factors:    Contact with sponsor has been: no sponsor.     Family and support system has been: neutral.   Other Therapies Tried :     Patient has been going to recovery meetings:not at all.   Willing to enroll in [facility]    Other medical concerns: No    Any ED visits? No     Ex - and father of her children using methamphetamines now.  He has basically disappeared.    Patient uses with her neighbor most often.  However, has used at home in the basement.    Kids 11, 14, 18 at home with her, another adult child out of house as well.    History   Smoking Status     Former Smoker     Packs/day: 1.00     Years: 15.00     Types: Cigarettes     Quit date: 3/11/2017   Smokeless Tobacco     Never Used     Comment: Tried to quit (yes); longest period tobacco-free- 9 months       Problem, Medication and  Allergy Lists were reviewed and updated if needed.  reviewed and are current..    Pharmacy Database reviewed? Yes, 11.12.20, as expected.    Patient is an established patient of this clinic..         Review of Systems:   Review of Systems  CONSTITUTIONAL: NEGATIVE for fever, chills, change in weight  INTEGUMENTARY/SKIN: NEGATIVE for worrisome rashes, moles or lesions  EYES: NEGATIVE for vision changes or irritation  ENT/MOUTH: NEGATIVE for ear, mouth and throat problems  RESP: NEGATIVE for significant cough or SOB  CV: NEGATIVE for chest pain, palpitations or peripheral edema  GI: NEGATIVE for nausea, abdominal pain, heartburn, or change in bowel habits  : NEGATIVE for frequency, dysuria, or hematuria  MUSCULOSKELETAL: NEGATIVE for significant arthralgias or myalgia  NEURO: NEGATIVE for weakness, dizziness or paresthesias  ENDOCRINE: NEGATIVE for temperature intolerance, skin/hair changes  PSYCHIATRIC: NEGATIVE for changes in mood or affect          Physical Exam:     Vitals:    11/12/20 1500   BP: (!) 140/86   BP Location: Right arm   Patient Position: Chair   Cuff Size: Adult Regular   Pulse: 77   Temp: 97.8  F (36.6  C)   TempSrc: Tympanic   SpO2: 99%   Weight: 63.3 kg (139 lb 9.6 oz)     Body mass index is 24.73 kg/m .  Vitals were reviewed   Physical Exam  GENERAL APPEARANCE: alert, comfortable appearing  EYES: Eyes grossly normal to inspection  HENT:  nose no rhinorrhea  RESP: lungs clear to auscultation - no rales, rhonchi or wheezes and no resp distress  CV: regular rates and rhythm, normal S1 S2,no murmur   ABDOMEN:soft, non-tender, non-distended, no hepatosplenomegaly or masses  SKIN: no rashes, no jaundice, no obvious masses.   NEURO:  no tremor  MENTAL STATUS EXAM:  Appearance/Behavior:No apparent distress  Speech: Normal  Mood/Affect: normal affect  Insight: Limited      Results:    Results from the last 24 hours  Results for orders placed or performed in visit on 11/12/20 (from the past 24 hour(s))    Basic metabolic panel   Result Value Ref Range    Sodium 139 133 - 144 mmol/L    Potassium 3.4 3.4 - 5.3 mmol/L    Chloride 101 94 - 109 mmol/L    Carbon Dioxide 38 (H) 20 - 32 mmol/L    Anion Gap <1 (L) 3 - 14 mmol/L    Glucose 89 70 - 99 mg/dL    Urea Nitrogen 9 7 - 30 mg/dL    Creatinine 0.65 0.52 - 1.04 mg/dL    GFR Estimate >90 >60 mL/min/[1.73_m2]    GFR Estimate If Black >90 >60 mL/min/[1.73_m2]    Calcium 8.6 8.5 - 10.1 mg/dL   CBC with platelets   Result Value Ref Range    WBC 6.0 4.0 - 11.0 10e9/L    RBC Count 4.01 3.8 - 5.2 10e12/L    Hemoglobin 12.4 11.7 - 15.7 g/dL    Hematocrit 37.1 35.0 - 47.0 %    MCV 93 78 - 100 fl    MCH 30.9 26.5 - 33.0 pg    MCHC 33.4 31.5 - 36.5 g/dL    RDW 12.1 10.0 - 15.0 %    Platelet Count 359 150 - 450 10e9/L   Hepatic panel   Result Value Ref Range    Bilirubin Direct <0.1 0.0 - 0.2 mg/dL    Bilirubin Total 0.1 (L) 0.2 - 1.3 mg/dL    Albumin 3.5 3.4 - 5.0 g/dL    Protein Total 6.9 6.8 - 8.8 g/dL    Alkaline Phosphatase 105 40 - 150 U/L    ALT 16 0 - 50 U/L    AST 11 0 - 45 U/L   Urine Drugs of Abuse Screen Panel 13   Result Value Ref Range    Cannabinoids (62-qqo-5-carboxy-9-THC) Not Detected NDET^Not Detected ng/mL    Phencyclidine (Phencyclidine) Not Detected NDET^Not Detected ng/mL    Cocaine (Benzoylecgonine) Not Detected NDET^Not Detected ng/mL    Methamphetamine (d-Methamphetamine) Not Detected NDET^Not Detected ng/mL    Opiates (Morphine) Detected, Abnormal Result (A) NDET^Not Detected ng/mL    Amphetamine (d-Amphetamine) Not Detected NDET^Not Detected ng/mL    Benzodiazepines (Nordiazepam) Not Detected NDET^Not Detected ng/mL    Tricyclic Antidepressants (Desipramine) Not Detected NDET^Not Detected ng/mL    Methadone (Methadone) Not Detected NDET^Not Detected ng/mL    Barbiturates (Butalbital) Not Detected NDET^Not Detected ng/mL    Oxycodone (Oxycodone) Not Detected NDET^Not Detected ng/mL    Propoxyphene (Norpropoxyphene) Not Detected NDET^Not Detected ng/mL     Buprenorphine (Buprenorphine) Not Detected NDET^Not Detected ng/mL   HCG qualitative urine   Result Value Ref Range    HCG Qual Urine Negative NEG^Negative       Assessment and Plan     Was naloxone nasal spray prescribed? Yes.  1. Opioid use disorder (H)  Used an hour ago.  Not at acceptable level to start Suboxone today.  Instructed her to wait until at least tomorrow to start her Suboxone to avoid precipitated withdrawal. Patient understands this.  Close follow up - next week - reach out sooner if needed.  Will check with social work staff regarding support/needs for children in her home.   - Basic metabolic panel  - CBC with platelets  - Hepatic panel  - Hepatitis B Surface Antibody  - Hepatitis B surface antigen  - Hepatitis C Screen Reflex to HCV RNA Quant and Genotype  - HIV Antigen Antibody Combo  - Urine Drugs of Abuse Screen Panel 13  - HCG qualitative urine    Dosage will not need adjustment today.   Continue at 4/1 mg  2 time(s) a day of  buprenorphine/naloxone (Suboxone).     Follow up Plan  Stage 1(1 week): Please make a clinic appointment for follow up with me (SANDEEP STATON) or another Suboxone provider in 1 week for suboxone follow up.    Greater than 10 minutes was spent with this patient, over half of which was on counseling and coordination of care which includes importance of recovery activities,which may include  attendance at NA/AA meetings, sober support network, and the importance of not isolating, being open, honest, and willing to change, possible triggers and how to avoid them, and managing cravings.    Medications Discontinued During This Encounter   Medication Reason     buprenorphine HCl-naloxone HCl 4-1 MG SL per film Reorder       Options for treatment and follow-up care were reviewed with the patient. Brissa engaged in the decision making process and verbalized understanding of the options discussed and agreed with the final plan.    I saw and examined this patient.  I have  read through the note and made appropriate changes and agree with the RN Care Coordinator's assessment and plan.     Tabitha Sommers MD

## 2020-11-12 ENCOUNTER — APPOINTMENT (OUTPATIENT)
Dept: LAB | Facility: OTHER | Age: 40
End: 2020-11-12
Attending: FAMILY MEDICINE
Payer: COMMERCIAL

## 2020-11-12 ENCOUNTER — PATIENT OUTREACH (OUTPATIENT)
Dept: CARE COORDINATION | Facility: OTHER | Age: 40
End: 2020-11-12

## 2020-11-12 ENCOUNTER — OFFICE VISIT (OUTPATIENT)
Dept: FAMILY MEDICINE | Facility: OTHER | Age: 40
End: 2020-11-12
Attending: FAMILY MEDICINE
Payer: COMMERCIAL

## 2020-11-12 VITALS
OXYGEN SATURATION: 99 % | TEMPERATURE: 97.8 F | HEART RATE: 77 BPM | DIASTOLIC BLOOD PRESSURE: 86 MMHG | SYSTOLIC BLOOD PRESSURE: 140 MMHG | BODY MASS INDEX: 24.73 KG/M2 | WEIGHT: 139.6 LBS

## 2020-11-12 DIAGNOSIS — F11.90 OPIOID USE DISORDER: Primary | ICD-10-CM

## 2020-11-12 LAB
ALBUMIN SERPL-MCNC: 3.5 G/DL (ref 3.4–5)
ALP SERPL-CCNC: 105 U/L (ref 40–150)
ALT SERPL W P-5'-P-CCNC: 16 U/L (ref 0–50)
AMPHETAMINES UR QL: NOT DETECTED NG/ML
ANION GAP SERPL CALCULATED.3IONS-SCNC: <1 MMOL/L (ref 3–14)
AST SERPL W P-5'-P-CCNC: 11 U/L (ref 0–45)
BARBITURATES UR QL SCN: NOT DETECTED NG/ML
BENZODIAZ UR QL SCN: NOT DETECTED NG/ML
BILIRUB DIRECT SERPL-MCNC: <0.1 MG/DL (ref 0–0.2)
BILIRUB SERPL-MCNC: 0.1 MG/DL (ref 0.2–1.3)
BUN SERPL-MCNC: 9 MG/DL (ref 7–30)
BUPRENORPHINE UR QL: NOT DETECTED NG/ML
CALCIUM SERPL-MCNC: 8.6 MG/DL (ref 8.5–10.1)
CANNABINOIDS UR QL: NOT DETECTED NG/ML
CHLORIDE SERPL-SCNC: 101 MMOL/L (ref 94–109)
CO2 SERPL-SCNC: 38 MMOL/L (ref 20–32)
COCAINE UR QL SCN: NOT DETECTED NG/ML
CREAT SERPL-MCNC: 0.65 MG/DL (ref 0.52–1.04)
D-METHAMPHET UR QL: NOT DETECTED NG/ML
ERYTHROCYTE [DISTWIDTH] IN BLOOD BY AUTOMATED COUNT: 12.1 % (ref 10–15)
GFR SERPL CREATININE-BSD FRML MDRD: >90 ML/MIN/{1.73_M2}
GLUCOSE SERPL-MCNC: 89 MG/DL (ref 70–99)
HCG UR QL: NEGATIVE
HCT VFR BLD AUTO: 37.1 % (ref 35–47)
HGB BLD-MCNC: 12.4 G/DL (ref 11.7–15.7)
MCH RBC QN AUTO: 30.9 PG (ref 26.5–33)
MCHC RBC AUTO-ENTMCNC: 33.4 G/DL (ref 31.5–36.5)
MCV RBC AUTO: 93 FL (ref 78–100)
METHADONE UR QL SCN: NOT DETECTED NG/ML
OPIATES UR QL SCN: ABNORMAL NG/ML
OXYCODONE UR QL SCN: NOT DETECTED NG/ML
PCP UR QL SCN: NOT DETECTED NG/ML
PLATELET # BLD AUTO: 359 10E9/L (ref 150–450)
POTASSIUM SERPL-SCNC: 3.4 MMOL/L (ref 3.4–5.3)
PROPOXYPH UR QL: NOT DETECTED NG/ML
PROT SERPL-MCNC: 6.9 G/DL (ref 6.8–8.8)
RBC # BLD AUTO: 4.01 10E12/L (ref 3.8–5.2)
SODIUM SERPL-SCNC: 139 MMOL/L (ref 133–144)
TRICYCLICS UR QL SCN: NOT DETECTED NG/ML
WBC # BLD AUTO: 6 10E9/L (ref 4–11)

## 2020-11-12 PROCEDURE — 99214 OFFICE O/P EST MOD 30 MIN: CPT | Performed by: FAMILY MEDICINE

## 2020-11-12 PROCEDURE — 36415 COLL VENOUS BLD VENIPUNCTURE: CPT | Mod: ZL | Performed by: FAMILY MEDICINE

## 2020-11-12 PROCEDURE — 80048 BASIC METABOLIC PNL TOTAL CA: CPT | Mod: ZL | Performed by: FAMILY MEDICINE

## 2020-11-12 PROCEDURE — G0463 HOSPITAL OUTPT CLINIC VISIT: HCPCS | Mod: 25

## 2020-11-12 PROCEDURE — 86706 HEP B SURFACE ANTIBODY: CPT | Mod: ZL | Performed by: FAMILY MEDICINE

## 2020-11-12 PROCEDURE — 81025 URINE PREGNANCY TEST: CPT | Mod: ZL | Performed by: FAMILY MEDICINE

## 2020-11-12 PROCEDURE — 86803 HEPATITIS C AB TEST: CPT | Mod: ZL | Performed by: FAMILY MEDICINE

## 2020-11-12 PROCEDURE — 87340 HEPATITIS B SURFACE AG IA: CPT | Mod: ZL | Performed by: FAMILY MEDICINE

## 2020-11-12 PROCEDURE — 80306 DRUG TEST PRSMV INSTRMNT: CPT | Mod: ZL | Performed by: FAMILY MEDICINE

## 2020-11-12 PROCEDURE — 87389 HIV-1 AG W/HIV-1&-2 AB AG IA: CPT | Mod: ZL | Performed by: FAMILY MEDICINE

## 2020-11-12 PROCEDURE — 80076 HEPATIC FUNCTION PANEL: CPT | Mod: ZL | Performed by: FAMILY MEDICINE

## 2020-11-12 PROCEDURE — 85027 COMPLETE CBC AUTOMATED: CPT | Mod: ZL | Performed by: FAMILY MEDICINE

## 2020-11-12 RX ORDER — BUPRENORPHINE AND NALOXONE 4; 1 MG/1; MG/1
1 FILM, SOLUBLE BUCCAL; SUBLINGUAL 2 TIMES DAILY
Qty: 14 EACH | Refills: 0 | Status: SHIPPED | OUTPATIENT
Start: 2020-11-12 | End: 2020-11-20

## 2020-11-12 ASSESSMENT — PAIN SCALES - GENERAL: PAINLEVEL: NO PAIN (0)

## 2020-11-12 NOTE — Clinical Note
Is this case reportable?  She has been using at home.  Kids live with her - youngest is 11.  See note.  Thank you!

## 2020-11-12 NOTE — NURSING NOTE
"Chief Complaint   Patient presents with     Recheck Medication       Initial BP (!) 140/86 (BP Location: Right arm, Patient Position: Chair, Cuff Size: Adult Regular)   Pulse 77   Temp 97.8  F (36.6  C) (Tympanic)   Wt 63.3 kg (139 lb 9.6 oz)   SpO2 99%   BMI 24.73 kg/m   Estimated body mass index is 24.73 kg/m  as calculated from the following:    Height as of 2/19/20: 1.6 m (5' 3\").    Weight as of this encounter: 63.3 kg (139 lb 9.6 oz).  Medication Reconciliation: complete  Yan Woods LPN  "

## 2020-11-16 LAB
HBV SURFACE AB SERPL IA-ACNC: 0 M[IU]/ML
HBV SURFACE AG SERPL QL IA: NONREACTIVE
HCV AB SERPL QL IA: NONREACTIVE
HIV 1+2 AB+HIV1 P24 AG SERPL QL IA: NONREACTIVE

## 2020-11-19 NOTE — PROGRESS NOTES
"The author of this note documented a reason for not sharing it with the patient.  Follow-up Buprenorphine Visit           HPI       Brissa Santana is here for f/u on buprenophine/naloxone (Suboxone) therapy for opioid use disorder.  Recheck Medication and Imm/Inj    Brissa is currently taking 4/1 mg of Buprenorphine/Naloxone 2 times daily.     Status since last visit:    Since last visit patient has been: doing well.     Intensity:     There has been: no craving.      Suboxone Dose: adequate.    Progression of Symptoms:     Cues to use and relapse none    Recovery program has been: active.   Accompanying Signs & Symptoms:    Side Effects: none.    Sobriety:     Status: no use since last visit.      Drug Screen: obtained.    Precipitating factors:    Triggers have been: present - people have reached out to offer drugs - patient has been able to easily respond and say \"I'm sober\"  Alleviating factors:    Contact with sponsor has been: no sponsor.     Family and support system has been: helpful.   Other Therapies Tried :     Patient has been going to recovery meetings:not at all.      Other medical concerns: No    Any ED visits? No     Patient got pulled over on her way here.  She does not have a 's license.   waiting for her in the parking lot.  She is concerned - as she was told in the past - she would be jailed.    History   Smoking Status     Former Smoker     Packs/day: 1.00     Years: 15.00     Types: Cigarettes     Quit date: 3/11/2017   Smokeless Tobacco     Never Used     Comment: Tried to quit (yes); longest period tobacco-free- 9 months       Problem, Medication and Allergy Lists were reviewed and updated if needed.  reviewed and are current..    Pharmacy Database reviewed? Yes, 11.20.20, as expected.    Patient is an established patient of this clinic..         Review of Systems:   Review of Systems  CONSTITUTIONAL: NEGATIVE for fever, chills, change in weight  INTEGUMENTARY/SKIN: " "NEGATIVE for worrisome rashes, moles or lesions  EYES: NEGATIVE for vision changes or irritation  ENT/MOUTH: NEGATIVE for ear, mouth and throat problems  RESP: NEGATIVE for significant cough or SOB  CV: NEGATIVE for chest pain, palpitations or peripheral edema  GI: NEGATIVE for nausea, abdominal pain, heartburn, or change in bowel habits  : NEGATIVE for frequency, dysuria, or hematuria  MUSCULOSKELETAL: NEGATIVE for significant arthralgias or myalgia  NEURO: NEGATIVE for weakness, dizziness or paresthesias  ENDOCRINE: NEGATIVE for temperature intolerance, skin/hair changes  PSYCHIATRIC: NEGATIVE for changes in mood or affect          Physical Exam:     Vitals:    11/20/20 1338   BP: 128/80   BP Location: Right arm   Patient Position: Sitting   Cuff Size: Adult Regular   Pulse: 90   Temp: 98.8  F (37.1  C)   TempSrc: Tympanic   SpO2: 98%   Weight: 63.5 kg (140 lb)   Height: 1.6 m (5' 3\")     Body mass index is 24.8 kg/m .  Vitals were reviewed   Physical Exam  GENERAL APPEARANCE: alert, comfortable appearing  EYES: Eyes grossly normal to inspection  HENT:  nose no rhinorrhea  RESP: lungs clear to auscultation - no rales, rhonchi or wheezes and no resp distress  CV: regular rates and rhythm, normal S1 S2,no murmur   ABDOMEN:soft, non-tender, non-distended, no hepatosplenomegaly or masses  SKIN: no rashes, no jaundice, no obvious masses.   NEURO:  no tremor  MENTAL STATUS EXAM:  Appearance/Behavior:No apparent distress  Speech: Normal  Mood/Affect: anxious  Insight: Fair      Results:    Results from the last 24 hours  Results for orders placed or performed in visit on 11/20/20 (from the past 24 hour(s))   Urine Drugs of Abuse Screen (Tox13)   Result Value Ref Range    Cannabinoids (22-oid-1-carboxy-9-THC) Not Detected NDET^Not Detected ng/mL    Phencyclidine (Phencyclidine) Not Detected NDET^Not Detected ng/mL    Cocaine (Benzoylecgonine) Not Detected NDET^Not Detected ng/mL    Methamphetamine (d-Methamphetamine) " Not Detected NDET^Not Detected ng/mL    Opiates (Morphine) Not Detected NDET^Not Detected ng/mL    Amphetamine (d-Amphetamine) Not Detected NDET^Not Detected ng/mL    Benzodiazepines (Nordiazepam) Not Detected NDET^Not Detected ng/mL    Tricyclic Antidepressants (Desipramine) Not Detected NDET^Not Detected ng/mL    Methadone (Methadone) Not Detected NDET^Not Detected ng/mL    Barbiturates (Butalbital) Not Detected NDET^Not Detected ng/mL    Oxycodone (Oxycodone) Not Detected NDET^Not Detected ng/mL    Propoxyphene (Norpropoxyphene) Not Detected NDET^Not Detected ng/mL    Buprenorphine (Buprenorphine) Detected, Abnormal Result (A) NDET^Not Detected ng/mL       Assessment and Plan     Was naloxone nasal spray prescribed? Yes.  1. Opioid use disorder (H)  Doing well back on suboxone.  - Urine Drugs of Abuse Screen (Tox13)    Dosage will not need adjustment today.  Continue at 4 mg  2 time(s) a day of  buprenorphine/naloxone (Suboxone).     Follow up Plan  Stage 2 (2 weeks): Please make a clinic appointment for follow up with me (TABITHA STATON) or another Suboxone provider in 2 weeks for suboxone follow up.    Greater than 10 minutes was spent with this patient, over half of which was on counseling and coordination of care which includes importance of recovery activities,which may include  attendance at NA/AA meetings, sober support network, and the importance of not isolating, being open, honest, and willing to change, possible triggers and how to avoid them, and managing cravings.    There are no discontinued medications.    Options for treatment and follow-up care were reviewed with the patient. Brissa engaged in the decision making process and verbalized understanding of the options discussed and agreed with the final plan.    I saw and examined this patient.  I have read through the note and made appropriate changes and agree with the RN Care Coordinator's assessment and plan.       Tabitha Staton MD

## 2020-11-20 ENCOUNTER — APPOINTMENT (OUTPATIENT)
Dept: LAB | Facility: OTHER | Age: 40
End: 2020-11-20
Attending: FAMILY MEDICINE
Payer: COMMERCIAL

## 2020-11-20 ENCOUNTER — OFFICE VISIT (OUTPATIENT)
Dept: FAMILY MEDICINE | Facility: OTHER | Age: 40
End: 2020-11-20
Attending: FAMILY MEDICINE
Payer: COMMERCIAL

## 2020-11-20 ENCOUNTER — PATIENT OUTREACH (OUTPATIENT)
Dept: CARE COORDINATION | Facility: OTHER | Age: 40
End: 2020-11-20

## 2020-11-20 VITALS
WEIGHT: 140 LBS | BODY MASS INDEX: 24.8 KG/M2 | DIASTOLIC BLOOD PRESSURE: 80 MMHG | TEMPERATURE: 98.8 F | HEIGHT: 63 IN | OXYGEN SATURATION: 98 % | SYSTOLIC BLOOD PRESSURE: 128 MMHG | HEART RATE: 90 BPM

## 2020-11-20 DIAGNOSIS — F11.90 OPIOID USE DISORDER: Primary | ICD-10-CM

## 2020-11-20 PROCEDURE — 99213 OFFICE O/P EST LOW 20 MIN: CPT | Performed by: FAMILY MEDICINE

## 2020-11-20 PROCEDURE — 80306 DRUG TEST PRSMV INSTRMNT: CPT | Mod: ZL | Performed by: FAMILY MEDICINE

## 2020-11-20 PROCEDURE — G0463 HOSPITAL OUTPT CLINIC VISIT: HCPCS

## 2020-11-20 RX ORDER — BUPRENORPHINE AND NALOXONE 4; 1 MG/1; MG/1
1 FILM, SOLUBLE BUCCAL; SUBLINGUAL 2 TIMES DAILY
Qty: 28 EACH | Refills: 0 | Status: SHIPPED | OUTPATIENT
Start: 2020-11-20 | End: 2021-04-07

## 2020-11-20 ASSESSMENT — PATIENT HEALTH QUESTIONNAIRE - PHQ9: SUM OF ALL RESPONSES TO PHQ QUESTIONS 1-9: 8

## 2020-11-20 ASSESSMENT — PAIN SCALES - GENERAL: PAINLEVEL: NO PAIN (0)

## 2020-11-20 ASSESSMENT — MIFFLIN-ST. JEOR: SCORE: 1269.17

## 2020-11-20 NOTE — PATIENT INSTRUCTIONS
Urine drug screen is as to be expected.    Continue Suboxone.  Follow up 2 weeks.    Results for orders placed or performed in visit on 11/20/20 (from the past 24 hour(s))   Urine Drugs of Abuse Screen (Tox13)   Result Value Ref Range    Cannabinoids (24-joz-3-carboxy-9-THC) Not Detected NDET^Not Detected ng/mL    Phencyclidine (Phencyclidine) Not Detected NDET^Not Detected ng/mL    Cocaine (Benzoylecgonine) Not Detected NDET^Not Detected ng/mL    Methamphetamine (d-Methamphetamine) Not Detected NDET^Not Detected ng/mL    Opiates (Morphine) Not Detected NDET^Not Detected ng/mL    Amphetamine (d-Amphetamine) Not Detected NDET^Not Detected ng/mL    Benzodiazepines (Nordiazepam) Not Detected NDET^Not Detected ng/mL    Tricyclic Antidepressants (Desipramine) Not Detected NDET^Not Detected ng/mL    Methadone (Methadone) Not Detected NDET^Not Detected ng/mL    Barbiturates (Butalbital) Not Detected NDET^Not Detected ng/mL    Oxycodone (Oxycodone) Not Detected NDET^Not Detected ng/mL    Propoxyphene (Norpropoxyphene) Not Detected NDET^Not Detected ng/mL    Buprenorphine (Buprenorphine) Detected, Abnormal Result (A) NDET^Not Detected ng/mL

## 2020-11-20 NOTE — NURSING NOTE
"Chief Complaint   Patient presents with     Recheck Medication       Initial /80 (BP Location: Right arm, Patient Position: Sitting, Cuff Size: Adult Regular)   Pulse 90   Temp 98.8  F (37.1  C) (Tympanic)   Ht 1.6 m (5' 3\")   Wt 63.5 kg (140 lb)   SpO2 98%   BMI 24.80 kg/m   Estimated body mass index is 24.8 kg/m  as calculated from the following:    Height as of this encounter: 1.6 m (5' 3\").    Weight as of this encounter: 63.5 kg (140 lb).  Medication Reconciliation: complete  Mónica Garcia LPN  "

## 2020-11-20 NOTE — NURSING NOTE
"Chief Complaint   Patient presents with     Recheck Medication     Imm/Inj     flu       Initial /80 (BP Location: Right arm, Patient Position: Sitting, Cuff Size: Adult Regular)   Pulse 90   Temp 98.8  F (37.1  C) (Tympanic)   Ht 1.6 m (5' 3\")   Wt 63.5 kg (140 lb)   SpO2 98%   BMI 24.80 kg/m   Estimated body mass index is 24.8 kg/m  as calculated from the following:    Height as of this encounter: 1.6 m (5' 3\").    Weight as of this encounter: 63.5 kg (140 lb).  Medication Reconciliation: complete  Mónica Garcia LPN  "

## 2021-04-07 ENCOUNTER — OFFICE VISIT (OUTPATIENT)
Dept: FAMILY MEDICINE | Facility: OTHER | Age: 41
End: 2021-04-07
Attending: FAMILY MEDICINE
Payer: COMMERCIAL

## 2021-04-07 VITALS
HEART RATE: 105 BPM | SYSTOLIC BLOOD PRESSURE: 138 MMHG | WEIGHT: 123 LBS | BODY MASS INDEX: 21.79 KG/M2 | OXYGEN SATURATION: 100 % | HEIGHT: 63 IN | DIASTOLIC BLOOD PRESSURE: 76 MMHG | TEMPERATURE: 98.2 F

## 2021-04-07 DIAGNOSIS — Z20.2 SEXUALLY TRANSMITTED DISEASE EXPOSURE: Primary | ICD-10-CM

## 2021-04-07 DIAGNOSIS — F15.10 METHAMPHETAMINE ABUSE (H): ICD-10-CM

## 2021-04-07 DIAGNOSIS — F11.90 OPIOID USE DISORDER: ICD-10-CM

## 2021-04-07 LAB
SPECIMEN SOURCE: NORMAL
WET PREP SPEC: NORMAL

## 2021-04-07 PROCEDURE — 86780 TREPONEMA PALLIDUM: CPT | Mod: ZL | Performed by: FAMILY MEDICINE

## 2021-04-07 PROCEDURE — 36415 COLL VENOUS BLD VENIPUNCTURE: CPT | Mod: ZL | Performed by: FAMILY MEDICINE

## 2021-04-07 PROCEDURE — 87210 SMEAR WET MOUNT SALINE/INK: CPT | Mod: ZL | Performed by: FAMILY MEDICINE

## 2021-04-07 PROCEDURE — 87389 HIV-1 AG W/HIV-1&-2 AB AG IA: CPT | Mod: ZL | Performed by: FAMILY MEDICINE

## 2021-04-07 PROCEDURE — 87591 N.GONORRHOEAE DNA AMP PROB: CPT | Mod: ZL | Performed by: FAMILY MEDICINE

## 2021-04-07 PROCEDURE — 96372 THER/PROPH/DIAG INJ SC/IM: CPT

## 2021-04-07 PROCEDURE — 99214 OFFICE O/P EST MOD 30 MIN: CPT | Performed by: FAMILY MEDICINE

## 2021-04-07 PROCEDURE — G0463 HOSPITAL OUTPT CLINIC VISIT: HCPCS | Mod: 25

## 2021-04-07 PROCEDURE — 87491 CHLMYD TRACH DNA AMP PROBE: CPT | Mod: ZL | Performed by: FAMILY MEDICINE

## 2021-04-07 RX ORDER — CEFTRIAXONE SODIUM 250 MG
250 VIAL (EA) INJECTION ONCE
Status: COMPLETED | OUTPATIENT
Start: 2021-04-07 | End: 2021-04-07

## 2021-04-07 RX ADMIN — CEFTRIAXONE SODIUM 250 MG: 250 INJECTION, POWDER, FOR SOLUTION INTRAMUSCULAR; INTRAVENOUS at 12:10

## 2021-04-07 ASSESSMENT — ANXIETY QUESTIONNAIRES
1. FEELING NERVOUS, ANXIOUS, OR ON EDGE: MORE THAN HALF THE DAYS
3. WORRYING TOO MUCH ABOUT DIFFERENT THINGS: NEARLY EVERY DAY
IF YOU CHECKED OFF ANY PROBLEMS ON THIS QUESTIONNAIRE, HOW DIFFICULT HAVE THESE PROBLEMS MADE IT FOR YOU TO DO YOUR WORK, TAKE CARE OF THINGS AT HOME, OR GET ALONG WITH OTHER PEOPLE: SOMEWHAT DIFFICULT
5. BEING SO RESTLESS THAT IT IS HARD TO SIT STILL: NOT AT ALL
7. FEELING AFRAID AS IF SOMETHING AWFUL MIGHT HAPPEN: SEVERAL DAYS
4. TROUBLE RELAXING: NEARLY EVERY DAY
2. NOT BEING ABLE TO STOP OR CONTROL WORRYING: NEARLY EVERY DAY
GAD7 TOTAL SCORE: 15
6. BECOMING EASILY ANNOYED OR IRRITABLE: NEARLY EVERY DAY

## 2021-04-07 ASSESSMENT — MIFFLIN-ST. JEOR: SCORE: 1192.05

## 2021-04-07 ASSESSMENT — PATIENT HEALTH QUESTIONNAIRE - PHQ9: SUM OF ALL RESPONSES TO PHQ QUESTIONS 1-9: 14

## 2021-04-07 ASSESSMENT — PAIN SCALES - GENERAL: PAINLEVEL: NO PAIN (0)

## 2021-04-07 NOTE — NURSING NOTE
"Chief Complaint   Patient presents with     STD       Initial BP (!) 150/80   Pulse 105   Temp 98.2  F (36.8  C)   Ht 1.6 m (5' 3\")   Wt 55.8 kg (123 lb)   SpO2 100%   BMI 21.79 kg/m   Estimated body mass index is 21.79 kg/m  as calculated from the following:    Height as of this encounter: 1.6 m (5' 3\").    Weight as of this encounter: 55.8 kg (123 lb).  Medication Reconciliation: complete  Corbin Valencia LPN  "

## 2021-04-07 NOTE — NURSING NOTE
Clinic Administered Medication Documentation      Injectable Medication Documentation    Patient was given Ceftriaxone Sodium (Rocephin). Prior to medication administration, verified patients identity using patient s name and date of birth. Please see MAR and medication order for additional information. Patient instructed to remain in clinic for 15 minutes.      Was entire vial of medication used? Yes  Vial/Syringe: Single dose vial  Expiration Date:  06/2021  Was this medication supplied by the patient? No   Corbin Valencia LPN

## 2021-04-07 NOTE — PROGRESS NOTES
Assessment & Plan     Sexually transmitted disease exposure  GC exposure.   Rocephin IM given and will test   - GC/Chlamydia by PCR - [facility]  - Wet prep  - HIV Antigen Antibody Combo  - Treponema Abs w Reflex to RPR and Titer  - cefTRIAXone (ROCEPHIN) injection 250 mg    Opioid use disorder (H)  Long discussion - Narcan given and HIGHLY recommend to get back into Suboxone program.  She has phone numbers and contact for our .     - naloxone (NARCAN) 4 MG/0.1ML nasal spray; Spray 1 spray (4 mg) into one nostril alternating nostrils as needed for opioid reversal every 2-3 minutes until assistance arrives    Methamphetamine abuse (H)  As above. Needs help but has to ask for it. Pt is aware of the risk of overdose and other disease processes due to using.  Pt knows our door is open.       2021 E&M time (Optional):[id number]}       35 minutes in total time with pt for counseling and documentation along with getting a exam and history     No follow-ups on file.    Abhilash Philip MD  [facility] - RADHA Brumfield is a 40 year old who presents for the following health issues     HPI     Concern - STD  Onset: would like to get tested for all STI's  Description: exposure  Intensity: none  Progression of Symptoms:  n/a  Accompanying Signs & Symptoms: n/a  Previous history of similar problem: yes  Precipitating factors:        Worsened by: n/a  Alleviating factors:        Improved by: n/a  Therapies tried and outcome:  none   Pt has been exposed to GC with SO.   Pt feels he has been unfaithful in relationship   She has no sx      Pt is unfortunately back to using meth and heroin- no IVDA but smokes it  She was on suboxone in past and did great till early winter  Has been using since then.  Pt wants to quit but cravings and need to have it is very high.            Review of Systems   CONSTITUTIONAL: NEGATIVE for fever, chills, change in weight  RESP: NEGATIVE for significant cough or  "SOB  CV: NEGATIVE for chest pain, palpitations or peripheral edema      Objective    BP (!) 150/80   Pulse 105   Temp 98.2  F (36.8  C)   Ht 1.6 m (5' 3\")   Wt 55.8 kg (123 lb)   SpO2 100%   BMI 21.79 kg/m    Body mass index is 21.79 kg/m .  Physical Exam   GENERAL: healthy, alert and no distress-- seems sober but anxious normal cervix and minimal  discharge    Results for orders placed or performed in visit on 04/07/21   Wet prep     Status: None    Specimen: Vagina   Result Value Ref Range    Specimen Description Vagina     Wet Prep No Trichomonas seen     Wet Prep No clue cells seen     Wet Prep No yeast seen     Wet Prep Rare  WBC'S seen                    "

## 2021-04-08 LAB
C TRACH DNA SPEC QL NAA+PROBE: NOT DETECTED
HIV 1+2 AB+HIV1 P24 AG SERPL QL IA: NONREACTIVE
N GONORRHOEA DNA SPEC QL NAA+PROBE: NOT DETECTED
SPECIMEN SOURCE: NORMAL
T PALLIDUM AB SER QL: NONREACTIVE

## 2021-04-08 ASSESSMENT — ANXIETY QUESTIONNAIRES: GAD7 TOTAL SCORE: 15

## 2021-04-09 ENCOUNTER — TELEPHONE (OUTPATIENT)
Dept: FAMILY MEDICINE | Facility: OTHER | Age: 41
End: 2021-04-09

## 2021-04-12 ENCOUNTER — APPOINTMENT (OUTPATIENT)
Dept: LAB | Facility: OTHER | Age: 41
End: 2021-04-12
Attending: FAMILY MEDICINE
Payer: COMMERCIAL

## 2021-04-12 ENCOUNTER — PATIENT OUTREACH (OUTPATIENT)
Dept: CARE COORDINATION | Facility: OTHER | Age: 41
End: 2021-04-12

## 2021-04-12 ENCOUNTER — OFFICE VISIT (OUTPATIENT)
Dept: FAMILY MEDICINE | Facility: OTHER | Age: 41
End: 2021-04-12
Attending: FAMILY MEDICINE
Payer: COMMERCIAL

## 2021-04-12 VITALS
HEART RATE: 94 BPM | SYSTOLIC BLOOD PRESSURE: 146 MMHG | OXYGEN SATURATION: 97 % | BODY MASS INDEX: 22.85 KG/M2 | TEMPERATURE: 99 F | DIASTOLIC BLOOD PRESSURE: 80 MMHG | WEIGHT: 129 LBS

## 2021-04-12 DIAGNOSIS — F15.20 METHAMPHETAMINE USE DISORDER, MODERATE (H): ICD-10-CM

## 2021-04-12 DIAGNOSIS — F11.90 OPIOID USE DISORDER: Primary | ICD-10-CM

## 2021-04-12 PROCEDURE — G0463 HOSPITAL OUTPT CLINIC VISIT: HCPCS

## 2021-04-12 PROCEDURE — 80307 DRUG TEST PRSMV CHEM ANLYZR: CPT | Mod: ZL | Performed by: FAMILY MEDICINE

## 2021-04-12 PROCEDURE — 80306 DRUG TEST PRSMV INSTRMNT: CPT | Mod: ZL | Performed by: FAMILY MEDICINE

## 2021-04-12 PROCEDURE — 99214 OFFICE O/P EST MOD 30 MIN: CPT | Performed by: FAMILY MEDICINE

## 2021-04-12 RX ORDER — BUPRENORPHINE HYDROCHLORIDE, NALOXONE HYDROCHLORIDE 4; 1 MG/1; MG/1
1 FILM, SOLUBLE BUCCAL; SUBLINGUAL DAILY
Qty: 5 EACH | Refills: 0 | Status: SHIPPED | OUTPATIENT
Start: 2021-04-12 | End: 2021-04-21

## 2021-04-12 ASSESSMENT — PAIN SCALES - GENERAL: PAINLEVEL: NO PAIN (0)

## 2021-04-12 NOTE — PROGRESS NOTES
Clinic Care Coordination Contact  Care Team Conversations    CC attended office visit with pt and Dr. Stack this date.  Please refer to Dr. Stack's note for plan of care.  Struggling this date.  Very emotional and upset with self.  Did commend her for showing up today.   Does need additional resources at this time, such as outpatient, counseling, etc.  She is open to this.  Will restart her medication and get additional resources once she is feeling better and not actively using substances.  She is agreeable to this.  All questions answered.  Encouraged her to call/text CC with any questions/concerns/problems.  Verbalized understanding.    Vera Robert RN-BSN, [facility] Care Coordinator  [phone] opt. #3

## 2021-04-12 NOTE — Clinical Note
New referral.  Relapsed x 6 months. Kids in home - youngest is 13.  Interested in resources for treatment.   She will be back, hopefully, this Friday.

## 2021-04-12 NOTE — PATIENT INSTRUCTIONS
Restart Suboxone   Need to be sure you are in withdrawal.  [phone] - call this number for ride set up.    Follow up Friday.  Referral for for .

## 2021-04-12 NOTE — NURSING NOTE
"Chief Complaint   Patient presents with     Recheck Medication       Initial BP (!) 146/80 (BP Location: Right arm, Patient Position: Chair, Cuff Size: Adult Regular)   Pulse 94   Temp 99  F (37.2  C) (Tympanic)   Wt 58.5 kg (129 lb)   SpO2 97%   BMI 22.85 kg/m   Estimated body mass index is 22.85 kg/m  as calculated from the following:    Height as of 4/7/21: 1.6 m (5' 3\").    Weight as of this encounter: 58.5 kg (129 lb).  Medication Reconciliation: complete  Yan Woods LPN  "

## 2021-04-12 NOTE — PROGRESS NOTES
Follow-up Buprenorphine Visit           HPI       Brissa Santana is here for f/u on buprenophine/naloxone (Suboxone) therapy for opioid use disorder.  Recheck Medication      Brissa is currently taking 4/1 mg of Buprenorphine/Naloxone 2 times daily. Has been out for almost 5 months.      Status since last visit:    Since last visit patient has been: struggling. Using meth and heroin every day.  Had a fight with her 19 year old daughter.   Using a couple points of heroin daily (smoking) - 11AM, meth = 3-4 lines a day (snorts) 45 minutes ago.  Last Suboxone use was November.    Intensity:     There has been: intense craving.      Suboxone Dose: adequate.  When taking as prescribed.  Progression of Symptoms:     Cues to use and relapse Admits to meth and heroin use.      Recovery program has been: weak.   Accompanying Signs & Symptoms:    Side Effects: Hasn't been taking.    Sobriety:     Status: patient has relapsed.      Drug Screen: obtained.    Precipitating factors:    Triggers have been: mild - family stressors - neighbor.    Alleviating factors:    Contact with sponsor has been: no sponsor.     Family and support system has been: a problem.  Other Therapies Tried :     Patient has been going to recovery meetings:not at all.      Other medical concerns: No    Any ED visits? No     Did talk about inpatient treatment and counseling    Relapsed on meth first.  A week later - heroin.  Using in basement.  Using at other users' homes.  Neighbors.     No use of Suboxone off the street.    Living in [place].  With kids.  Not working.      History   Smoking Status     Former Smoker     Packs/day: 1.00     Years: 15.00     Types: Cigarettes     Quit date: 3/11/2017   Smokeless Tobacco     Never Used     Comment: Tried to quit (yes); longest period tobacco-free- 9 months       Problem, Medication and Allergy Lists were reviewed and updated if needed.  reviewed and are current..    Pharmacy Database reviewed? Yes,  4.12.21, as expected.  Last fill 11.20.20 #28 - 14 day supply.    Patient is an established patient of this clinic..         Review of Systems:   Review of Systems  CONSTITUTIONAL: NEGATIVE for fever, chills, change in weight  INTEGUMENTARY/SKIN: NEGATIVE for worrisome rashes, moles or lesions  EYES: NEGATIVE for vision changes or irritation  ENT/MOUTH: NEGATIVE for ear, mouth and throat problems  RESP: NEGATIVE for significant cough or SOB  CV: NEGATIVE for chest pain, palpitations or peripheral edema  GI: NEGATIVE for nausea, abdominal pain, heartburn, or change in bowel habits  : NEGATIVE for frequency, dysuria, or hematuria  MUSCULOSKELETAL: NEGATIVE for significant arthralgias or myalgia  NEURO: NEGATIVE for weakness, dizziness or paresthesias  ENDOCRINE: NEGATIVE for temperature intolerance, skin/hair changes  PSYCHIATRIC: NEGATIVE for changes in mood or affect          Physical Exam:     Vitals:    04/12/21 1438   BP: (!) 146/80   BP Location: Right arm   Patient Position: Chair   Cuff Size: Adult Regular   Pulse: 94   Temp: 99  F (37.2  C)   TempSrc: Tympanic   SpO2: 97%   Weight: 58.5 kg (129 lb)     Body mass index is 22.85 kg/m .  Vitals were reviewed   Physical Exam  GENERAL APPEARANCE: appears intoxicated  EYES: Eyes grossly normal to inspection  HENT:  nose no rhinorrhea  RESP: lungs clear to auscultation - no rales, rhonchi or wheezes and no resp distress  CV: regular rates and rhythm, normal S1 S2,no murmur   ABDOMEN:soft, non-tender, non-distended, no hepatosplenomegaly or masses  SKIN: no rashes, no jaundice, no obvious masses.   NEURO:  no tremor   MENTAL STATUS EXAM:  Appearance/Behavior:Restless  Speech: Normal  Mood/Affect: depressed affect  Insight: Limited      Results:    Results from the last 24 hours  Results for orders placed or performed in visit on 04/12/21 (from the past 24 hour(s))   Urine Drugs of Abuse Screen (Tox13)   Result Value Ref Range    Cannabinoids  (08-iot-4-carboxy-9-THC) Not Detected NDET^Not Detected ng/mL    Phencyclidine (Phencyclidine) Not Detected NDET^Not Detected ng/mL    Cocaine (Benzoylecgonine) Not Detected NDET^Not Detected ng/mL    Methamphetamine (d-Methamphetamine) Not Detected NDET^Not Detected ng/mL    Opiates (Morphine) Not Detected NDET^Not Detected ng/mL    Amphetamine (d-Amphetamine) Not Detected NDET^Not Detected ng/mL    Benzodiazepines (Nordiazepam) Not Detected NDET^Not Detected ng/mL    Tricyclic Antidepressants (Desipramine) Not Detected NDET^Not Detected ng/mL    Methadone (Methadone) Not Detected NDET^Not Detected ng/mL    Barbiturates (Butalbital) Not Detected NDET^Not Detected ng/mL    Oxycodone (Oxycodone) Not Detected NDET^Not Detected ng/mL    Propoxyphene (Norpropoxyphene) Not Detected NDET^Not Detected ng/mL    Buprenorphine (Buprenorphine) Not Detected NDET^Not Detected ng/mL       Assessment and Plan     Was naloxone nasal spray prescribed? Yes.  1. Opioid use disorder (H)  Relapse x months.  Last use few hours ago.  Aware she needs to be in withdrawal to restart Suboxone.  Urine drug testing shows nothing despite daily use of both heroin and meth.  Suspect Fentanyl.  Will send for confirmatory.    Start with 4 mg daily.  Patient declined Zofran or other PRN.  Discussed need for structure - treatment options, sponsor, meetings/counselor.  Will discuss more when back in a few days and more clear headed.  Has Narcan at home.  Scheduled to see us on Friday - 4 days from now.  Referral to social work as well.  Kids in the home - needs to reported.  - Urine Drugs of Abuse Screen (Tox13)  - SUBOXONE 4-1 MG per film; Place 1 Film under the tongue daily  Dispense: 5 each; Refill: 0  - CARE COORDINATION REFERRAL    2. Methamphetamine use disorder, moderate (H)  As above.  - CARE COORDINATION REFERRAL    Dosage will not need adjustment today.  Start 4 mg/ 1mg buprenorphine/naloxone (Suboxone).     Follow up Plan  Stage 1(1 week):  Please make a clinic appointment for follow up with me (TABITHA STATON) or another Suboxone provider in 1 week for suboxone follow up.    Greater than 15 minutes was spent with this patient, over half of which was on counseling and coordination of care which includes importance of recovery activities,which may include  attendance at NA/AA meetings, sober support network, and the importance of not isolating, being open, honest, and willing to change, possible triggers and how to avoid them, and managing cravings.    There are no discontinued medications.    Options for treatment and follow-up care were reviewed with the patient. Brissa engaged in the decision making process and verbalized understanding of the options discussed and agreed with the final plan.    I saw and examined this patient.  I have read through the note and made appropriate changes and agree with the RN Care Coordinator's assessment and plan.     Tabitha Staton MD

## 2021-04-15 ENCOUNTER — PATIENT OUTREACH (OUTPATIENT)
Dept: CARE COORDINATION | Facility: OTHER | Age: 41
End: 2021-04-15

## 2021-04-15 LAB
AMPHET UR CFM-MCNC: ABNORMAL NG/ML
AMPHET/CREAT UR: 4102 NG/MG{CREAT}
CREAT UR-MCNC: 256 MG/DL
FENTANYL UR CFM-MCNC: 340 NG/ML
FENTANYL/CREAT UR: 133 NG/MG{CREAT}
METHAMPHET UR CFM-MCNC: ABNORMAL NG/ML
METHAMPHET/CREAT UR: ABNORMAL NG/MG{CREAT}
NORFENTANYL UR CFM-MCNC: 2980 NG/ML
NORFENTANYL/CREAT UR: 1164 NG/MG{CREAT}
RPT COMMENT: ABNORMAL

## 2021-04-15 NOTE — PROGRESS NOTES
Follow-up Buprenorphine Visit           HPI       Brissa Santana is here for f/u on buprenophine/naloxone (Suboxone) therapy for opioid use disorder.  Medication Follow-up      Brissa is currently taking 4/1 mg of Buprenorphine/Naloxone 1 time daily.     Status since last visit:   Since last visit patient has been: struggling.   Started Suboxone on Sunday 3 days ago.  4mg wasn't enough.  Took 4mg BID and still feels in withdrawal.  Did go over her confirmatory UDS and she may have precipitated withdrawal due to Fentanyl use.    Intensity:     There has been: moderate craving.      Suboxone Dose: too little.    Progression of Symptoms:     Cues to use and relapse Admits to Meth use on Friday and heroin use on Saturday.   Today is Wednesday.    Recovery program has been: sporadic.   Accompanying Signs & Symptoms:    Side Effects: none.    Sobriety:     Status: patient has relapsed.      Drug Screen: obtained.    Precipitating factors:    Triggers have been: mild.   Alleviating factors:    Contact with sponsor has been: no sponsor.     Family and support system has been: neutral.   Other Therapies Tried :     Patient has been going to recovery meetings:not at all.      Other medical concerns: No    Any ED visits? No       History   Smoking Status     Former Smoker     Packs/day: 1.00     Years: 15.00     Types: Cigarettes     Quit date: 3/11/2017   Smokeless Tobacco     Never Used     Comment: Tried to quit (yes); longest period tobacco-free- 9 months       Problem, Medication and Allergy Lists were reviewed and updated if needed.  reviewed and are current..    Pharmacy Database reviewed? Yes, 4.21.21, as expected.    Patient is an established patient of this clinic..         Review of Systems:   Review of Systems  CONSTITUTIONAL: NEGATIVE for fever, chills, change in weight  INTEGUMENTARY/SKIN: NEGATIVE for worrisome rashes, moles or lesions  EYES: NEGATIVE for vision changes or irritation  ENT/MOUTH: NEGATIVE for  "ear, mouth and throat problems  RESP: NEGATIVE for significant cough or SOB  CV: NEGATIVE for chest pain, palpitations or peripheral edema  GI: NEGATIVE for nausea, abdominal pain, heartburn, or change in bowel habits  : NEGATIVE for frequency, dysuria, or hematuria  MUSCULOSKELETAL: NEGATIVE for significant arthralgias or myalgia  NEURO: NEGATIVE for weakness, dizziness or paresthesias  ENDOCRINE: NEGATIVE for temperature intolerance, skin/hair changes  PSYCHIATRIC: NEGATIVE for changes in mood or affect          Physical Exam:     Vitals:    04/21/21 1458   BP: 132/86   BP Location: Left arm   Patient Position: Sitting   Cuff Size: Adult Regular   Pulse: 88   Temp: 98.2  F (36.8  C)   TempSrc: Tympanic   SpO2: 99%   Weight: 61.2 kg (135 lb)   Height: 1.6 m (5' 3\")     Body mass index is 23.91 kg/m .  Vitals were reviewed   Physical Exam  GENERAL APPEARANCE: alert, comfortable appearing  EYES: Eyes grossly normal to inspection  HENT:  nose no rhinorrhea  RESP: lungs clear to auscultation - no rales, rhonchi or wheezes and no resp distress  CV: regular rates and rhythm, normal S1 S2,no murmur   ABDOMEN:soft, non-tender, non-distended, no hepatosplenomegaly or masses  SKIN: no rashes, no jaundice, no obvious masses.   NEURO:  no tremor  MENTAL STATUS EXAM:  Appearance/Behavior:No apparent distress  Speech: Normal  Mood/Affect: normal affect  Insight: Limited      Results:    Results from the last 24 hours  Results for orders placed or performed in visit on 04/21/21 (from the past 24 hour(s))   Urine Drugs of Abuse Screen (Tox13)   Result Value Ref Range    Cannabinoids (79-lov-2-carboxy-9-THC) Not Detected NDET^Not Detected ng/mL    Phencyclidine (Phencyclidine) Not Detected NDET^Not Detected ng/mL    Cocaine (Benzoylecgonine) Not Detected NDET^Not Detected ng/mL    Methamphetamine (d-Methamphetamine) Not Detected NDET^Not Detected ng/mL    Opiates (Morphine) Not Detected NDET^Not Detected ng/mL    Amphetamine " (d-Amphetamine) Not Detected NDET^Not Detected ng/mL    Benzodiazepines (Nordiazepam) Not Detected NDET^Not Detected ng/mL    Tricyclic Antidepressants (Desipramine) Not Detected NDET^Not Detected ng/mL    Methadone (Methadone) Not Detected NDET^Not Detected ng/mL    Barbiturates (Butalbital) Not Detected NDET^Not Detected ng/mL    Oxycodone (Oxycodone) Not Detected NDET^Not Detected ng/mL    Propoxyphene (Norpropoxyphene) Not Detected NDET^Not Detected ng/mL    Buprenorphine (Buprenorphine) Detected, Abnormal Result (A) NDET^Not Detected ng/mL       Assessment and Plan     Was naloxone nasal spray prescribed? Yes.  1. Opioid use disorder (H)  Did use once since last seen.  Discussed results of drug screen - Fentanyl.  Discussed risk of overdose and death.  Patient willing to look into treatment options, outpatient.   Discussed expanding her support network - structure - meetings, crisis number, sponsor, etc.  Is talking with her kids about things and they are supportive.  Plans to talk to parents when they stay there in next week or 2.  - Urine Drugs of Abuse Screen (Tox13)    2. Methamphetamine use disorder, severe (H)  Ongoing use as above.  UDS negative today.      Dosage will need adjustment today.  Increase to  8 mg  1 time(s) a day of  buprenorphine/naloxone (Suboxone).       Patient Instructions   Increase to 8 mg of Suboxone.    Call for outpatient treatment.    [facility] [phone]  [facility] [phone]  [place] [phone]    Work on support network - contacts - friends, sponsor, meetings, etc.  Follow up 1 week.      Follow up Plan  Stage 1(1 week): Please make a clinic appointment for follow up with me (SANDEEP STATON) or another Suboxone provider in 1 week for suboxone follow up.    Greater than 10 minutes was spent with this patient, over half of which was on counseling and coordination of care which includes importance of recovery activities,which may include  attendance at NA/AA  meetings, sober support network, and the importance of not isolating, being open, honest, and willing to change, possible triggers and how to avoid them, and managing cravings.    Medications Discontinued During This Encounter   Medication Reason     SUBOXONE 4-1 MG per film        Options for treatment and follow-up care were reviewed with the patient. Brissa engaged in the decision making process and verbalized understanding of the options discussed and agreed with the final plan.    I saw and examined this patient.  I have read through the note and made appropriate changes and agree with the RN Care Coordinator's assessment and plan.     Tabitha Sommers MD

## 2021-04-15 NOTE — TELEPHONE ENCOUNTER
Clinic Care Coordination Contact  Care Team Conversations    CC sent Brissa a text this date reminding her of her appt tomorrow and to arrive at 12:45PM.    Vera Robert RN-BSN, [facility] Care Coordinator  [phone] opt. #3

## 2021-04-15 NOTE — PROGRESS NOTES
Dr. Naga Hernandez message relayed on pt's VM per [employer]. To [employer] - please call pt to confirm 11:30 appt with Dr. Tiana Vasquez today. Pt notified via telephone.  She is happy and agreeable with this plan.    Vera Robert RN-BSN  [facility] Care Coordinator  [phone] opt. #3

## 2021-04-16 ENCOUNTER — PATIENT OUTREACH (OUTPATIENT)
Dept: CARE COORDINATION | Facility: OTHER | Age: 41
End: 2021-04-16

## 2021-04-16 NOTE — TELEPHONE ENCOUNTER
"Clinic Care Coordination Contact  Care Team Conversations    Received message from Brissa this morning stating that she would be unable to make it to her appt today.  Has not started her Suboxone yet - \"screwed up\".  Will be starting it tomorrow.  She has decided to make some important life changes so that \"this does not happen again\".  Appt rescheduled to April 21, arrive at 12:45PM.        Vera Robert RN-BSN, [facility] Care Coordinator  [phone] opt. #3          "

## 2021-04-20 ENCOUNTER — PATIENT OUTREACH (OUTPATIENT)
Dept: CARE COORDINATION | Facility: OTHER | Age: 41
End: 2021-04-20

## 2021-04-21 ENCOUNTER — PATIENT OUTREACH (OUTPATIENT)
Dept: CARE COORDINATION | Facility: OTHER | Age: 41
End: 2021-04-21

## 2021-04-21 ENCOUNTER — APPOINTMENT (OUTPATIENT)
Dept: LAB | Facility: OTHER | Age: 41
End: 2021-04-21
Attending: FAMILY MEDICINE
Payer: COMMERCIAL

## 2021-04-21 ENCOUNTER — OFFICE VISIT (OUTPATIENT)
Dept: FAMILY MEDICINE | Facility: OTHER | Age: 41
End: 2021-04-21
Attending: FAMILY MEDICINE
Payer: COMMERCIAL

## 2021-04-21 VITALS
DIASTOLIC BLOOD PRESSURE: 86 MMHG | SYSTOLIC BLOOD PRESSURE: 132 MMHG | HEART RATE: 88 BPM | BODY MASS INDEX: 23.92 KG/M2 | HEIGHT: 63 IN | WEIGHT: 135 LBS | TEMPERATURE: 98.2 F | OXYGEN SATURATION: 99 %

## 2021-04-21 DIAGNOSIS — F15.20 METHAMPHETAMINE USE DISORDER, SEVERE (H): ICD-10-CM

## 2021-04-21 DIAGNOSIS — F11.90 OPIOID USE DISORDER: Primary | ICD-10-CM

## 2021-04-21 PROCEDURE — G0463 HOSPITAL OUTPT CLINIC VISIT: HCPCS | Mod: 25

## 2021-04-21 PROCEDURE — 80306 DRUG TEST PRSMV INSTRMNT: CPT | Mod: ZL | Performed by: FAMILY MEDICINE

## 2021-04-21 PROCEDURE — G0463 HOSPITAL OUTPT CLINIC VISIT: HCPCS

## 2021-04-21 PROCEDURE — 99214 OFFICE O/P EST MOD 30 MIN: CPT | Performed by: FAMILY MEDICINE

## 2021-04-21 RX ORDER — BUPRENORPHINE HYDROCHLORIDE, NALOXONE HYDROCHLORIDE 8; 2 MG/1; MG/1
1 FILM, SOLUBLE BUCCAL; SUBLINGUAL DAILY
Qty: 7 EACH | Refills: 0 | Status: SHIPPED | OUTPATIENT
Start: 2021-04-21 | End: 2021-04-28

## 2021-04-21 ASSESSMENT — MIFFLIN-ST. JEOR: SCORE: 1246.49

## 2021-04-21 ASSESSMENT — PAIN SCALES - GENERAL: PAINLEVEL: NO PAIN (0)

## 2021-04-21 NOTE — PROGRESS NOTES
Clinic Care Coordination Contact  Care Team Conversations    CC attended office visit with pt and Dr. Stack this date.  Please refer to Dr. Stack's note for plan of care.  All questions answered.  Encouraged her to call/text CC with any questions/concerns/problems.  Verbalized understanding.    Vera Robert RN-BSN, [facility] Care Coordinator  [phone] opt. #3

## 2021-04-21 NOTE — PATIENT INSTRUCTIONS
Increase to 8 mg of Suboxone.    Call for outpatient treatment.    [facility] [phone]  [facility] [phone]  [place] [phone]    Work on support network - contacts - friends, sponsor, meetings, etc.  Follow up 1 week.

## 2021-04-21 NOTE — NURSING NOTE
"Chief Complaint   Patient presents with     Medication Follow-up       Initial /86 (BP Location: Left arm, Patient Position: Sitting, Cuff Size: Adult Regular)   Pulse 88   Temp 98.2  F (36.8  C) (Tympanic)   Ht 1.6 m (5' 3\")   Wt 61.2 kg (135 lb)   SpO2 99%   BMI 23.91 kg/m   Estimated body mass index is 23.91 kg/m  as calculated from the following:    Height as of this encounter: 1.6 m (5' 3\").    Weight as of this encounter: 61.2 kg (135 lb).  Medication Reconciliation: complete  Mojgan Hutson LPN  "

## 2021-04-23 ENCOUNTER — PATIENT OUTREACH (OUTPATIENT)
Dept: CARE COORDINATION | Facility: OTHER | Age: 41
End: 2021-04-23

## 2021-04-23 NOTE — PROGRESS NOTES
"Clinic Care Coordination Contact  Care Team Conversations    CC reached out to pt this date to see how she is doing.  She reports that she is doing quite well.  She has been getting \"lots done around the house and decided to get back in touch with a couple of my cousins\".  Has been talking and spending time with them.  Energy level is coming back, along with her happiness.  States that she \"forgot this feeling after numbing myself for so long\".  Also states that the dose is works perfect (increased to 8/2mg this week).  Encouraged her to reach out if she has any questions/concerns.  Stated understanding.    Vera Robert, RN-BSN, [facility] Care Coordinator  [phone] opt. #3            "

## 2021-04-27 ENCOUNTER — PATIENT OUTREACH (OUTPATIENT)
Dept: CARE COORDINATION | Facility: OTHER | Age: 41
End: 2021-04-27

## 2021-04-27 NOTE — PROGRESS NOTES
Clinic Care Coordination Contact  Care Team Conversations    CC sent Brissa a text this date reminding her of her appointment tomorrow and to arrive at 1:45PM.    Vera Robert RN-BSN, [facility] Care Coordinator  [phone] opt. #3

## 2021-04-27 NOTE — PROGRESS NOTES
Follow-up Buprenorphine Visit           HPI       Brissa Santana is here for f/u on buprenophine/naloxone (Suboxone) therapy for opioid use disorder.  Recheck Medication      Brissa is currently taking 8/2 mg of Buprenorphine/Naloxone 1 time daily.     Status since last visit:    Since last visit patient has been: doing well -  Did have a slip yesterday with meth - found it at her house and just did it   Intensity:     There has been: moderate craving for heroin - midday and at night she lays down.      Suboxone Dose: too little - thinks it could be better  Progression of Symptoms:     Cues to use and relapse Admits to meth use     Recovery program has been: sporadic.   Accompanying Signs & Symptoms:     Side Effects: none.    Sobriety:     Status: no use since last visit.  No use of heroin - used meth once     Drug Screen: obtained.    Precipitating factors:    Triggers have been: mild. Found meth - did not go out seeking it.  Dealer did reach out to patient.  Alleviating factors:    Contact with sponsor has been: no sponsor.     Family and support system has been: helpful.   Other Therapies Tried :     Patient has been going to recovery meetings:not at all.      Other medical concerns: No    Any ED visits? No     Has not looked into outpatient treatment yet - lot going on.  Does promise to look into this next week.      Her children's grandpa passed away - father of children staying with her.   Had cancer.  Good relationship overall.  He is not sober.  He is on meth.    Has not talked with neighbor.     History   Smoking Status     Former Smoker     Packs/day: 1.00     Years: 15.00     Types: Cigarettes     Quit date: 3/11/2017   Smokeless Tobacco     Never Used     Comment: Tried to quit (yes); longest period tobacco-free- 9 months       Problem, Medication and Allergy Lists were reviewed and updated if needed.  reviewed and are current..    Pharmacy Database reviewed? Yes, 4.28.21, as expected.    Patient is  "an established patient of this clinic..         Review of Systems:   Review of Systems  CONSTITUTIONAL: NEGATIVE for fever, chills, change in weight  INTEGUMENTARY/SKIN: NEGATIVE for worrisome rashes, moles or lesions  EYES: NEGATIVE for vision changes or irritation  ENT/MOUTH: NEGATIVE for ear, mouth and throat problems  RESP: NEGATIVE for significant cough or SOB  CV: NEGATIVE for chest pain, palpitations or peripheral edema  GI: NEGATIVE for nausea, abdominal pain, heartburn, or change in bowel habits  : NEGATIVE for frequency, dysuria, or hematuria  MUSCULOSKELETAL: NEGATIVE for significant arthralgias or myalgia  NEURO: NEGATIVE for weakness, dizziness or paresthesias  ENDOCRINE: NEGATIVE for temperature intolerance, skin/hair changes  PSYCHIATRIC: NEGATIVE for changes in mood or affect          Physical Exam:     Vitals:    04/28/21 1305   BP: 126/74   Pulse: 101   Resp: 19   Temp: 99.1  F (37.3  C)   TempSrc: Tympanic   SpO2: 99%   Weight: 58.1 kg (128 lb)   Height: 1.6 m (5' 3\")     Body mass index is 22.67 kg/m .  Vitals were reviewed   Physical Exam  GENERAL APPEARANCE: alert, comfortable appearing  EYES: Eyes grossly normal to inspection  HENT:  nose no rhinorrhea  RESP: lungs clear to auscultation - no rales, rhonchi or wheezes and no resp distress  CV: regular rates and rhythm, normal S1 S2,no murmur   ABDOMEN:soft, non-tender, non-distended, no hepatosplenomegaly or masses  SKIN: no rashes, no jaundice, no obvious masses.   NEURO:  no tremor  MENTAL STATUS EXAM:  Appearance/Behavior:No apparent distress  Speech: Normal  Mood/Affect: normal affect  Insight: Fair      Results:    Results from the last 24 hours  Results for orders placed or performed in visit on 04/28/21 (from the past 24 hour(s))   Urine Drugs of Abuse Screen (Tox13)   Result Value Ref Range    Cannabinoids (24-quz-0-carboxy-9-THC) Not Detected NDET^Not Detected ng/mL    Phencyclidine (Phencyclidine) Not Detected NDET^Not Detected " ng/mL    Cocaine (Benzoylecgonine) Not Detected NDET^Not Detected ng/mL    Methamphetamine (d-Methamphetamine) Detected, Abnormal Result (A) NDET^Not Detected ng/mL    Opiates (Morphine) Not Detected NDET^Not Detected ng/mL    Amphetamine (d-Amphetamine) Detected, Abnormal Result (A) NDET^Not Detected ng/mL    Benzodiazepines (Nordiazepam) Not Detected NDET^Not Detected ng/mL    Tricyclic Antidepressants (Desipramine) Not Detected NDET^Not Detected ng/mL    Methadone (Methadone) Not Detected NDET^Not Detected ng/mL    Barbiturates (Butalbital) Not Detected NDET^Not Detected ng/mL    Oxycodone (Oxycodone) Not Detected NDET^Not Detected ng/mL    Propoxyphene (Norpropoxyphene) Not Detected NDET^Not Detected ng/mL    Buprenorphine (Buprenorphine) Detected, Abnormal Result (A) NDET^Not Detected ng/mL       Assessment and Plan     Was naloxone nasal spray prescribed? Yes.  1. Opioid use disorder (H)  No use in past week.  Daily cravings.   Increase dose of Suboxone to total of 12 mg daily.  Will re-evaluate dose reduction at follow up visits.   Counseling performed - need to put self and children first.  Ex is not sober - ongoing meth use.  He needs to be back at his own place.    Again encouraged patient to follow through with treatment options.  Will continue to check on this at each visit.  Close follow up.  - Urine Drugs of Abuse Screen (Tox13)  - SUBOXONE 8-2 MG per film; Place 1 Film under the tongue daily  Dispense: 7 each; Refill: 0  - SUBOXONE 4-1 MG per film; Place 1 Film under the tongue daily  Dispense: 7 each; Refill: 0    2. Methamphetamine use disorder, moderate (H)  As above.  Reports use x 1.  Had it at her house - did not actively seek out.  Ex uses meth - and he is staying with her.  Discussed need to have him leave.      Dosage will need adjustment today.  Increase to  8 and 4 mg  2 time(s) a day of  buprenorphine/naloxone (Suboxone).     Follow up Plan  Stage 1(1 week): Please make a clinic  appointment for follow up with me (TABITHA STATON) or another Suboxone provider in 1 week for suboxone follow up.    Greater than 10 minutes was spent with this patient, over half of which was on counseling and coordination of care which includes importance of recovery activities,which may include  attendance at NA/AA meetings, sober support network, and the importance of not isolating, being open, honest, and willing to change, possible triggers and how to avoid them, and managing cravings.    There are no discontinued medications.    Options for treatment and follow-up care were reviewed with the patient. Brissa engaged in the decision making process and verbalized understanding of the options discussed and agreed with the final plan.    I saw and examined this patient.  I have read through the note and made appropriate changes and agree with the RN Care Coordinator's assessment and plan.     Tabitha Staton MD

## 2021-04-28 ENCOUNTER — PATIENT OUTREACH (OUTPATIENT)
Dept: CARE COORDINATION | Facility: OTHER | Age: 41
End: 2021-04-28

## 2021-04-28 ENCOUNTER — OFFICE VISIT (OUTPATIENT)
Dept: FAMILY MEDICINE | Facility: OTHER | Age: 41
End: 2021-04-28
Attending: FAMILY MEDICINE
Payer: COMMERCIAL

## 2021-04-28 ENCOUNTER — APPOINTMENT (OUTPATIENT)
Dept: LAB | Facility: OTHER | Age: 41
End: 2021-04-28
Attending: FAMILY MEDICINE
Payer: COMMERCIAL

## 2021-04-28 VITALS
BODY MASS INDEX: 22.68 KG/M2 | WEIGHT: 128 LBS | RESPIRATION RATE: 19 BRPM | OXYGEN SATURATION: 99 % | HEART RATE: 101 BPM | TEMPERATURE: 99.1 F | HEIGHT: 63 IN | DIASTOLIC BLOOD PRESSURE: 74 MMHG | SYSTOLIC BLOOD PRESSURE: 126 MMHG

## 2021-04-28 DIAGNOSIS — F15.20 METHAMPHETAMINE USE DISORDER, MODERATE (H): ICD-10-CM

## 2021-04-28 DIAGNOSIS — F11.90 OPIOID USE DISORDER: Primary | ICD-10-CM

## 2021-04-28 PROCEDURE — G0463 HOSPITAL OUTPT CLINIC VISIT: HCPCS | Mod: 25

## 2021-04-28 PROCEDURE — 80306 DRUG TEST PRSMV INSTRMNT: CPT | Mod: ZL | Performed by: FAMILY MEDICINE

## 2021-04-28 PROCEDURE — 99214 OFFICE O/P EST MOD 30 MIN: CPT | Performed by: FAMILY MEDICINE

## 2021-04-28 PROCEDURE — G0463 HOSPITAL OUTPT CLINIC VISIT: HCPCS

## 2021-04-28 RX ORDER — BUPRENORPHINE HYDROCHLORIDE, NALOXONE HYDROCHLORIDE 4; 1 MG/1; MG/1
1 FILM, SOLUBLE BUCCAL; SUBLINGUAL DAILY
Qty: 7 EACH | Refills: 0 | Status: SHIPPED | OUTPATIENT
Start: 2021-04-28

## 2021-04-28 RX ORDER — BUPRENORPHINE HYDROCHLORIDE, NALOXONE HYDROCHLORIDE 8; 2 MG/1; MG/1
1 FILM, SOLUBLE BUCCAL; SUBLINGUAL DAILY
Qty: 7 EACH | Refills: 0 | Status: SHIPPED | OUTPATIENT
Start: 2021-04-28

## 2021-04-28 ASSESSMENT — MIFFLIN-ST. JEOR: SCORE: 1214.73

## 2021-04-28 ASSESSMENT — PAIN SCALES - GENERAL: PAINLEVEL: NO PAIN (0)

## 2021-04-28 NOTE — NURSING NOTE
"Chief Complaint   Patient presents with     Recheck Medication       Initial /74   Pulse 101   Temp 99.1  F (37.3  C) (Tympanic)   Resp 19   Ht 1.6 m (5' 3\")   Wt 58.1 kg (128 lb)   SpO2 99%   BMI 22.67 kg/m   Estimated body mass index is 22.67 kg/m  as calculated from the following:    Height as of this encounter: 1.6 m (5' 3\").    Weight as of this encounter: 58.1 kg (128 lb).  Medication Reconciliation: complete  Nia Echavarria LPN    "

## 2021-05-04 ENCOUNTER — PATIENT OUTREACH (OUTPATIENT)
Dept: CARE COORDINATION | Facility: OTHER | Age: 41
End: 2021-05-04

## 2021-05-04 NOTE — TELEPHONE ENCOUNTER
Clinic Care Coordination Contact  Care Team Conversations    CC sent Brissa a text message this date reminding her of her appt tomorrow and to arrive at 1:15PM.    Vera Robert RN-BSN, [facility] Care Coordinator  [phone] opt. #3

## 2021-05-06 ENCOUNTER — TELEPHONE (OUTPATIENT)
Dept: FAMILY MEDICINE | Facility: OTHER | Age: 41
End: 2021-05-06

## 2021-05-06 NOTE — TELEPHONE ENCOUNTER
Pt notified to arrive at 1:15PM tomorrow.    Vera Robert RN-BSN, [facility] Care Coordinator  [phone] opt. #3

## 2021-05-06 NOTE — TELEPHONE ENCOUNTER
12:42 PM    Reason for Call: OVERBOOK    Patient had to cancel her appt for 05/06/2021 at 1:00pm and states that she was told that she could be placed on the schedule for 05/07/2021 even if there were no openings. Please call patient to schedule appt.     The patient is requesting an appointment for 05/07/2021 @01:30pm  with Tabitha Stack.     Was an appointment offered for this call? No  If yes : Appointment type              Date    Preferred method for responding to this message: Telephone Call  What is your phone number ? [phone]    If we cannot reach you directly, may we leave a detailed response at the number you provided? Yes    Can this message wait until your PCP/provider returns, if unavailable today? Not applicable, Provider is in today    Mary Bojorquez

## 2021-06-24 ENCOUNTER — PATIENT OUTREACH (OUTPATIENT)
Dept: CARE COORDINATION | Facility: OTHER | Age: 41
End: 2021-06-24

## 2021-06-24 NOTE — PROGRESS NOTES
Have not heard back from her yet.  Took hold off that time slot.  Will wait to hear back from her to see if there is a day next week that would work for her.    Vera Robert RN-BSN, [facility] Care Coordinator  [phone] opt. #3

## 2021-06-24 NOTE — PROGRESS NOTES
Clinic Care Coordination Contact  Care Team Conversations    Received VM from Brissa yesterday asking if she could be seen today.  Please advise, as your schedule it full.    Vera Robert RN-BSN, [facility] Care Coordinator  [phone] opt. #3

## 2021-06-24 NOTE — PROGRESS NOTES
Message sent asking if she can come at that time.    Vera Robert, RN-BSN, [facility] Care Coordinator  [phone] opt. #3

## 2021-06-24 NOTE — TELEPHONE ENCOUNTER
Appt made for June 30, arrive at 11:00AM.  Brissa happy and agreeable with this.    Vera Robert, RN-BSN, [facility] Care Coordinator  [phone] opt. #3

## 2021-07-01 ENCOUNTER — PATIENT OUTREACH (OUTPATIENT)
Dept: CARE COORDINATION | Facility: OTHER | Age: 41
End: 2021-07-01

## 2021-07-01 NOTE — PROGRESS NOTES
Clinic Care Coordination Contact  Care Team Conversations    Received text from Brissa after hours last night.  Stated that she feels very ashamed of herself.  Couldn't get herself out the door yesterday.  She is requesting CC get a hold of her today to get her an appt today.  Does express remorse for not calling to cancel.  Feels embarrassed.    CC did send her a text this AM informing her that we have no openings today.  Also asked her if it would be easier for her to see Angela Reynolds in [place], since transportation has always been a barrier for her.    Waiting to hear back.    Vera Robert, RN-BSN, [facility] Care Coordinator  [phone] opt. #3

## 2021-08-11 ENCOUNTER — HOSPITAL ENCOUNTER (EMERGENCY)
Facility: HOSPITAL | Age: 41
Discharge: HOME | End: 2021-08-12
Attending: INTERNAL MEDICINE | Admitting: INTERNAL MEDICINE
Payer: COMMERCIAL

## 2021-08-11 VITALS
SYSTOLIC BLOOD PRESSURE: 163 MMHG | HEART RATE: 104 BPM | DIASTOLIC BLOOD PRESSURE: 103 MMHG | OXYGEN SATURATION: 96 % | TEMPERATURE: 98.3 F | RESPIRATION RATE: 18 BRPM

## 2021-08-11 DIAGNOSIS — L03.211 FACIAL CELLULITIS: ICD-10-CM

## 2021-08-11 PROCEDURE — 99282 EMERGENCY DEPT VISIT SF MDM: CPT | Mod: 25

## 2021-08-11 PROCEDURE — 10060 I&D ABSCESS SIMPLE/SINGLE: CPT

## 2021-08-12 PROCEDURE — 250N000013 HC RX MED GY IP 250 OP 250 PS 637: Performed by: INTERNAL MEDICINE

## 2021-08-12 RX ORDER — OXYCODONE HYDROCHLORIDE 5 MG/1
10 TABLET ORAL ONCE
Status: COMPLETED | OUTPATIENT
Start: 2021-08-12 | End: 2021-08-12

## 2021-08-12 RX ORDER — DOXYCYCLINE 100 MG/1
100 CAPSULE ORAL 2 TIMES DAILY
Qty: 14 CAPSULE | Refills: 0 | Status: SHIPPED | OUTPATIENT
Start: 2021-08-12 | End: 2021-08-19

## 2021-08-12 RX ORDER — DOXYCYCLINE 100 MG/1
100 CAPSULE ORAL ONCE
Status: COMPLETED | OUTPATIENT
Start: 2021-08-12 | End: 2021-08-12

## 2021-08-12 RX ADMIN — DOXYCYCLINE HYCLATE 100 MG: 100 CAPSULE ORAL at 00:23

## 2021-08-12 RX ADMIN — OXYCODONE HYDROCHLORIDE 10 MG: 5 TABLET ORAL at 00:23

## 2021-08-12 NOTE — ED NOTES
"Patient given written and verbal discharge instructions and patient verbalizes understanding. Patient would like to wait in room while calling for a ride and to let \"pain medicine start working\". Patient given ice pack for comfort.   "

## 2021-08-12 NOTE — ED NOTES
"Patient ambulatory to ED room 2. Patient states that  2 days ago she \"popped a zit\" and then area on forehead has been red, swollen and painful since. Patient states this happened recently to an area under her arm, but that was able to \"pop\" and \"lot's of stuff came out\". Patient is a/o x4 and ABCs intact. Patient notes that she did drive self here.   "

## 2021-08-18 PROCEDURE — 10060 I&D ABSCESS SIMPLE/SINGLE: CPT | Performed by: INTERNAL MEDICINE

## 2021-08-18 ASSESSMENT — ENCOUNTER SYMPTOMS
COLOR CHANGE: 0
EYE REDNESS: 0
ARTHRALGIAS: 0
ABDOMINAL PAIN: 0
DIFFICULTY URINATING: 0
FEVER: 0
HEADACHES: 0
CONFUSION: 0
SHORTNESS OF BREATH: 0
NECK STIFFNESS: 0

## 2021-08-18 NOTE — ED PROVIDER NOTES
"  History     Chief Complaint   Patient presents with     Cellulitis     forehead, attempted to \"pop a zit\" 2 days ago, pain and swelling     The history is provided by the patient.   Abscess  Location:  Face  Facial abscess location:  Face and forehead  Abscess quality: induration, painful, redness and warmth    Progression:  Worsening  Pain details:     Quality:  Dull    Severity:  Moderate    Duration:  2 days    Timing:  Constant    Progression:  Worsening  Chronicity:  New  Associated symptoms: no fever and no headaches          Allergies:  Allergies   Allergen Reactions     Latex      Irritation; see comments       Problem List:    Patient Active Problem List    Diagnosis Date Noted     Methamphetamine use disorder, moderate (H) 04/28/2021     Priority: Medium     Opioid use disorder (H) 04/28/2021     Priority: Medium     Vitamin D deficiency 11/06/2019     Priority: Medium     ACP (advance care planning) 07/14/2016     Priority: Medium     Advance Care Planning 7/14/2016: ACP Review of Chart / Resources Provided:  Reviewed chart for advance care plan.  Brissa Santana has no plan or code status on file. Discussed available resources and provided with information.   Added by Corbin Valencia             Uncomplicated opioid dependence (H) 07/14/2016     Priority: Medium     MDD (major depressive disorder), recurrent, severe, with psychosis (H)      Priority: Medium     FRANCIA (generalized anxiety disorder)      Priority: Medium     Drug overdose 02/19/2016     Priority: Medium     Inflammation of sacroiliac joint (H) 10/16/2015     Priority: Medium     Insomnia 11/21/2013     Priority: Medium     Neck pain 05/20/2013     Priority: Medium     History of reduction mammoplasty 05/20/2013     Priority: Medium     History of spinal surgery 05/20/2013     Priority: Medium     Tramadol dependency and abuse 03/16/2011     Priority: Medium        Past Medical History:    Past Medical History:   Diagnosis Date     Backache, " unspecified 2007     Chemical dependency (H) 2016     FRANCIA (generalized anxiety disorder)      Heroin use 2013     Lumbago 2003     MDD (major depressive disorder), recurrent, severe, with psychosis (H)      Polypharmacy -h/o tramadol,ambien,narcotic depend. 2013     Tramadol dependency and abuse 2011       Past Surgical History:    Past Surgical History:   Procedure Laterality Date     APPENDECTOMY       BACK SURGERY      lumbar diskectomy with fusion      SECTION      x2     COLONOSCOPY N/A 2018    Procedure: COLONOSCOPY;  DIAGNOSTIC COLONOSCOPY TOTAL HEMORRHOIDECTOMY;  Surgeon: Sharif Yeboah MD;  Location: [facility] OR     D & C       HEMORRHOIDECTOMY INTERNAL N/A 2018    Procedure: HEMORRHOIDECTOMY INTERNAL;;  Surgeon: Sharif Yeboah MD;  Location: [facility] OR     OTHER SURGICAL HISTORY      Breast Reduction     TONSILLECTOMY       TUBAL LIGATION         Family History:    Family History   Problem Relation Age of Onset     Breast Cancer Maternal Grandmother      Cancer Paternal Grandmother      Hepatitis Paternal Uncle         Hep C     Hepatitis Father         Hep C       Social History:  Marital Status:  Single [1]  Social History     Tobacco Use     Smoking status: Former Smoker     Packs/day: 1.00     Years: 15.00     Pack years: 15.00     Types: Cigarettes     Quit date: 3/11/2017     Years since quittin.4     Smokeless tobacco: Never Used     Tobacco comment: Tried to quit (yes); longest period tobacco-free- 9 months   Substance Use Topics     Alcohol use: No     Drug use: Yes     Types: Opiates, Amphetamines        Medications:    doxycycline hyclate (VIBRAMYCIN) 100 MG capsule  naloxone (NARCAN) 4 MG/0.1ML nasal spray  SUBOXONE 4-1 MG per film  SUBOXONE 8-2 MG per film          Review of Systems   Constitutional: Negative for fever.   HENT: Negative for congestion.    Eyes: Negative for redness.   Respiratory: Negative for shortness of breath.     Cardiovascular: Negative for chest pain.   Gastrointestinal: Negative for abdominal pain.   Genitourinary: Negative for difficulty urinating.   Musculoskeletal: Negative for arthralgias and neck stiffness.   Skin: Negative for color change.   Neurological: Negative for headaches.   Psychiatric/Behavioral: Negative for confusion.   All other systems reviewed and are negative.      Physical Exam   BP: (!) 163/103  Pulse: 104  Temp: 98.3  F (36.8  C)  Resp: 18  SpO2: 96 %      Physical Exam  Constitutional:       General: She is not in acute distress.     Appearance: She is not diaphoretic.   HENT:      Head: Atraumatic.        Comments: Redness, swelling, very tender on lower mid forehead between eyebrows.      Mouth/Throat:      Pharynx: No oropharyngeal exudate.   Eyes:      General: No scleral icterus.     Pupils: Pupils are equal, round, and reactive to light.   Cardiovascular:      Heart sounds: Normal heart sounds.   Pulmonary:      Effort: No respiratory distress.      Breath sounds: Normal breath sounds.   Abdominal:      General: Bowel sounds are normal.      Palpations: Abdomen is soft.      Tenderness: There is no abdominal tenderness.   Musculoskeletal:         General: No tenderness.   Skin:     General: Skin is warm.      Findings: No rash.         ED Course        [facility]    PROCEDURE: -Incision/Drainage    Date/Time: 8/18/2021 8:19 AM  Performed by: Wayne Gonzales MD  Authorized by: Wayne Gonzales MD       LOCATION:      Type:  Abscess    Size:  4    Location:  Head    Head location:  Face    PRE-PROCEDURE DETAILS:     Skin preparation:  Betadine    ANESTHESIA (see MAR for exact dosages):     Anesthesia method:  Local infiltration    Local anesthetic:  Lidocaine 2% WITH epi    PROCEDURE TYPE:     Complexity:  Simple    PROCEDURE DETAILS:     Incision types:  Stab incision    Scalpel blade:  10    Drainage:  Bloody    Wound treatment:  Wound left open  Post-procedure details:     PROCEDURE    Patient Tolerance:  Patient tolerated the procedure well with no immediate complications                      No results found for this or any previous visit (from the past 24 hour(s)).    Medications   oxyCODONE (ROXICODONE) tablet 10 mg (10 mg Oral Given 8/12/21 0023)   doxycycline hyclate (VIBRAMYCIN) capsule 100 mg (100 mg Oral Given 8/12/21 0023)       Assessments & Plan (with Medical Decision Making)   Abscess and cellulitis of face , I&D was done with only bloody discharge  Doxycycline started  I advised return to ER if pain increasing developed fever, headache , swelling spreading to eye or any other unusual symptoms  She understood and agreed.  D C home.   I have reviewed the nursing notes.    I have reviewed the findings, diagnosis, plan and need for follow up with the patient.      Discharge Medication List as of 8/12/2021 12:17 AM      START taking these medications    Details   doxycycline hyclate (VIBRAMYCIN) 100 MG capsule Take 1 capsule (100 mg) by mouth 2 times daily for 7 days, Disp-14 capsule, R-0, E-Prescribe             Final diagnoses:   Facial cellulitis       8/11/2021   [facility]     Wayne Gonzales MD  08/18/21 1220

## 2021-09-17 ENCOUNTER — HOSPITAL ENCOUNTER (EMERGENCY)
Facility: HOSPITAL | Age: 41
Discharge: HOME | End: 2021-09-17
Attending: PHYSICIAN ASSISTANT | Admitting: PHYSICIAN ASSISTANT
Payer: COMMERCIAL

## 2021-09-17 VITALS
RESPIRATION RATE: 18 BRPM | DIASTOLIC BLOOD PRESSURE: 111 MMHG | OXYGEN SATURATION: 98 % | TEMPERATURE: 98.6 F | HEART RATE: 109 BPM | SYSTOLIC BLOOD PRESSURE: 143 MMHG

## 2021-09-17 DIAGNOSIS — L02.419 AXILLARY ABSCESS: ICD-10-CM

## 2021-09-17 PROCEDURE — 99283 EMERGENCY DEPT VISIT LOW MDM: CPT | Performed by: PHYSICIAN ASSISTANT

## 2021-09-17 PROCEDURE — 99283 EMERGENCY DEPT VISIT LOW MDM: CPT

## 2021-09-17 RX ORDER — SULFAMETHOXAZOLE/TRIMETHOPRIM 800-160 MG
1 TABLET ORAL 2 TIMES DAILY
Qty: 14 TABLET | Refills: 0 | Status: SHIPPED | OUTPATIENT
Start: 2021-09-17 | End: 2021-09-24

## 2021-09-17 RX ORDER — MUPIROCIN 20 MG/G
OINTMENT TOPICAL 3 TIMES DAILY
Qty: 30 G | Refills: 0 | Status: SHIPPED | OUTPATIENT
Start: 2021-09-17 | End: 2021-09-24

## 2021-09-17 ASSESSMENT — ENCOUNTER SYMPTOMS
ROS SKIN COMMENTS: SEE HPI
FEVER: 0
CHILLS: 0

## 2021-09-18 NOTE — ED PROVIDER NOTES
History     Chief Complaint   Patient presents with     Cyst     under left armpit     The history is provided by the patient.     Brissa Santana is a 41 year old female who presented to the emergency department ambulatory for evaluation of a lump on her left armpit.  No fevers or chills.  Present for the last approximate 1 week.    Allergies:  Allergies   Allergen Reactions     Latex      Irritation; see comments       Problem List:    Patient Active Problem List    Diagnosis Date Noted     Methamphetamine use disorder, moderate (H) 04/28/2021     Priority: Medium     Opioid use disorder (H) 04/28/2021     Priority: Medium     Vitamin D deficiency 11/06/2019     Priority: Medium     ACP (advance care planning) 07/14/2016     Priority: Medium     Advance Care Planning 7/14/2016: ACP Review of Chart / Resources Provided:  Reviewed chart for advance care plan.  Brissa Santana has no plan or code status on file. Discussed available resources and provided with information.   Added by Corbin Valencia             Uncomplicated opioid dependence (H) 07/14/2016     Priority: Medium     MDD (major depressive disorder), recurrent, severe, with psychosis (H)      Priority: Medium     FRANCIA (generalized anxiety disorder)      Priority: Medium     Drug overdose 02/19/2016     Priority: Medium     Inflammation of sacroiliac joint (H) 10/16/2015     Priority: Medium     Insomnia 11/21/2013     Priority: Medium     Neck pain 05/20/2013     Priority: Medium     History of reduction mammoplasty 05/20/2013     Priority: Medium     History of spinal surgery 05/20/2013     Priority: Medium     Tramadol dependency and abuse 03/16/2011     Priority: Medium        Past Medical History:    Past Medical History:   Diagnosis Date     Backache, unspecified 01/22/2007     Chemical dependency (H) 7/14/2016     FRANCIA (generalized anxiety disorder)      Heroin use 01/25/2013     Lumbago 03/17/2003     MDD (major depressive disorder), recurrent,  severe, with psychosis (H)      Polypharmacy -h/o tramadol,ambien,narcotic depend. 2013     Tramadol dependency and abuse 2011       Past Surgical History:    Past Surgical History:   Procedure Laterality Date     APPENDECTOMY       BACK SURGERY      lumbar diskectomy with fusion      SECTION      x2     COLONOSCOPY N/A 2018    Procedure: COLONOSCOPY;  DIAGNOSTIC COLONOSCOPY TOTAL HEMORRHOIDECTOMY;  Surgeon: Sharif Yeboah MD;  Location: [facility]     D & C       HEMORRHOIDECTOMY INTERNAL N/A 2018    Procedure: HEMORRHOIDECTOMY INTERNAL;;  Surgeon: Sharif Yeboah MD;  Location: [facility]     OTHER SURGICAL HISTORY      Breast Reduction     TONSILLECTOMY       TUBAL LIGATION         Family History:    Family History   Problem Relation Age of Onset     Breast Cancer Maternal Grandmother      Cancer Paternal Grandmother      Hepatitis Paternal Uncle         Hep C     Hepatitis Father         Hep C       Social History:  Marital Status:  Single [1]  Social History     Tobacco Use     Smoking status: Former Smoker     Packs/day: 1.00     Years: 15.00     Pack years: 15.00     Types: Cigarettes     Quit date: 3/11/2017     Years since quittin.5     Smokeless tobacco: Never Used     Tobacco comment: Tried to quit (yes); longest period tobacco-free- 9 months   Substance Use Topics     Alcohol use: No     Drug use: Yes     Types: Opiates, Amphetamines        Medications:    mupirocin (BACTROBAN) 2 % external ointment  sulfamethoxazole-trimethoprim (BACTRIM DS) 800-160 MG tablet  naloxone (NARCAN) 4 MG/0.1ML nasal spray  SUBOXONE 4-1 MG per film  SUBOXONE 8-2 MG per film          Review of Systems   Constitutional: Negative for chills and fever.   Musculoskeletal:        Left armpit lump.   Skin:        See HPI       Physical Exam   BP: (!) 152/102  Pulse: 108  Temp: 97.8  F (36.6  C)  Resp: 20  SpO2: 98 %      Physical Exam  Vitals and nursing note reviewed.   Constitutional:        Appearance: Normal appearance. She is normal weight.   Musculoskeletal:      Comments: Small approximate 1/2 cm diameter area of induration with no palpable central fluctuance in the left axilla.  No significant surrounding cellulitis.   Skin:     General: Skin is warm and dry.      Capillary Refill: Capillary refill takes less than 2 seconds.   Neurological:      General: No focal deficit present.      Mental Status: She is alert and oriented to person, place, and time.         ED Course        Procedures              Critical Care time:  none               No results found for this or any previous visit (from the past 24 hour(s)).    Medications - No data to display    Assessments & Plan (with Medical Decision Making)   Small left axillary induration/abscess that is not amenable to I&D at this time.  No high risk features.  Bactrim for home as well as Bactroban for her nostrils.  Follow-up in the clinic.  Return here for any new symptoms, worsening symptoms, or other concerns.    This document was prepared using a combination of typing and voice generated software.  While every attempt was made for accuracy, spelling and grammatical errors may exist.    I have reviewed the nursing notes.    I have reviewed the findings, diagnosis, plan and need for follow up with the patient.       New Prescriptions    MUPIROCIN (BACTROBAN) 2 % EXTERNAL OINTMENT    Spray into both nostrils 3 times daily for 7 days    SULFAMETHOXAZOLE-TRIMETHOPRIM (BACTRIM DS) 800-160 MG TABLET    Take 1 tablet by mouth 2 times daily for 7 days       Final diagnoses:   Axillary abscess       9/17/2021   [facility]     Gustavo Alvares PA-C  09/17/21 5927

## 2021-09-18 NOTE — DISCHARGE INSTRUCTIONS
Bactrim and Bactroban as prescribed.     Follow-up in the clinic next week for recheck.     Please return here for any other concerns.

## 2021-11-14 ENCOUNTER — PATIENT OUTREACH (OUTPATIENT)
Dept: OTHER | Facility: HOSPITAL | Age: 41
End: 2021-11-14
Payer: COMMERCIAL

## 2021-11-15 NOTE — TELEPHONE ENCOUNTER
Patient Quality Outreach    Patient is due for the following:   Depression  -  PHQ-9 Needed    NEXT STEPS:   Awaiting patient responses to [facility] PHQ/FRANCIA sent today.     Type of outreach:    Sent [employer] message.      Questions for provider review:    None     Shu Peng RN

## 2022-01-25 ENCOUNTER — TELEPHONE (OUTPATIENT)
Dept: OBGYN | Facility: OTHER | Age: 42
End: 2022-01-25
Payer: COMMERCIAL

## 2022-01-25 NOTE — TELEPHONE ENCOUNTER
Pt calling with vaginal burning itching and white discharge. Has not seen you since 7/25/19. States she has an infection and would like something ordered for it. She uses [facility] in [place]. Please advise

## 2022-02-25 ENCOUNTER — NURSE TRIAGE (OUTPATIENT)
Dept: FAMILY MEDICINE | Facility: OTHER | Age: 42
End: 2022-02-25
Payer: COMMERCIAL

## 2022-02-25 DIAGNOSIS — K04.7 TOOTH INFECTION: Primary | ICD-10-CM

## 2022-02-25 NOTE — TELEPHONE ENCOUNTER
Yes I agree, but she is asking for an antibiotic for the infected tooth that she has.  She is in quarantine and does not want to go to the clinic and she can't go to the dentist.  Should I tell her UC?

## 2022-02-25 NOTE — TELEPHONE ENCOUNTER
"Patient is Covid positive on 2/23/22 and has to quarantine.  Can't go to dentist or to the clinic.  Patient has loss of taste and smell and some breathing issues from the covid and as the days go on she get's more symptoms.  She is requesting an antibiotic be sent to [facility] if it is ok to send one. Please let her know either way.  [phone]    Reason for Disposition    Face is swollen    Answer Assessment - Initial Assessment Questions  1. LOCATION: \"Which tooth is hurting?\"  (e.g., right-side/left-side, upper/lower, front/back)      Lower back left  2. ONSET: \"When did the toothache start?\"  (e.g., hours, days)       4 days ago  3. SEVERITY: \"How bad is the toothache?\"  (Scale 1-10; mild, moderate or severe)    - MILD (1-3): doesn't interfere with chewing     - MODERATE (4-7): interferes with chewing, interferes with normal activities, awakens from sleep      - SEVERE (8-10): unable to eat, unable to do any normal activities, excruciating pain         6/10 with ibuprofen  4. SWELLING: \"Is there any visible swelling of your face?\"      Yes face is swollen on the left side  5. OTHER SYMPTOMS: \"Do you have any other symptoms?\" (e.g., fever)      no  6. PREGNANCY: \"Is there any chance you are pregnant?\" \"When was your last menstrual period?\"      no    Protocols used: TOOTHACHE-A-OH      "

## 2022-02-25 NOTE — TELEPHONE ENCOUNTER
Antibiotics are not indicated for COVID. If symptoms are getting worse needs to be seen in clinic or UC.    Edita Gilmore MD

## 2022-03-24 NOTE — PROGRESS NOTES
Clinic Care Coordination Contact  Care Team Conversations    CC attended office visit with pt and Dr. Stack this date. Please refer to Dr. Stack's note for plan of care.  All questions answered.  Encouraged her to call/text CC with any questions/concerns/problems.  Verbalized understanding.    Vera Robert RN-BSN, [facility] Care Coordinator  [phone] opt. #3             Have You Had Botox Before?: has not had botox Additional History: Patient is interested in Botox or fillers, she is mainly concerned glabella and marionettes

## 2022-04-15 NOTE — TELEPHONE ENCOUNTER
ibuprofen (ADVIL/MOTRIN) 800 MG tablet  Last Written Prescription Date:  5/4/18  Last Fill Quantity: 42,   # refills: 0  Last Office Visit: 3/5/18  Future Office visit:         
Not on med list.  PCP Tam[place]   
No

## 2023-09-14 NOTE — PROGRESS NOTES
Clinic Care Coordination Contact  Care Team Conversations    CC attended office visit with pt and Dr. Stack this date.  Please refer to Dr. Stack's note for plan of care.  All questions answered.  Encouraged her to call/text CC with any questions/concerns/problems.  Verbalized understanding.    Vera Robert RN-BSN, [facility] Care Coordinator  [phone] opt. #3             belongings with granddaughter at bedside./Yes

## 2023-10-31 NOTE — NURSING NOTE
"Chief Complaint   Patient presents with     RECHECK     follow up pap and pt has been having vaginal pain and vaginal discoloration       Initial /73 (BP Location: Left arm, Cuff Size: Adult Regular)  Pulse 50  Ht 5' 3\" (1.6 m)  Wt 150 lb (68 kg)  BMI 26.57 kg/m2 Estimated body mass index is 26.57 kg/(m^2) as calculated from the following:    Height as of this encounter: 5' 3\" (1.6 m).    Weight as of this encounter: 150 lb (68 kg).  Medication Reconciliation: complete    Loida Echols LPN  "
Patient

## 2023-11-07 NOTE — TELEPHONE ENCOUNTER
Clinic Care Coordination Contact  Care Team Conversations    Received phone call from Brissa manley.  Reports that she started her Suboxone over the weekend and 1 film wasn't working, so she took 2 films per day.  She is now out.  States the 2 films per day didn't work either and that she feels like her withdrawals are terrible.  Informed her that her heroin is not heroin, and it is actually Fentanyl, and that is most likely the reason why the Suboxone is not helping her at this point.  Informed her that Dr. Stack is out today, but that she has an appt tomorrow at 12:45PM.  States she will make her appt and she plans on not using.  Highly encouraged her to not use at this point, because it would most likely make things worse.  Verbalized understanding.    Vera Robert RN-BSN, [facility] Care Coordinator  [phone] opt. #3            
The patient is a 30y Female complaining of head congestion.

## 2024-02-06 NOTE — TELEPHONE ENCOUNTER
Call from patient on lab results from 4/7/21. Notified labs are all ok per Dr. Philip. Patient requesting a copy of the lab results. Transferred to medical records to request copies.   
Maday Jara

## 2025-06-27 NOTE — PATIENT INSTRUCTIONS
[facility] Accepted -- [address] [facility] [address] [phone]    [facility] - [facility] Accepted -- [address] [facility] [id number] [phone]    [facility] -- [address] [facility] [address] [phone]    [facility] - SNF Declined  No contract with patient's insurance carrier -- [address] [facility] [address] [phone]    [facility] Declined  No contract with patient's insurance carrier -- [address] [facility] [id number] [phone]                  Melissa Alanis, OT)  Transfers: Modified  Follow up 6/6/19.   

## (undated) DEVICE — NDL-25G 1-1/2" NON-SAFETY

## (undated) DEVICE — DRSG-KERLIX 6 X 6 3/4 FLUFF

## (undated) DEVICE — CANISTER-SUCTION 2000CC

## (undated) DEVICE — TRAY-SKIN PREP POVIDONE/IODINE

## (undated) DEVICE — TUBING-SUCTION 20FT

## (undated) DEVICE — PANTIES-MESH L/XL

## (undated) DEVICE — BLANKET-BAIR UPPER BODY

## (undated) DEVICE — CAUTERY-NEEDLEPOINT

## (undated) DEVICE — SUCTION TUBE-YANKAUR

## (undated) DEVICE — SUTURE-CHROMIC GUT 0 CT-1 812H

## (undated) DEVICE — BLADE-SCALPEL #15

## (undated) DEVICE — SYRINGE-10CC LUER LOCK

## (undated) DEVICE — GOWN-SURG XXL LVL 3 REINFORCED

## (undated) DEVICE — PACK-LAPAROTOMY-CUSTOM

## (undated) DEVICE — CAUTERY PAD-POLYHESIVE II ADULT

## (undated) DEVICE — IRRIGATION-H2O 1000ML

## (undated) DEVICE — LIGHT HANDLE COVER

## (undated) DEVICE — LIGASURE-SMALL JAW SEALER/DIVIDER

## (undated) DEVICE — IRRIGATION-NACL 1000ML

## (undated) DEVICE — LABEL-STERILE PREPRINTED FOR OR

## (undated) RX ORDER — LIDOCAINE HYDROCHLORIDE 20 MG/ML
INJECTION, SOLUTION EPIDURAL; INFILTRATION; INTRACAUDAL; PERINEURAL
Status: DISPENSED
Start: 2018-04-19

## (undated) RX ORDER — PROPOFOL 10 MG/ML
INJECTION, EMULSION INTRAVENOUS
Status: DISPENSED
Start: 2018-04-19

## (undated) RX ORDER — ONDANSETRON 2 MG/ML
INJECTION INTRAMUSCULAR; INTRAVENOUS
Status: DISPENSED
Start: 2018-04-19

## (undated) RX ORDER — PHENYLEPHRINE HCL IN 0.9% NACL 1 MG/10 ML
SYRINGE (ML) INTRAVENOUS
Status: DISPENSED
Start: 2018-04-19

## (undated) RX ORDER — FENTANYL CITRATE 50 UG/ML
INJECTION, SOLUTION INTRAMUSCULAR; INTRAVENOUS
Status: DISPENSED
Start: 2018-04-19

## (undated) RX ORDER — KETOROLAC TROMETHAMINE 30 MG/ML
INJECTION, SOLUTION INTRAMUSCULAR; INTRAVENOUS
Status: DISPENSED
Start: 2018-04-19

## (undated) RX ORDER — DEXAMETHASONE SODIUM PHOSPHATE 10 MG/ML
INJECTION, SOLUTION INTRAMUSCULAR; INTRAVENOUS
Status: DISPENSED
Start: 2018-04-19